# Patient Record
Sex: MALE | Race: WHITE | NOT HISPANIC OR LATINO
[De-identification: names, ages, dates, MRNs, and addresses within clinical notes are randomized per-mention and may not be internally consistent; named-entity substitution may affect disease eponyms.]

---

## 2017-08-11 ENCOUNTER — APPOINTMENT (OUTPATIENT)
Dept: SURGERY | Facility: CLINIC | Age: 57
End: 2017-08-11
Payer: MEDICARE

## 2017-08-11 VITALS
SYSTOLIC BLOOD PRESSURE: 102 MMHG | WEIGHT: 206 LBS | RESPIRATION RATE: 16 BRPM | TEMPERATURE: 97.7 F | HEART RATE: 62 BPM | BODY MASS INDEX: 30.51 KG/M2 | DIASTOLIC BLOOD PRESSURE: 69 MMHG | OXYGEN SATURATION: 99 % | HEIGHT: 69 IN

## 2017-08-11 DIAGNOSIS — Z82.49 FAMILY HISTORY OF ISCHEMIC HEART DISEASE AND OTHER DISEASES OF THE CIRCULATORY SYSTEM: ICD-10-CM

## 2017-08-11 DIAGNOSIS — Z78.9 OTHER SPECIFIED HEALTH STATUS: ICD-10-CM

## 2017-08-11 DIAGNOSIS — Z82.0 FAMILY HISTORY OF EPILEPSY AND OTHER DISEASES OF THE NERVOUS SYSTEM: ICD-10-CM

## 2017-08-11 DIAGNOSIS — Z86.39 PERSONAL HISTORY OF OTHER ENDOCRINE, NUTRITIONAL AND METABOLIC DISEASE: ICD-10-CM

## 2017-08-11 DIAGNOSIS — I25.2 OLD MYOCARDIAL INFARCTION: ICD-10-CM

## 2017-08-11 PROCEDURE — 99204 OFFICE O/P NEW MOD 45 MIN: CPT

## 2017-08-18 ENCOUNTER — APPOINTMENT (OUTPATIENT)
Dept: SURGERY | Facility: CLINIC | Age: 57
End: 2017-08-18

## 2017-12-01 ENCOUNTER — APPOINTMENT (OUTPATIENT)
Dept: ORTHOPEDIC SURGERY | Facility: CLINIC | Age: 57
End: 2017-12-01
Payer: MEDICARE

## 2017-12-01 VITALS
SYSTOLIC BLOOD PRESSURE: 123 MMHG | BODY MASS INDEX: 30.51 KG/M2 | DIASTOLIC BLOOD PRESSURE: 73 MMHG | WEIGHT: 206 LBS | HEART RATE: 69 BPM | HEIGHT: 69 IN

## 2017-12-01 PROCEDURE — 73562 X-RAY EXAM OF KNEE 3: CPT | Mod: RT

## 2017-12-01 PROCEDURE — 99204 OFFICE O/P NEW MOD 45 MIN: CPT | Mod: 25

## 2017-12-01 PROCEDURE — 20610 DRAIN/INJ JOINT/BURSA W/O US: CPT | Mod: RT

## 2018-01-29 ENCOUNTER — RECORD ABSTRACTING (OUTPATIENT)
Age: 58
End: 2018-01-29

## 2018-01-29 DIAGNOSIS — Z87.19 PERSONAL HISTORY OF OTHER DISEASES OF THE DIGESTIVE SYSTEM: ICD-10-CM

## 2018-02-05 ENCOUNTER — FORM ENCOUNTER (OUTPATIENT)
Age: 58
End: 2018-02-05

## 2018-02-06 ENCOUNTER — APPOINTMENT (OUTPATIENT)
Dept: ORTHOPEDIC SURGERY | Facility: HOSPITAL | Age: 58
End: 2018-02-06

## 2018-02-22 ENCOUNTER — MEDICATION RENEWAL (OUTPATIENT)
Age: 58
End: 2018-02-22

## 2018-02-23 ENCOUNTER — APPOINTMENT (OUTPATIENT)
Dept: ORTHOPEDIC SURGERY | Facility: CLINIC | Age: 58
End: 2018-02-23

## 2018-02-23 ENCOUNTER — APPOINTMENT (OUTPATIENT)
Dept: CARDIOLOGY | Facility: CLINIC | Age: 58
End: 2018-02-23

## 2018-03-27 ENCOUNTER — APPOINTMENT (OUTPATIENT)
Dept: ORTHOPEDIC SURGERY | Facility: CLINIC | Age: 58
End: 2018-03-27

## 2018-05-02 ENCOUNTER — APPOINTMENT (OUTPATIENT)
Dept: ORTHOPEDIC SURGERY | Facility: CLINIC | Age: 58
End: 2018-05-02

## 2018-06-06 ENCOUNTER — APPOINTMENT (OUTPATIENT)
Dept: SURGERY | Facility: CLINIC | Age: 58
End: 2018-06-06

## 2018-07-27 ENCOUNTER — APPOINTMENT (OUTPATIENT)
Dept: SURGERY | Facility: CLINIC | Age: 58
End: 2018-07-27

## 2018-08-10 ENCOUNTER — EMERGENCY (EMERGENCY)
Facility: HOSPITAL | Age: 58
LOS: 1 days | Discharge: HOME | End: 2018-08-10
Attending: EMERGENCY MEDICINE
Payer: MEDICARE

## 2018-08-10 VITALS
OXYGEN SATURATION: 98 % | TEMPERATURE: 98 F | RESPIRATION RATE: 16 BRPM | DIASTOLIC BLOOD PRESSURE: 63 MMHG | HEART RATE: 87 BPM | SYSTOLIC BLOOD PRESSURE: 145 MMHG

## 2018-08-10 VITALS — WEIGHT: 250 LBS | HEIGHT: 73 IN

## 2018-08-10 DIAGNOSIS — Z98.890 OTHER SPECIFIED POSTPROCEDURAL STATES: Chronic | ICD-10-CM

## 2018-08-10 LAB
ALBUMIN SERPL ELPH-MCNC: 4.5 G/DL — SIGNIFICANT CHANGE UP (ref 3.3–5.2)
ALP SERPL-CCNC: 65 U/L — SIGNIFICANT CHANGE UP (ref 40–120)
ALT FLD-CCNC: 24 U/L — SIGNIFICANT CHANGE UP
ANION GAP SERPL CALC-SCNC: 11 MMOL/L — SIGNIFICANT CHANGE UP (ref 5–17)
APPEARANCE UR: CLEAR — SIGNIFICANT CHANGE UP
APTT BLD: 28.9 SEC — SIGNIFICANT CHANGE UP (ref 27.5–37.4)
AST SERPL-CCNC: 24 U/L — SIGNIFICANT CHANGE UP
BASOPHILS # BLD AUTO: 0 K/UL — SIGNIFICANT CHANGE UP (ref 0–0.2)
BASOPHILS NFR BLD AUTO: 0.8 % — SIGNIFICANT CHANGE UP (ref 0–2)
BILIRUB SERPL-MCNC: 0.4 MG/DL — SIGNIFICANT CHANGE UP (ref 0.4–2)
BILIRUB UR-MCNC: NEGATIVE — SIGNIFICANT CHANGE UP
BLD GP AB SCN SERPL QL: SIGNIFICANT CHANGE UP
BUN SERPL-MCNC: 16 MG/DL — SIGNIFICANT CHANGE UP (ref 8–20)
CALCIUM SERPL-MCNC: 9.3 MG/DL — SIGNIFICANT CHANGE UP (ref 8.6–10.2)
CHLORIDE SERPL-SCNC: 103 MMOL/L — SIGNIFICANT CHANGE UP (ref 98–107)
CO2 SERPL-SCNC: 28 MMOL/L — SIGNIFICANT CHANGE UP (ref 22–29)
COLOR SPEC: YELLOW — SIGNIFICANT CHANGE UP
CREAT SERPL-MCNC: 0.83 MG/DL — SIGNIFICANT CHANGE UP (ref 0.5–1.3)
DIFF PNL FLD: NEGATIVE — SIGNIFICANT CHANGE UP
EOSINOPHIL # BLD AUTO: 0.2 K/UL — SIGNIFICANT CHANGE UP (ref 0–0.5)
EOSINOPHIL NFR BLD AUTO: 5.1 % — SIGNIFICANT CHANGE UP (ref 0–6)
GLUCOSE SERPL-MCNC: 92 MG/DL — SIGNIFICANT CHANGE UP (ref 70–115)
GLUCOSE UR QL: NEGATIVE MG/DL — SIGNIFICANT CHANGE UP
HCT VFR BLD CALC: 40.5 % — LOW (ref 42–52)
HGB BLD-MCNC: 13.5 G/DL — LOW (ref 14–18)
INR BLD: 0.96 RATIO — SIGNIFICANT CHANGE UP (ref 0.88–1.16)
KETONES UR-MCNC: NEGATIVE — SIGNIFICANT CHANGE UP
LEUKOCYTE ESTERASE UR-ACNC: NEGATIVE — SIGNIFICANT CHANGE UP
LIDOCAIN IGE QN: 45 U/L — SIGNIFICANT CHANGE UP (ref 22–51)
LYMPHOCYTES # BLD AUTO: 1.4 K/UL — SIGNIFICANT CHANGE UP (ref 1–4.8)
LYMPHOCYTES # BLD AUTO: 29.7 % — SIGNIFICANT CHANGE UP (ref 20–55)
MCHC RBC-ENTMCNC: 31 PG — SIGNIFICANT CHANGE UP (ref 27–31)
MCHC RBC-ENTMCNC: 33.3 G/DL — SIGNIFICANT CHANGE UP (ref 32–36)
MCV RBC AUTO: 92.9 FL — SIGNIFICANT CHANGE UP (ref 80–94)
MONOCYTES # BLD AUTO: 0.7 K/UL — SIGNIFICANT CHANGE UP (ref 0–0.8)
MONOCYTES NFR BLD AUTO: 14.1 % — HIGH (ref 3–10)
NEUTROPHILS # BLD AUTO: 2.4 K/UL — SIGNIFICANT CHANGE UP (ref 1.8–8)
NEUTROPHILS NFR BLD AUTO: 50.3 % — SIGNIFICANT CHANGE UP (ref 37–73)
NITRITE UR-MCNC: NEGATIVE — SIGNIFICANT CHANGE UP
PH UR: 6.5 — SIGNIFICANT CHANGE UP (ref 5–8)
PLATELET # BLD AUTO: 235 K/UL — SIGNIFICANT CHANGE UP (ref 150–400)
POTASSIUM SERPL-MCNC: 4.7 MMOL/L — SIGNIFICANT CHANGE UP (ref 3.5–5.3)
POTASSIUM SERPL-SCNC: 4.7 MMOL/L — SIGNIFICANT CHANGE UP (ref 3.5–5.3)
PROT SERPL-MCNC: 6.7 G/DL — SIGNIFICANT CHANGE UP (ref 6.6–8.7)
PROT UR-MCNC: NEGATIVE MG/DL — SIGNIFICANT CHANGE UP
PROTHROM AB SERPL-ACNC: 10.5 SEC — SIGNIFICANT CHANGE UP (ref 9.8–12.7)
RBC # BLD: 4.36 M/UL — LOW (ref 4.6–6.2)
RBC # FLD: 12.7 % — SIGNIFICANT CHANGE UP (ref 11–15.6)
SODIUM SERPL-SCNC: 142 MMOL/L — SIGNIFICANT CHANGE UP (ref 135–145)
SP GR SPEC: 1.01 — SIGNIFICANT CHANGE UP (ref 1.01–1.02)
TYPE + AB SCN PNL BLD: SIGNIFICANT CHANGE UP
UROBILINOGEN FLD QL: 1 MG/DL
WBC # BLD: 4.7 K/UL — LOW (ref 4.8–10.8)
WBC # FLD AUTO: 4.7 K/UL — LOW (ref 4.8–10.8)

## 2018-08-10 PROCEDURE — 85610 PROTHROMBIN TIME: CPT

## 2018-08-10 PROCEDURE — 81003 URINALYSIS AUTO W/O SCOPE: CPT

## 2018-08-10 PROCEDURE — 99284 EMERGENCY DEPT VISIT MOD MDM: CPT

## 2018-08-10 PROCEDURE — 86901 BLOOD TYPING SEROLOGIC RH(D): CPT

## 2018-08-10 PROCEDURE — 80053 COMPREHEN METABOLIC PANEL: CPT

## 2018-08-10 PROCEDURE — 86850 RBC ANTIBODY SCREEN: CPT

## 2018-08-10 PROCEDURE — 96374 THER/PROPH/DIAG INJ IV PUSH: CPT | Mod: XU

## 2018-08-10 PROCEDURE — 74177 CT ABD & PELVIS W/CONTRAST: CPT

## 2018-08-10 PROCEDURE — 99284 EMERGENCY DEPT VISIT MOD MDM: CPT | Mod: 25

## 2018-08-10 PROCEDURE — 87086 URINE CULTURE/COLONY COUNT: CPT

## 2018-08-10 PROCEDURE — 86900 BLOOD TYPING SEROLOGIC ABO: CPT

## 2018-08-10 PROCEDURE — 85730 THROMBOPLASTIN TIME PARTIAL: CPT

## 2018-08-10 PROCEDURE — 85027 COMPLETE CBC AUTOMATED: CPT

## 2018-08-10 PROCEDURE — 83690 ASSAY OF LIPASE: CPT

## 2018-08-10 PROCEDURE — 36415 COLL VENOUS BLD VENIPUNCTURE: CPT

## 2018-08-10 PROCEDURE — 74177 CT ABD & PELVIS W/CONTRAST: CPT | Mod: 26

## 2018-08-10 RX ORDER — KETOROLAC TROMETHAMINE 30 MG/ML
30 SYRINGE (ML) INJECTION ONCE
Qty: 0 | Refills: 0 | Status: DISCONTINUED | OUTPATIENT
Start: 2018-08-10 | End: 2018-08-10

## 2018-08-10 RX ORDER — SODIUM CHLORIDE 9 MG/ML
1000 INJECTION INTRAMUSCULAR; INTRAVENOUS; SUBCUTANEOUS ONCE
Qty: 0 | Refills: 0 | Status: COMPLETED | OUTPATIENT
Start: 2018-08-10 | End: 2018-08-10

## 2018-08-10 RX ADMIN — SODIUM CHLORIDE 1000 MILLILITER(S): 9 INJECTION INTRAMUSCULAR; INTRAVENOUS; SUBCUTANEOUS at 15:29

## 2018-08-10 RX ADMIN — Medication 30 MILLIGRAM(S): at 15:29

## 2018-08-10 NOTE — ED STATDOCS - ATTENDING CONTRIBUTION TO CARE
57 yo Male seen and evaluated with ACP  Presents to ED for abdominal pain  admits to hernia repair x 1 year ago  notes bloating and diarrhea  no fever, chills, nausea, vomiting, chest pain, sob, palpitations, urinary symptoms  vitals reviewed, exam as documented  line, labs, urine, consider additional testing on reassessment

## 2018-08-10 NOTE — ED ADULT NURSE NOTE - CHPI ED NUR SYMPTOMS NEG
no hematuria/no vomiting/no fever/no nausea/no abdominal distension/no burning urination/no diarrhea/no chills/no blood in stool/no dysuria

## 2018-08-10 NOTE — ED ADULT NURSE NOTE - NSIMPLEMENTINTERV_GEN_ALL_ED
Implemented All Universal Safety Interventions:  [place] to call system. Call bell, personal items and telephone within reach. Instruct patient to call for assistance. Room bathroom lighting operational. Non-slip footwear when patient is off stretcher. Physically safe environment: no spills, clutter or unnecessary equipment. Stretcher in lowest position, wheels locked, appropriate side rails in place.

## 2018-08-10 NOTE — ED STATDOCS - OBJECTIVE STATEMENT
57 yo M Pt, PmHx: HTN, CAD, umbilical hernia, PsHx: cardiac stents, umbilical hernia repair c mesh, on Plavix, presents to ED complaining of abdominal pain x 1 yr. PT states he has had generalized abdominal pain since hernia repair 1 yr ago. PT states he became concerned when he developed green stool and bloating over the past 3 days. PT denies blood in stool, constipation, diarrhea, recent travel, urinary symptoms, N/V/D, fever, chills, chest pain, SOB and any other acute symptoms at this time. Social ETOH drinker, denies drugs.

## 2018-08-10 NOTE — ED ADULT NURSE NOTE - TEMPLATE
Sav Patel's Roxanna in admissions denied pt due to her weight. AMARIS called and had a few questions. WILMER called [facility] and left a message for Angela.     WILMER left a message for admissions at [facility].      Roxanna Key, [facility]  [id number]   Abdominal Pain, N/V/D

## 2018-08-10 NOTE — ED STATDOCS - PROGRESS NOTE DETAILS
results discussed with PT. PT given GI MD for follow up. PT tolerated PO. PT verbalized understanding of diagnosis and importance of follow up at PMD. PT educated on importance of follow up and when to return to the ED.

## 2018-08-10 NOTE — ED ADULT NURSE NOTE - OBJECTIVE STATEMENT
pt c/o abd pains, bloating and green stool 3-4 days ago, decreased BM. pains for over a year after hernia repair

## 2018-08-10 NOTE — ED STATDOCS - MEDICAL DECISION MAKING DETAILS
abdominal pain and distention, will obtain CT abd/pel r/o acute abdominal pathology, obtain labs, medicate for pain, and reassess

## 2018-08-11 LAB
CULTURE RESULTS: NO GROWTH — SIGNIFICANT CHANGE UP
SPECIMEN SOURCE: SIGNIFICANT CHANGE UP

## 2018-11-30 ENCOUNTER — APPOINTMENT (OUTPATIENT)
Dept: SURGERY | Facility: CLINIC | Age: 58
End: 2018-11-30
Payer: MEDICARE

## 2018-11-30 VITALS
HEART RATE: 60 BPM | HEIGHT: 69 IN | SYSTOLIC BLOOD PRESSURE: 116 MMHG | WEIGHT: 215 LBS | BODY MASS INDEX: 31.84 KG/M2 | DIASTOLIC BLOOD PRESSURE: 85 MMHG | TEMPERATURE: 98.1 F | OXYGEN SATURATION: 98 %

## 2018-11-30 DIAGNOSIS — K59.00 CONSTIPATION, UNSPECIFIED: ICD-10-CM

## 2018-11-30 DIAGNOSIS — R14.0 ABDOMINAL DISTENSION (GASEOUS): ICD-10-CM

## 2018-11-30 DIAGNOSIS — R10.9 UNSPECIFIED ABDOMINAL PAIN: ICD-10-CM

## 2018-11-30 PROCEDURE — 99214 OFFICE O/P EST MOD 30 MIN: CPT

## 2018-12-05 ENCOUNTER — APPOINTMENT (OUTPATIENT)
Dept: CARDIOLOGY | Facility: CLINIC | Age: 58
End: 2018-12-05
Payer: MEDICARE

## 2018-12-05 VITALS
DIASTOLIC BLOOD PRESSURE: 82 MMHG | HEIGHT: 69 IN | BODY MASS INDEX: 31.84 KG/M2 | RESPIRATION RATE: 14 BRPM | WEIGHT: 215 LBS | SYSTOLIC BLOOD PRESSURE: 120 MMHG | HEART RATE: 61 BPM

## 2018-12-05 PROCEDURE — 93000 ELECTROCARDIOGRAM COMPLETE: CPT

## 2018-12-05 PROCEDURE — 99214 OFFICE O/P EST MOD 30 MIN: CPT

## 2018-12-06 NOTE — ASSESSMENT
[FreeTextEntry1] : 1.  EKG today reveals sinus rhythm at 61 bpm.  Qs in leads II, III, and aVF.  No ischemic changes.  \par 2.  Hyperlipidemia:  Patient in need of fasting bloodwork.  Advised on a low-fat / low-cholesterol diet. \par 3.  Coronary artery disease:  Patient presents today without chest pain or shortness of breath.  In need of echocardiography and a follow up nuclear stress test.  Will schedule both and see patient thereafter. \par

## 2018-12-06 NOTE — HISTORY OF PRESENT ILLNESS
[FreeTextEntry1] : Mr. Charles presents today without complaints of exertional chest pain, shortness of breath, palpitations, lightheadedness, or syncope.  He remains physically active and works out on a regular basis.

## 2018-12-06 NOTE — REASON FOR VISIT
[FreeTextEntry1] : Mr. Charles presents today for the evaluation and management of hyperlipidemia as well as coronary artery disease for which he is status-post multistenting.  \par   \par

## 2019-01-15 ENCOUNTER — APPOINTMENT (OUTPATIENT)
Dept: CARDIOLOGY | Facility: CLINIC | Age: 59
End: 2019-01-15

## 2019-01-18 ENCOUNTER — APPOINTMENT (OUTPATIENT)
Dept: CARDIOLOGY | Facility: CLINIC | Age: 59
End: 2019-01-18

## 2019-03-05 ENCOUNTER — APPOINTMENT (OUTPATIENT)
Dept: CARDIOLOGY | Facility: CLINIC | Age: 59
End: 2019-03-05

## 2019-07-12 ENCOUNTER — RX RENEWAL (OUTPATIENT)
Age: 59
End: 2019-07-12

## 2019-10-29 ENCOUNTER — APPOINTMENT (OUTPATIENT)
Dept: CARDIOLOGY | Facility: CLINIC | Age: 59
End: 2019-10-29

## 2020-01-09 ENCOUNTER — APPOINTMENT (OUTPATIENT)
Dept: CARDIOLOGY | Facility: CLINIC | Age: 60
End: 2020-01-09
Payer: MEDICARE

## 2020-01-09 VITALS
WEIGHT: 211 LBS | BODY MASS INDEX: 31.25 KG/M2 | HEART RATE: 61 BPM | SYSTOLIC BLOOD PRESSURE: 120 MMHG | HEIGHT: 69 IN | DIASTOLIC BLOOD PRESSURE: 80 MMHG | RESPIRATION RATE: 14 BRPM

## 2020-01-09 PROCEDURE — 99214 OFFICE O/P EST MOD 30 MIN: CPT

## 2020-01-09 PROCEDURE — 93000 ELECTROCARDIOGRAM COMPLETE: CPT

## 2020-01-09 RX ORDER — ASPIRIN ENTERIC COATED TABLETS 81 MG 81 MG/1
81 TABLET, DELAYED RELEASE ORAL DAILY
Qty: 90 | Refills: 3 | Status: DISCONTINUED | COMMUNITY
Start: 2018-12-05 | End: 2020-01-09

## 2020-01-10 NOTE — ASSESSMENT
[FreeTextEntry1] : 1.  EKG today reveals normal sinus rhythm at 61 bpm.  Normal intervals.  No evidence of ischemia.  2.  Hyperlipidemia:  Patient has not undergone fasting bloodwork in some time.  I have advised him on a low-fat / low-cholesterol diet and follow up bloodwork in the next few weeks.  3.   Coronary artery disease status-post multistenting:  Patient presents without symptoms but without significant follow up in over a year.  I have recommended echocardiography as well as a nuclear stress test for further evaluation.  I have advised him to continue current medications but to lower his aspirin dose from 325 to 81 mg daily.

## 2020-01-10 NOTE — REASON FOR VISIT
[FreeTextEntry1] : Mr. Charles is a pleasant 59-year-old white male with a past medical history significant for hyperlipidemia as well as coronary artery disease for which he is status-post multistent insertion who presents for follow up evaluation.\par \par

## 2020-01-10 NOTE — HISTORY OF PRESENT ILLNESS
[FreeTextEntry1] :  From a cardiac standpoint, Mr. Charles denies exertional chest pain, shortness of breath, or other cardiac symptoms.  He states he has limited ambulation due to arthritic knees.  He has not followed up with either echocardiography or nuclear stress testing as recommended 13 months ago.

## 2020-01-10 NOTE — PHYSICAL EXAM
[General Appearance - In No Acute Distress] : no acute distress [General Appearance - Well Developed] : well developed [Normal Conjunctiva] : the conjunctiva exhibited no abnormalities [Normal Oral Mucosa] : normal oral mucosa [Auscultation Breath Sounds / Voice Sounds] : lungs were clear to auscultation bilaterally [Heart Sounds] : normal S1 and S2 [Bowel Sounds] : normal bowel sounds [Heart Rate And Rhythm] : heart rate and rhythm were normal [Abnormal Walk] : normal gait [Skin Color & Pigmentation] : normal skin color and pigmentation [Affect] : the affect was normal [Oriented To Time, Place, And Person] : oriented to person, place, and time [Skin Turgor] : normal skin turgor [Mood] : the mood was normal [FreeTextEntry1] : No JVD or bruits

## 2020-02-17 NOTE — ED ADULT NURSE NOTE - NSFALLRSKASSESSTYPE_ED_ALL_ED
PreOp Instructions given:    -- Medication information (what to hold and what to take)   -- NPO guidelines as follows: (or as per your Surgeon)  1. Stop ALL solid food, gum, candy (including vitamins) 8 hours before surgery/procedure time.  2. Stop all CLOUDY liquids: coffee with creamer, cloudy juices, 6 hours prior to surgery/procedure  time.  3. The patient should be ENCOURAGED to drink carbohydrate-rich clear liquids (sports drinks, clear juices) until 2 hours prior to surgery/procedure  time.  4. CLEAR liquids include only water, black coffee NO creamer, clear oral rehydration drinks, clear sports drinks or clear fruit juices (no orange juice, no pulpy juices, no apple cider).   5. IF IN DOUBT, drink water instead.   6. NOTHING TO DRINK 2 hours before to surgery/procedure  time. If you are told to take medication on the morning of surgery, it may be taken with a sip of water.   -- Arrival place and directions given; time to be given the day before procedure by the Surgeon's Office   -- Bathing with antibacterial soap   -- Don't wear any jewelry or bring any valuables AM of surgery   -- No makeup or moisturizer to face   -- No perfume/cologne, powder, lotions or aftershave     Pt verbalized understanding.    Initial (On Arrival)

## 2020-02-26 ENCOUNTER — APPOINTMENT (OUTPATIENT)
Dept: CARDIOLOGY | Facility: CLINIC | Age: 60
End: 2020-02-26
Payer: MEDICARE

## 2020-02-26 PROCEDURE — 93306 TTE W/DOPPLER COMPLETE: CPT

## 2020-03-10 ENCOUNTER — APPOINTMENT (OUTPATIENT)
Dept: CARDIOLOGY | Facility: CLINIC | Age: 60
End: 2020-03-10

## 2020-04-02 ENCOUNTER — APPOINTMENT (OUTPATIENT)
Dept: CARDIOLOGY | Facility: CLINIC | Age: 60
End: 2020-04-02

## 2020-04-07 ENCOUNTER — APPOINTMENT (OUTPATIENT)
Dept: CARDIOLOGY | Facility: CLINIC | Age: 60
End: 2020-04-07

## 2020-07-07 ENCOUNTER — APPOINTMENT (OUTPATIENT)
Dept: CARDIOLOGY | Facility: CLINIC | Age: 60
End: 2020-07-07

## 2020-07-10 ENCOUNTER — APPOINTMENT (OUTPATIENT)
Dept: ORTHOPEDIC SURGERY | Facility: CLINIC | Age: 60
End: 2020-07-10
Payer: MEDICARE

## 2020-07-10 VITALS
BODY MASS INDEX: 30.36 KG/M2 | HEIGHT: 69 IN | SYSTOLIC BLOOD PRESSURE: 109 MMHG | DIASTOLIC BLOOD PRESSURE: 71 MMHG | WEIGHT: 205 LBS | HEART RATE: 80 BPM

## 2020-07-10 DIAGNOSIS — M17.11 UNILATERAL PRIMARY OSTEOARTHRITIS, RIGHT KNEE: ICD-10-CM

## 2020-07-10 DIAGNOSIS — E66.9 OBESITY, UNSPECIFIED: ICD-10-CM

## 2020-07-10 DIAGNOSIS — M71.21 SYNOVIAL CYST OF POPLITEAL SPACE [BAKER], RIGHT KNEE: ICD-10-CM

## 2020-07-10 PROCEDURE — 99214 OFFICE O/P EST MOD 30 MIN: CPT

## 2020-07-10 PROCEDURE — 73562 X-RAY EXAM OF KNEE 3: CPT | Mod: RT

## 2020-07-10 NOTE — HISTORY OF PRESENT ILLNESS
[de-identified] : Patient is a 59 year old male who presents for chronic right knee pain.  patient states is limping due to pain. patient not using medication for pain. patient has large bakers cyst to knee, had drained several times.  Patient unable to work or walk due to pain. Patient states needs TKA.  We had seen him in the past he has known bone-on-bone medial compartment osteoarthritis.  He has had multiple injections with very little relief he has very severe varus deformity.  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  He recognizes and we discussed that that would be difficult for him following total knee arthroplasty..\par He is very optimistic that he will be able to return to work about 4 weeks after surgery but he does understand this can be a significant setback for him especially since that the amount of deformity that he has in his right knee. [Worsening] : worsening [6] : an average pain level of 6/10 [___ yrs] : [unfilled] year(s) ago [4] : a minimum pain level of 4/10 [8] : a maximum pain level of 8/10 [Bending] : worsened by bending [Constant] : ~He/She~ states the symptoms seem to be constant [Walking] : worsened by walking [Direct Pressure] : worsened by direct pressure [Knee Flexion] : worsened with knee flexion [Knee Extension] : worsened with knee extension [Acetaminophen] : not relieved by acetaminophen [NSAIDs] : not relieved by nonsteroidal anti-inflammatory drugs [Exercise Regimen] : not relieved by exercise regimen [Ataxia] : no ataxia [None] : No relieving factors are noted [Incontinence] : no incontinence [Loss of Dexterity] : good dexterity [Urinary Ret.] : no urinary retention

## 2020-07-10 NOTE — PHYSICAL EXAM
[Antalgic] : antalgic [UE/LE] : Motor: 5/5 motor strength in bilateral upper & lower extremities [ALL] : dorsalis pedis, posterior tibial, femoral, popliteal, and radial 2+ and symmetric bilaterally [de-identified] : We did x-ray his right knee again today.  3 views were taken.  He has bone-on-bone medial compartment osteoarthritis with severe varus deformity [de-identified] : GENERAL APPEARANCE: Well nourished and hydrated, pleasant, alert, and oriented x 3. Appears their stated age. \par HEENT: Normocephalic, extraocular eye motion intact. Nasal septum midline. Oral cavity clear. External auditory canal clear. \par RESPIRATORY: Breath sounds clear and audible in all lobes. No wheezing, No accessory muscle use.\par CARDIOVASCULAR: No apparent abnormalities. No lower leg edema. No varicosities. Pedal pulses are palpable.\par NEUROLOGIC: Sensation is normal, no muscle weakness in the upper or lower extremities.\par DERMATOLOGIC: No apparent skin lesions, moist, warm, no rash.\par SPINE: Cervical spine appears normal and moves freely; thoracic spine appears normal and moves freely; lumbosacral spine appears normal and moves freely, normal, nontender.\par MUSCULOSKELETAL: Hands, wrists, and elbows are normal and move freely, shoulders are normal and move freely. \par Musculoskeletal: Gait: limp\par \par Right knee \par Severe varus deformity \par large cyst to right knee \par ROM limited due to pain \par Range of motion 0 to 105 degrees\par

## 2020-07-10 NOTE — DISCUSSION/SUMMARY
[Surgical risks reviewed] : Surgical risks reviewed [de-identified] : The patient presents back he has end-stage medial compartment osteoarthritis.  He has marked varus deformity.  He is a candidate for right total knee arthroplasty.  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  I do think it can be hard for him to kneel on his knee after surgery he understands that.  In addition I did discuss that the Baker's cyst can recur following knee replacement surgery.\par \par The patient is a 59 year old individual with end stage arthritis of their right knee joint. Based upon the patient's continued symptoms and failure to respond to conservative treatment I have recommended a left total knee arthroplasty for this patient. A long discussion took place with the patient describing what a total joint replacement is and what the procedure would entail. A total knee arthroplasty model, similar to the implant that will be used during the operation, was utilized to demonstrate and to discuss the various bearing surfaces of the implants. The hospitalization and post-operative care and rehabilitation were also discussed. The use of perioperative antibiotics and DVT prophylaxis were discussed. The risk, benefits and alternatives to a surgical intervention were discussed at length with the patient.  The patient was also advised of risks related to the medical comorbidities, elevated body mass index (BMI),  and smoking where applicable.  We discussed how to reduce modifiable risk factors and encouraged smoking cessation were applicable. A lengthy discussion took place to review the most common complications including but not limited to: deep vein thrombosis, pulmonary embolus, heart attack, stroke, infection, wound breakdown, numbness, damage to nerves, tendon, muscles, arteries or other blood vessels, death and other possible complications from anesthesia. The patient was told that we will take steps to minimize these risks by using sterile technique, antibiotics and DVT prophylaxis when appropriate and follow the patient postoperatively in the office setting to monitor progress. The possibility of recurrent pain, no improvement in pain and actual worsening of pain were also discussed with the patient.\par The discharge plan of care focused on the patient going home following surgery. The patient was encouraged to make the necessary arrangements to have someone stay with them when they are discharged home. Following discharge, a home care nurse will visit the patient. The home care nurse will open your home care case and request home physical therapy services. Home physical therapy will commence following discharge provided it is appropriate and covered by the health insurance benefit plan. \par The benefits of surgery were discussed with the patient including the potential for improving his/her current clinical condition through operative intervention. Alternatives to surgical intervention including continued conservative management were also discussed in detail. All questions were answered to the satisfaction of the patient. The treatment plan of care, as well as a model of a total knee arthroplasty equivalent to the one that will be used for their total joint replacement, was shared with the patient. The patient agreed to the plan of care as well as the use of implants in their total joint replacement.\par

## 2020-07-14 ENCOUNTER — APPOINTMENT (OUTPATIENT)
Dept: CARDIOLOGY | Facility: CLINIC | Age: 60
End: 2020-07-14

## 2020-07-29 ENCOUNTER — APPOINTMENT (OUTPATIENT)
Dept: CARDIOLOGY | Facility: CLINIC | Age: 60
End: 2020-07-29
Payer: MEDICARE

## 2020-07-29 PROCEDURE — 78452 HT MUSCLE IMAGE SPECT MULT: CPT

## 2020-07-29 PROCEDURE — A9500: CPT

## 2020-07-29 PROCEDURE — 93015 CV STRESS TEST SUPVJ I&R: CPT

## 2020-07-29 RX ADMIN — AMINOPHYLLINE 0 MG/ML: 25 INJECTION, SOLUTION INTRAVENOUS at 00:00

## 2020-07-29 RX ADMIN — REGADENOSON 0 MG/5ML: 0.08 INJECTION, SOLUTION INTRAVENOUS at 00:00

## 2020-07-30 ENCOUNTER — APPOINTMENT (OUTPATIENT)
Dept: CARDIOLOGY | Facility: CLINIC | Age: 60
End: 2020-07-30

## 2020-08-03 ENCOUNTER — TRANSCRIPTION ENCOUNTER (OUTPATIENT)
Age: 60
End: 2020-08-03

## 2020-08-04 ENCOUNTER — OUTPATIENT (OUTPATIENT)
Dept: OUTPATIENT SERVICES | Facility: HOSPITAL | Age: 60
LOS: 1 days | End: 2020-08-04
Payer: MEDICARE

## 2020-08-04 VITALS
SYSTOLIC BLOOD PRESSURE: 109 MMHG | DIASTOLIC BLOOD PRESSURE: 72 MMHG | HEART RATE: 62 BPM | TEMPERATURE: 96 F | RESPIRATION RATE: 16 BRPM | HEIGHT: 69 IN | WEIGHT: 204.37 LBS

## 2020-08-04 DIAGNOSIS — Z98.890 OTHER SPECIFIED POSTPROCEDURAL STATES: Chronic | ICD-10-CM

## 2020-08-04 DIAGNOSIS — Z29.9 ENCOUNTER FOR PROPHYLACTIC MEASURES, UNSPECIFIED: ICD-10-CM

## 2020-08-04 DIAGNOSIS — Z01.818 ENCOUNTER FOR OTHER PREPROCEDURAL EXAMINATION: ICD-10-CM

## 2020-08-04 DIAGNOSIS — M17.11 UNILATERAL PRIMARY OSTEOARTHRITIS, RIGHT KNEE: ICD-10-CM

## 2020-08-04 LAB
A1C WITH ESTIMATED AVERAGE GLUCOSE RESULT: 5.1 % — SIGNIFICANT CHANGE UP (ref 4–5.6)
ALBUMIN SERPL ELPH-MCNC: 4.6 G/DL — SIGNIFICANT CHANGE UP (ref 3.3–5.2)
ALP SERPL-CCNC: 77 U/L — SIGNIFICANT CHANGE UP (ref 40–120)
ALT FLD-CCNC: 16 U/L — SIGNIFICANT CHANGE UP
ANION GAP SERPL CALC-SCNC: 13 MMOL/L — SIGNIFICANT CHANGE UP (ref 5–17)
APTT BLD: 26.1 SEC — LOW (ref 27.5–35.5)
AST SERPL-CCNC: 22 U/L — SIGNIFICANT CHANGE UP
BASOPHILS # BLD AUTO: 0.04 K/UL — SIGNIFICANT CHANGE UP (ref 0–0.2)
BASOPHILS NFR BLD AUTO: 0.7 % — SIGNIFICANT CHANGE UP (ref 0–2)
BILIRUB SERPL-MCNC: 0.4 MG/DL — SIGNIFICANT CHANGE UP (ref 0.4–2)
BLD GP AB SCN SERPL QL: SIGNIFICANT CHANGE UP
BUN SERPL-MCNC: 21 MG/DL — HIGH (ref 8–20)
CALCIUM SERPL-MCNC: 8.9 MG/DL — SIGNIFICANT CHANGE UP (ref 8.6–10.2)
CHLORIDE SERPL-SCNC: 102 MMOL/L — SIGNIFICANT CHANGE UP (ref 98–107)
CO2 SERPL-SCNC: 24 MMOL/L — SIGNIFICANT CHANGE UP (ref 22–29)
CREAT SERPL-MCNC: 0.84 MG/DL — SIGNIFICANT CHANGE UP (ref 0.5–1.3)
EOSINOPHIL # BLD AUTO: 0.17 K/UL — SIGNIFICANT CHANGE UP (ref 0–0.5)
EOSINOPHIL NFR BLD AUTO: 3 % — SIGNIFICANT CHANGE UP (ref 0–6)
ESTIMATED AVERAGE GLUCOSE: 100 MG/DL — SIGNIFICANT CHANGE UP (ref 68–114)
GLUCOSE SERPL-MCNC: 93 MG/DL — SIGNIFICANT CHANGE UP (ref 70–99)
HCT VFR BLD CALC: 35.5 % — LOW (ref 39–50)
HGB BLD-MCNC: 12 G/DL — LOW (ref 13–17)
IMM GRANULOCYTES NFR BLD AUTO: 0.4 % — SIGNIFICANT CHANGE UP (ref 0–1.5)
INR BLD: 1.03 RATIO — SIGNIFICANT CHANGE UP (ref 0.88–1.16)
LYMPHOCYTES # BLD AUTO: 0.96 K/UL — LOW (ref 1–3.3)
LYMPHOCYTES # BLD AUTO: 17 % — SIGNIFICANT CHANGE UP (ref 13–44)
MCHC RBC-ENTMCNC: 32.6 PG — SIGNIFICANT CHANGE UP (ref 27–34)
MCHC RBC-ENTMCNC: 33.8 GM/DL — SIGNIFICANT CHANGE UP (ref 32–36)
MCV RBC AUTO: 96.5 FL — SIGNIFICANT CHANGE UP (ref 80–100)
MONOCYTES # BLD AUTO: 0.54 K/UL — SIGNIFICANT CHANGE UP (ref 0–0.9)
MONOCYTES NFR BLD AUTO: 9.5 % — SIGNIFICANT CHANGE UP (ref 2–14)
MRSA PCR RESULT.: SIGNIFICANT CHANGE UP
NEUTROPHILS # BLD AUTO: 3.93 K/UL — SIGNIFICANT CHANGE UP (ref 1.8–7.4)
NEUTROPHILS NFR BLD AUTO: 69.4 % — SIGNIFICANT CHANGE UP (ref 43–77)
PLATELET # BLD AUTO: 230 K/UL — SIGNIFICANT CHANGE UP (ref 150–400)
POTASSIUM SERPL-MCNC: 4 MMOL/L — SIGNIFICANT CHANGE UP (ref 3.5–5.3)
POTASSIUM SERPL-SCNC: 4 MMOL/L — SIGNIFICANT CHANGE UP (ref 3.5–5.3)
PROT SERPL-MCNC: 6.5 G/DL — LOW (ref 6.6–8.7)
PROTHROM AB SERPL-ACNC: 11.9 SEC — SIGNIFICANT CHANGE UP (ref 10.6–13.6)
RBC # BLD: 3.68 M/UL — LOW (ref 4.2–5.8)
RBC # FLD: 12.3 % — SIGNIFICANT CHANGE UP (ref 10.3–14.5)
S AUREUS DNA NOSE QL NAA+PROBE: SIGNIFICANT CHANGE UP
SODIUM SERPL-SCNC: 138 MMOL/L — SIGNIFICANT CHANGE UP (ref 135–145)
WBC # BLD: 5.66 K/UL — SIGNIFICANT CHANGE UP (ref 3.8–10.5)
WBC # FLD AUTO: 5.66 K/UL — SIGNIFICANT CHANGE UP (ref 3.8–10.5)

## 2020-08-04 PROCEDURE — G0463: CPT

## 2020-08-04 RX ORDER — SODIUM CHLORIDE 9 MG/ML
3 INJECTION INTRAMUSCULAR; INTRAVENOUS; SUBCUTANEOUS EVERY 8 HOURS
Refills: 0 | Status: DISCONTINUED | OUTPATIENT
Start: 2020-08-25 | End: 2020-08-27

## 2020-08-04 NOTE — H&P PST ADULT - NSICDXPROBLEM_GEN_ALL_CORE_FT
PROBLEM DIAGNOSES  Problem: Osteoarthritis of right knee  Assessment and Plan: preop assessment, med and cardiac clearances pending, right TKR 8/25    Problem: Need for prophylactic measure  Assessment and Plan: caprini score 4, moderate risk for dvt SCD ordered, surgical team to assess for dvt prophylaxis

## 2020-08-04 NOTE — H&P PST ADULT - ASSESSMENT
60 yo M     OPIOID RISK TOOL    CINDY EACH BOX THAT APPLIES AND ADD TOTALS AT THE END    FAMILY HISTORY OF SUBSTANCE ABUSE                 FEMALE         MALE                                                Alcohol                             [  ]1 pt          [  ]3pts                                               Illegal Durgs                     [  ]2 pts        [  ]3pts                                               Rx Drugs                           [  ]4 pts        [  ]4 pts    PERSONAL HISTORY OF SUBSTANCE ABUSE                                                                                          Alcohol                             [  ]3 pts       [  ]3 pts                                               Illegal Drugs                     [  ]4 pts        [  ]4 pts                                               Rx Drugs                           [  ]5 pts        [  ]5 pts    AGE BETWEEN 16-45 YEARS                                      [  ]1 pt         [  ]1 pt    HISTORY OF PREADOLESCENT   SEXUAL ABUSE                                                             [  ]3 pts        [  ]0pts    PSYCHOLOGICAL DISEASE                     ADD, OCD, Bipolar, Schizophrenia        [  ]2 pts         [  ]2 pts                      Depression                                               [  ]1 pt           [  ]1 pt           SCORING TOTAL   (add numbers and type here)              ( 0 )                                     A score of 3 or lower indicated LOW risk for future opioid abuse  A score of 4 to 7 indicated moderate risk for future opioid abuse  A score of 8 or higher indicates a high risk for opioid abuse    CAPRINI SCORE [CLOT]    AGE RELATED RISK FACTORS                                                       MOBILITY RELATED FACTORS  [x ] Age 41-60 years                                            (1 Point)                  [ ] Bed rest                                                        (1 Point)  [ ] Age: 61-74 years                                           (2 Points)                 [ ] Plaster cast                                                   (2 Points)  [ ] Age= 75 years                                              (3 Points)                 [ ] Bed bound for more than 72 hours                 (2 Points)    DISEASE RELATED RISK FACTORS                                               GENDER SPECIFIC FACTORS  [ ] Edema in the lower extremities                       (1 Point)                  [ ] Pregnancy                                                     (1 Point)  [ ] Varicose veins                                               (1 Point)                  [ ] Post-partum < 6 weeks                                   (1 Point)             [ x] BMI > 25 Kg/m2                                            (1 Point)                  [ ] Hormonal therapy  or oral contraception          (1 Point)                 [ ] Sepsis (in the previous month)                        (1 Point)                  [ ] History of pregnancy complications                 (1 point)  [ ] Pneumonia or serious lung disease                                               [ ] Unexplained or recurrent                     (1 Point)           (in the previous month)                               (1 Point)  [ ] Abnormal pulmonary function test                     (1 Point)                 SURGERY RELATED RISK FACTORS  [ ] Acute myocardial infarction                              (1 Point)                 [ ]  Section                                             (1 Point)  [ ] Congestive heart failure (in the previous month)  (1 Point)               [ ] Minor surgery                                                  (1 Point)   [ ] Inflammatory bowel disease                             (1 Point)                 [ ] Arthroscopic surgery                                        (2 Points)  [ ] Central venous access                                      (2 Points)                [x] General surgery lasting more than 45 minutes   (2 Points)       [ ] Stroke (in the previous month)                          (5 Points)               [ ] Elective arthroplasty                                         (5 Points)                                                                                                                                               HEMATOLOGY RELATED FACTORS                                                 TRAUMA RELATED RISK FACTORS  [ ] Prior episodes of VTE                                     (3 Points)                [ ] Fracture of the hip, pelvis, or leg                       (5 Points)  [ ] Positive family history for VTE                         (3 Points)                 [ ] Acute spinal cord injury (in the previous month)  (5 Points)  [ ] Prothrombin 11820 A                                     (3 Points)                 [ ] Paralysis  (less than 1 month)                             (5 Points)  [ ] Factor V Leiden                                             (3 Points)                  [ ] Multiple Trauma within 1 month                        (5 Points)  [ ] Lupus anticoagulants                                     (3 Points)                                                           [ ] Anticardiolipin antibodies                               (3 Points)                                                       [ ] High homocysteine in the blood                      (3 Points)                                             [ ] Other congenital or acquired thrombophilia      (3 Points)                                                [ ] Heparin induced thrombocytopenia                  (3 Points)                                          Total Score [   4       ]    Caprini Score 0 - 2:  Low Risk, No VTE Prophylaxis required for most patients, encourage ambulation  Caprini Score 3 - 6:  At Risk, pharmacologic VTE prophylaxis is indicated for most patients (in the absence of a contraindication)  Caprini Score Greater than or = 7:  High Risk, pharmacologic VTE prophylaxis is indicated for most patients (in the absence of a contraindication) 60 yo M Hx of HLD, CAD s/p PCI stents x9, c/o intermittent pain in right knee for the past 4 years. Pain levels include a current pain level of 6/10, an average pain level of 6/10, a minimum pain level of 4/10 and a maximum pain level of 8/10. Pt denies trauma or injury to the right knee. Pt denies using medication for pain. Pain is worsened by bending, direct pressure, walking, knee flexion and extension. Pt has large bakers cyst to knee, drained several times. Pt unable to work or walk due to pain. Pt had multiple injections with very little relief and severe varus deformity. Presents for preop assessment prior to right TKR w/Dr Kiera on        OPIOID RISK TOOL    CINDY EACH BOX THAT APPLIES AND ADD TOTALS AT THE END    FAMILY HISTORY OF SUBSTANCE ABUSE                 FEMALE         MALE                                                Alcohol                             [  ]1 pt          [  ]3pts                                               Illegal Durgs                     [  ]2 pts        [  ]3pts                                               Rx Drugs                           [  ]4 pts        [  ]4 pts    PERSONAL HISTORY OF SUBSTANCE ABUSE                                                                                          Alcohol                             [  ]3 pts       [  ]3 pts                                               Illegal Drugs                     [  ]4 pts        [  ]4 pts                                               Rx Drugs                           [  ]5 pts        [  ]5 pts    AGE BETWEEN 16-45 YEARS                                      [  ]1 pt         [  ]1 pt    HISTORY OF PREADOLESCENT   SEXUAL ABUSE                                                             [  ]3 pts        [  ]0pts    PSYCHOLOGICAL DISEASE                     ADD, OCD, Bipolar, Schizophrenia        [  ]2 pts         [  ]2 pts                      Depression                                               [  ]1 pt           [  ]1 pt           SCORING TOTAL   (add numbers and type here)              ( 0 )                                     A score of 3 or lower indicated LOW risk for future opioid abuse  A score of 4 to 7 indicated moderate risk for future opioid abuse  A score of 8 or higher indicates a high risk for opioid abuse    CAPRINI SCORE [CLOT]    AGE RELATED RISK FACTORS                                                       MOBILITY RELATED FACTORS  [x ] Age 41-60 years                                            (1 Point)                  [ ] Bed rest                                                        (1 Point)  [ ] Age: 61-74 years                                           (2 Points)                 [ ] Plaster cast                                                   (2 Points)  [ ] Age= 75 years                                              (3 Points)                 [ ] Bed bound for more than 72 hours                 (2 Points)    DISEASE RELATED RISK FACTORS                                               GENDER SPECIFIC FACTORS  [ ] Edema in the lower extremities                       (1 Point)                  [ ] Pregnancy                                                     (1 Point)  [ ] Varicose veins                                               (1 Point)                  [ ] Post-partum < 6 weeks                                   (1 Point)             [ x] BMI > 25 Kg/m2                                            (1 Point)                  [ ] Hormonal therapy  or oral contraception          (1 Point)                 [ ] Sepsis (in the previous month)                        (1 Point)                  [ ] History of pregnancy complications                 (1 point)  [ ] Pneumonia or serious lung disease                                               [ ] Unexplained or recurrent                     (1 Point)           (in the previous month)                               (1 Point)  [ ] Abnormal pulmonary function test                     (1 Point)                 SURGERY RELATED RISK FACTORS  [ ] Acute myocardial infarction                              (1 Point)                 [ ]  Section                                             (1 Point)  [ ] Congestive heart failure (in the previous month)  (1 Point)               [ ] Minor surgery                                                  (1 Point)   [ ] Inflammatory bowel disease                             (1 Point)                 [ ] Arthroscopic surgery                                        (2 Points)  [ ] Central venous access                                      (2 Points)                [x] General surgery lasting more than 45 minutes   (2 Points)       [ ] Stroke (in the previous month)                          (5 Points)               [ ] Elective arthroplasty                                         (5 Points)                                                                                                                                               HEMATOLOGY RELATED FACTORS                                                 TRAUMA RELATED RISK FACTORS  [ ] Prior episodes of VTE                                     (3 Points)                [ ] Fracture of the hip, pelvis, or leg                       (5 Points)  [ ] Positive family history for VTE                         (3 Points)                 [ ] Acute spinal cord injury (in the previous month)  (5 Points)  [ ] Prothrombin 23655 A                                     (3 Points)                 [ ] Paralysis  (less than 1 month)                             (5 Points)  [ ] Factor V Leiden                                             (3 Points)                  [ ] Multiple Trauma within 1 month                        (5 Points)  [ ] Lupus anticoagulants                                     (3 Points)                                                           [ ] Anticardiolipin antibodies                               (3 Points)                                                       [ ] High homocysteine in the blood                      (3 Points)                                             [ ] Other congenital or acquired thrombophilia      (3 Points)                                                [ ] Heparin induced thrombocytopenia                  (3 Points)                                          Total Score [   4       ]    Caprini Score 0 - 2:  Low Risk, No VTE Prophylaxis required for most patients, encourage ambulation  Caprini Score 3 - 6:  At Risk, pharmacologic VTE prophylaxis is indicated for most patients (in the absence of a contraindication)  Caprini Score Greater than or = 7:  High Risk, pharmacologic VTE prophylaxis is indicated for most patients (in the absence of a contraindication)

## 2020-08-04 NOTE — H&P PST ADULT - NSANTHOSAYNRD_GEN_A_CORE
No. YELITZA screening performed.  STOP BANG Legend: 0-2 = LOW Risk; 3-4 = INTERMEDIATE Risk; 5-8 = HIGH Risk

## 2020-08-04 NOTE — H&P PST ADULT - NSICDXPASTSURGICALHX_GEN_ALL_CORE_FT
PAST SURGICAL HISTORY:  Fracture, Femur right    H/O hernia repair     History of Back Surgery fusion    s/p Angioplasty with Stent

## 2020-08-04 NOTE — PATIENT PROFILE ADULT - NSPROEDALEARNPREF_GEN_A_NUR
written material/pre-op instructios, surgical wash , MRSA/MSSA & Covid testing reviewed/verbal instruction/individual instruction

## 2020-08-04 NOTE — PATIENT PROFILE ADULT - SURGICAL SITE DESCRIPTION
Subjective   History of Present Illness  History of Present Illness    Chief complaint: Toothache    Location: Left lower jaw    Quality/Severity:  Moderate, sharp    Timing/Onset/Duration: Gradual onset yesterday    Modifying Factors: Cool liquids seems to make it worse    Associated Symptoms: There is no fever or chills.  There is no difficulty opening his mouth.    Narrative: This 25-year-old white male presents with a toothache that started yesterday.  He denies any fever or chills.  He denies any difficulty swallowing or speaking.  He denies any chest pain or shortness of breath.  He denies any difficulty opening his mouth.  He states he does not have a dentist.    PCP:  No Known Provider      Review of Systems   HENT: Positive for dental problem. Negative for trouble swallowing.    Respiratory: Negative for shortness of breath.         Medication List      ASK your doctor about these medications          ibuprofen 200 MG tablet   Commonly known as:  ADVIL,MOTRIN       ondansetron 8 MG tablet   Commonly known as:  ZOFRAN   Take 1 tablet by mouth Every 8 (Eight) Hours As Needed for nausea or   vomiting.       promethazine 25 MG tablet   Commonly known as:  PHENERGAN   Take 1 tablet by mouth Every 6 (Six) Hours As Needed for nausea or   vomiting for up to 12 doses.           Past Medical History   Diagnosis Date   • Asthma        No Known Allergies    Past Surgical History   Procedure Laterality Date   • Tonsillectomy     • Foot surgery     • Dental procedure     • Closed reduction metatarsal fracture         No family history on file.    Social History     Social History   • Marital status: Single     Spouse name: N/A   • Number of children: N/A   • Years of education: N/A     Social History Main Topics   • Smoking status: Never Smoker   • Smokeless tobacco: Not on file   • Alcohol use Yes      Comment: occasional once a month   • Drug use: No   • Sexual activity: Defer     Other Topics Concern   • Not on file      Social History Narrative   • No narrative on file           Objective   Physical Exam   Constitutional: He is oriented to person, place, and time. He appears well-developed and well-nourished. No distress.   ED Triage Vitals:  Temp: 98.9 °F (37.2 °C) (02/12/17 0316)  Heart Rate: 97 (02/12/17 0316)  Resp: 14 (02/12/17 0316)  BP: 145/96 (02/12/17 0316)  SpO2: 98 % (02/12/17 0316)  Temp src: Oral (02/12/17 0316)  Heart Rate Source: Monitor (02/12/17 0316)  Patient Position: Lying (02/12/17 0316)  BP Location: Left arm (02/12/17 0316)  FiO2 (%): n/a    The patient's vitals were reviewed by me.  Unless otherwise noted they are within normal limits.  The patient is hypertensive with a blood pressure 145/96.  He is in a moderate amount of pain.   HENT:   There is tenderness upon palpation of the left lower front molar with some gum swelling.  There is no trismus.  There is no fluctuance.  Patient is maintaining his own airway.  The patient is able to swallow normally.   Neurological: He is alert and oriented to person, place, and time.   Nursing note and vitals reviewed.      Procedures         ED Course  ED Course      3:42 AM, 02/12/17:  The patient's diagnosis of toothache was discussed with him.  The patient will be given a prescription for an antibiotic and pain medication.  Patient will be given a list of dentists that he can follow-up with within the next week.  He should return to emergency department if he has increasing pain, difficulty swallowing or speaking, difficulty opening his mouth, worse in any way at all.    3:48 AM, 02/12/17:  The Eamon report was reviewed by me.  The report #[id number].        [facility]  No orders to display     Labs Reviewed - No data to display  No results found.    Final diagnoses:   None         ED Medications:  Medications - No data to display    New Medications:     Medication List      ASK your doctor about these medications          ibuprofen 200 MG tablet   Commonly known as:   ADVIL,MOTRIN       ondansetron 8 MG tablet   Commonly known as:  ZOFRAN   Take 1 tablet by mouth Every 8 (Eight) Hours As Needed for nausea or   vomiting.       promethazine 25 MG tablet   Commonly known as:  PHENERGAN   Take 1 tablet by mouth Every 6 (Six) Hours As Needed for nausea or   vomiting for up to 12 doses.           Stopped Medications:     Medication List      ASK your doctor about these medications          ibuprofen 200 MG tablet   Commonly known as:  ADVIL,MOTRIN       ondansetron 8 MG tablet   Commonly known as:  ZOFRAN   Take 1 tablet by mouth Every 8 (Eight) Hours As Needed for nausea or   vomiting.       promethazine 25 MG tablet   Commonly known as:  PHENERGAN   Take 1 tablet by mouth Every 6 (Six) Hours As Needed for nausea or   vomiting for up to 12 doses.             Final diagnoses:   Toothache            Jarad Brenner MD  02/12/17 0347       Jarad Brenner MD  02/12/17 0457     right knee ace

## 2020-08-04 NOTE — H&P PST ADULT - NSICDXFAMILYHX_GEN_ALL_CORE_FT
FAMILY HISTORY:  Family history of heart attack, father, dies at 61 yo  FH: CABG (coronary artery bypass surgery), father  FHx: stroke, mother

## 2020-08-04 NOTE — H&P PST ADULT - NSICDXPASTMEDICALHX_GEN_ALL_CORE_FT
PAST MEDICAL HISTORY:  CAD (Coronary Artery Disease)     H/O heart artery stent     Hyperlipidemia     Osteoarthritis of right knee

## 2020-08-07 ENCOUNTER — NON-APPOINTMENT (OUTPATIENT)
Age: 60
End: 2020-08-07

## 2020-08-07 ENCOUNTER — APPOINTMENT (OUTPATIENT)
Dept: CARDIOLOGY | Facility: CLINIC | Age: 60
End: 2020-08-07

## 2020-08-07 ENCOUNTER — APPOINTMENT (OUTPATIENT)
Dept: CARDIOLOGY | Facility: CLINIC | Age: 60
End: 2020-08-07
Payer: MEDICARE

## 2020-08-07 VITALS
BODY MASS INDEX: 30.66 KG/M2 | HEART RATE: 84 BPM | DIASTOLIC BLOOD PRESSURE: 70 MMHG | WEIGHT: 207 LBS | SYSTOLIC BLOOD PRESSURE: 118 MMHG | TEMPERATURE: 97.1 F | RESPIRATION RATE: 14 BRPM | HEIGHT: 69 IN

## 2020-08-07 DIAGNOSIS — Z01.818 ENCOUNTER FOR OTHER PREPROCEDURAL EXAMINATION: ICD-10-CM

## 2020-08-07 DIAGNOSIS — M25.561 PAIN IN RIGHT KNEE: ICD-10-CM

## 2020-08-07 PROBLEM — M17.11 UNILATERAL PRIMARY OSTEOARTHRITIS, RIGHT KNEE: Chronic | Status: ACTIVE | Noted: 2020-08-04

## 2020-08-07 PROCEDURE — 99214 OFFICE O/P EST MOD 30 MIN: CPT

## 2020-08-07 PROCEDURE — 93000 ELECTROCARDIOGRAM COMPLETE: CPT

## 2020-08-07 RX ORDER — AMINOPHYLLINE 25 MG/ML
25 INJECTION, SOLUTION INTRAVENOUS
Qty: 0 | Refills: 0 | Status: COMPLETED | OUTPATIENT
Start: 2020-07-29

## 2020-08-07 RX ORDER — REGADENOSON 0.08 MG/ML
0.4 INJECTION, SOLUTION INTRAVENOUS
Qty: 4 | Refills: 0 | Status: COMPLETED | OUTPATIENT
Start: 2020-07-29

## 2020-08-07 NOTE — PHYSICAL EXAM
[General Appearance - Well Developed] : well developed [General Appearance - In No Acute Distress] : no acute distress [Normal Conjunctiva] : the conjunctiva exhibited no abnormalities [Normal Oral Mucosa] : normal oral mucosa [Auscultation Breath Sounds / Voice Sounds] : lungs were clear to auscultation bilaterally [Heart Rate And Rhythm] : heart rate and rhythm were normal [] : no respiratory distress [Edema] : no peripheral edema present [Heart Sounds] : normal S1 and S2 [Murmurs] : no murmurs present [Abnormal Walk] : normal gait [Bowel Sounds] : normal bowel sounds [Skin Color & Pigmentation] : normal skin color and pigmentation [Skin Turgor] : normal skin turgor [Affect] : the affect was normal [Mood] : the mood was normal [Oriented To Time, Place, And Person] : oriented to person, place, and time [FreeTextEntry1] : No edema

## 2020-08-07 NOTE — DISCUSSION/SUMMARY
[FreeTextEntry1] : Case and plan discussed with Dr. Nuñez.\par \par 1 - Coronary artery disease status-post multistenting:  stable at this time.  Denies exertional chest pain, palpitations, lightheadedness or syncope.  Occasional mild shortness of breath with significant exertion.  Recently underwent echocardiography and pharmacologic nuclear stress testing.\par \par Echocardiogram (2/26/2020):  EF 55-60%.Basal and mid-inferolateral wall is mildly hypokinetic.  Mild mitral annular calcification.  Mild mitral valve regurgitation.  Mild to moderate aortic valve sclerosis/calcification without any evidence of aortic stenosis.  Trace aortic regurgitation, mild tricuspid regurgitation.  In comparison to previous echocardiogram, mitral regurgitation appears less severe.\par \par Pharmacologic nuclear stress test (7/29/2020):  Abnormal Sestamibi myocardial perfusion SPECT imaging.  There is a small to moderate sized, moderate to severe intensity, partially reversible defect of the basal inferior, mid inferior, basal inferolateral and mid inferolateral wall consistent with infarction with perinfarct ischemia. There is mild hypokinesis of the basal inferior and basal inferolateral wall.\par \par Will add metoprolol succinate 25mg for cardio-protection.  Advised to decrease Aspirin from 325mg to 81mg daily.\par \par 2 - Hyperlipidemia:  cholesterol 293, HDL 48, , [id number], TC/HDL 6.1.  Patient advised to follow strict low fat, low cholesterol, low carbohydrate diet.  Needs to restart rosuvastatin 10mg daily as he has not been taking it for approximately 2 years.   May need to titrate accordingly.  Fasting blood work to be repeated in 6-8 weeks.  \par \par 3 - Labs:  Vitamin D 27.5, glucose 94, potassium 4.9, BUN 15, creatinine 0.78, HgA1c 5.3, TSH 1.73, H/H 12.1/35.7, platelets 293.\par \par 4 - At this time there are no absolute cardiac contraindications for Mr. Charles to proceed with his scheduled right knee replacement arthroplasty.  I have advised him to hold  his aspirin, plavix and multivitamin for 7 days prior to the surgery.  He is to restart them as soon as Dr. Qureshi deems it safe.  He may take all other medications up to and including the morning of the surgery with a small sip of water.\par \par 5 - Follow up with Dr. Nuñez in 2 months.\par \par

## 2020-08-07 NOTE — REASON FOR VISIT
[FreeTextEntry1] : The patient is a pleasant 59-year-old whuite male with a past medical history significant for hyperlipidemia as well as coronary artery disease for which he is status-post multistent insertion, who presents for follow up evaluation.

## 2020-08-07 NOTE — HISTORY OF PRESENT ILLNESS
[FreeTextEntry1] : Mr. Charles is here today for cardiac clearance for an upcoming knee surgery with Dr. Qureshi.  Denies exertional chest pain, palpitations, lightheadedness or syncope.  He does experience occasional shortness of breath with significant exertion.  Admits that he does not follow a healthy diet, as he eats out every day.  He has not taken his rosuvstatin 10mg for over 2 years, and he did not reduce his aspirin dose to 81mg from 325mg daily as instructed by Dr. Nuñez at his last visit in January.

## 2020-08-19 DIAGNOSIS — Z01.818 ENCOUNTER FOR OTHER PREPROCEDURAL EXAMINATION: ICD-10-CM

## 2020-08-22 ENCOUNTER — APPOINTMENT (OUTPATIENT)
Dept: DISASTER EMERGENCY | Facility: CLINIC | Age: 60
End: 2020-08-22

## 2020-08-23 LAB — SARS-COV-2 N GENE NPH QL NAA+PROBE: NOT DETECTED

## 2020-08-24 ENCOUNTER — TRANSCRIPTION ENCOUNTER (OUTPATIENT)
Age: 60
End: 2020-08-24

## 2020-08-25 ENCOUNTER — TRANSCRIPTION ENCOUNTER (OUTPATIENT)
Age: 60
End: 2020-08-25

## 2020-08-25 ENCOUNTER — INPATIENT (INPATIENT)
Facility: HOSPITAL | Age: 60
LOS: 1 days | Discharge: HOME HEALTH | DRG: 470 | End: 2020-08-27
Attending: ORTHOPAEDIC SURGERY | Admitting: ORTHOPAEDIC SURGERY
Payer: MEDICARE

## 2020-08-25 ENCOUNTER — APPOINTMENT (OUTPATIENT)
Dept: ORTHOPEDIC SURGERY | Facility: HOSPITAL | Age: 60
End: 2020-08-25

## 2020-08-25 VITALS
OXYGEN SATURATION: 99 % | TEMPERATURE: 97 F | HEART RATE: 50 BPM | WEIGHT: 204.37 LBS | HEIGHT: 69 IN | DIASTOLIC BLOOD PRESSURE: 59 MMHG | SYSTOLIC BLOOD PRESSURE: 90 MMHG | RESPIRATION RATE: 16 BRPM

## 2020-08-25 DIAGNOSIS — I97.89 OTHER POSTPROCEDURAL COMPLICATIONS AND DISORDERS OF THE CIRCULATORY SYSTEM, NOT ELSEWHERE CLASSIFIED: ICD-10-CM

## 2020-08-25 DIAGNOSIS — M17.11 UNILATERAL PRIMARY OSTEOARTHRITIS, RIGHT KNEE: ICD-10-CM

## 2020-08-25 DIAGNOSIS — R11.0 NAUSEA: ICD-10-CM

## 2020-08-25 DIAGNOSIS — E78.5 HYPERLIPIDEMIA, UNSPECIFIED: ICD-10-CM

## 2020-08-25 DIAGNOSIS — I25.10 ATHEROSCLEROTIC HEART DISEASE OF NATIVE CORONARY ARTERY WITHOUT ANGINA PECTORIS: ICD-10-CM

## 2020-08-25 DIAGNOSIS — Z98.890 OTHER SPECIFIED POSTPROCEDURAL STATES: Chronic | ICD-10-CM

## 2020-08-25 DIAGNOSIS — Z96.651 PRESENCE OF RIGHT ARTIFICIAL KNEE JOINT: ICD-10-CM

## 2020-08-25 LAB
ANION GAP SERPL CALC-SCNC: 16 MMOL/L — SIGNIFICANT CHANGE UP (ref 5–17)
BLD GP AB SCN SERPL QL: SIGNIFICANT CHANGE UP
BUN SERPL-MCNC: 15 MG/DL — SIGNIFICANT CHANGE UP (ref 8–20)
CALCIUM SERPL-MCNC: 8.8 MG/DL — SIGNIFICANT CHANGE UP (ref 8.6–10.2)
CHLORIDE SERPL-SCNC: 100 MMOL/L — SIGNIFICANT CHANGE UP (ref 98–107)
CO2 SERPL-SCNC: 21 MMOL/L — LOW (ref 22–29)
CREAT SERPL-MCNC: 0.8 MG/DL — SIGNIFICANT CHANGE UP (ref 0.5–1.3)
GLUCOSE BLDC GLUCOMTR-MCNC: 104 MG/DL — HIGH (ref 70–99)
GLUCOSE BLDC GLUCOMTR-MCNC: 95 MG/DL — SIGNIFICANT CHANGE UP (ref 70–99)
GLUCOSE BLDC GLUCOMTR-MCNC: 99 MG/DL — SIGNIFICANT CHANGE UP (ref 70–99)
GLUCOSE SERPL-MCNC: 135 MG/DL — HIGH (ref 70–99)
HCT VFR BLD CALC: 35.2 % — LOW (ref 39–50)
HGB BLD-MCNC: 11.8 G/DL — LOW (ref 13–17)
MCHC RBC-ENTMCNC: 32.1 PG — SIGNIFICANT CHANGE UP (ref 27–34)
MCHC RBC-ENTMCNC: 33.5 GM/DL — SIGNIFICANT CHANGE UP (ref 32–36)
MCV RBC AUTO: 95.7 FL — SIGNIFICANT CHANGE UP (ref 80–100)
PLATELET # BLD AUTO: 198 K/UL — SIGNIFICANT CHANGE UP (ref 150–400)
POTASSIUM SERPL-MCNC: 4.8 MMOL/L — SIGNIFICANT CHANGE UP (ref 3.5–5.3)
POTASSIUM SERPL-SCNC: 4.8 MMOL/L — SIGNIFICANT CHANGE UP (ref 3.5–5.3)
RBC # BLD: 3.68 M/UL — LOW (ref 4.2–5.8)
RBC # FLD: 12.2 % — SIGNIFICANT CHANGE UP (ref 10.3–14.5)
SODIUM SERPL-SCNC: 136 MMOL/L — SIGNIFICANT CHANGE UP (ref 135–145)
TROPONIN T SERPL-MCNC: <0.01 NG/ML — SIGNIFICANT CHANGE UP (ref 0–0.06)
TROPONIN T SERPL-MCNC: <0.01 NG/ML — SIGNIFICANT CHANGE UP (ref 0–0.06)
WBC # BLD: 9.26 K/UL — SIGNIFICANT CHANGE UP (ref 3.8–10.5)
WBC # FLD AUTO: 9.26 K/UL — SIGNIFICANT CHANGE UP (ref 3.8–10.5)

## 2020-08-25 PROCEDURE — 27447 TOTAL KNEE ARTHROPLASTY: CPT | Mod: RT

## 2020-08-25 PROCEDURE — 27447 TOTAL KNEE ARTHROPLASTY: CPT | Mod: AS,RT

## 2020-08-25 PROCEDURE — 20985 CPTR-ASST DIR MS PX: CPT

## 2020-08-25 PROCEDURE — 73560 X-RAY EXAM OF KNEE 1 OR 2: CPT | Mod: 26,RT

## 2020-08-25 PROCEDURE — 93010 ELECTROCARDIOGRAM REPORT: CPT

## 2020-08-25 PROCEDURE — 99222 1ST HOSP IP/OBS MODERATE 55: CPT

## 2020-08-25 PROCEDURE — 20985 CPTR-ASST DIR MS PX: CPT | Mod: AS

## 2020-08-25 PROCEDURE — 99223 1ST HOSP IP/OBS HIGH 75: CPT

## 2020-08-25 RX ORDER — ACETAMINOPHEN 500 MG
975 TABLET ORAL EVERY 8 HOURS
Refills: 0 | Status: DISCONTINUED | OUTPATIENT
Start: 2020-08-25 | End: 2020-08-27

## 2020-08-25 RX ORDER — TRANEXAMIC ACID 100 MG/ML
1000 INJECTION, SOLUTION INTRAVENOUS ONCE
Refills: 0 | Status: DISCONTINUED | OUTPATIENT
Start: 2020-08-25 | End: 2020-08-25

## 2020-08-25 RX ORDER — OXYCODONE HYDROCHLORIDE 5 MG/1
10 TABLET ORAL
Refills: 0 | Status: DISCONTINUED | OUTPATIENT
Start: 2020-08-25 | End: 2020-08-27

## 2020-08-25 RX ORDER — ACETAMINOPHEN 500 MG
975 TABLET ORAL ONCE
Refills: 0 | Status: COMPLETED | OUTPATIENT
Start: 2020-08-25 | End: 2020-08-25

## 2020-08-25 RX ORDER — SODIUM CHLORIDE 9 MG/ML
1000 INJECTION, SOLUTION INTRAVENOUS
Refills: 0 | Status: DISCONTINUED | OUTPATIENT
Start: 2020-08-25 | End: 2020-08-27

## 2020-08-25 RX ORDER — MAGNESIUM HYDROXIDE 400 MG/1
30 TABLET, CHEWABLE ORAL DAILY
Refills: 0 | Status: DISCONTINUED | OUTPATIENT
Start: 2020-08-25 | End: 2020-08-27

## 2020-08-25 RX ORDER — METOCLOPRAMIDE HCL 10 MG
10 TABLET ORAL ONCE
Refills: 0 | Status: COMPLETED | OUTPATIENT
Start: 2020-08-25 | End: 2020-08-25

## 2020-08-25 RX ORDER — SODIUM CHLORIDE 9 MG/ML
500 INJECTION INTRAMUSCULAR; INTRAVENOUS; SUBCUTANEOUS ONCE
Refills: 0 | Status: COMPLETED | OUTPATIENT
Start: 2020-08-25 | End: 2020-08-25

## 2020-08-25 RX ORDER — ONDANSETRON 8 MG/1
4 TABLET, FILM COATED ORAL EVERY 6 HOURS
Refills: 0 | Status: DISCONTINUED | OUTPATIENT
Start: 2020-08-25 | End: 2020-08-27

## 2020-08-25 RX ORDER — KETOROLAC TROMETHAMINE 30 MG/ML
15 SYRINGE (ML) INJECTION EVERY 6 HOURS
Refills: 0 | Status: DISCONTINUED | OUTPATIENT
Start: 2020-08-25 | End: 2020-08-26

## 2020-08-25 RX ORDER — CEFAZOLIN SODIUM 1 G
2000 VIAL (EA) INJECTION ONCE
Refills: 0 | Status: DISCONTINUED | OUTPATIENT
Start: 2020-08-25 | End: 2020-08-25

## 2020-08-25 RX ORDER — ACETAMINOPHEN 500 MG
1000 TABLET ORAL ONCE
Refills: 0 | Status: COMPLETED | OUTPATIENT
Start: 2020-08-25 | End: 2020-08-25

## 2020-08-25 RX ORDER — HYDROMORPHONE HYDROCHLORIDE 2 MG/ML
0.5 INJECTION INTRAMUSCULAR; INTRAVENOUS; SUBCUTANEOUS
Refills: 0 | Status: DISCONTINUED | OUTPATIENT
Start: 2020-08-25 | End: 2020-08-27

## 2020-08-25 RX ORDER — PROCHLORPERAZINE MALEATE 5 MG
5 TABLET ORAL ONCE
Refills: 0 | Status: COMPLETED | OUTPATIENT
Start: 2020-08-25 | End: 2020-08-25

## 2020-08-25 RX ORDER — ONDANSETRON 8 MG/1
4 TABLET, FILM COATED ORAL ONCE
Refills: 0 | Status: COMPLETED | OUTPATIENT
Start: 2020-08-25 | End: 2020-08-25

## 2020-08-25 RX ORDER — CELECOXIB 200 MG/1
400 CAPSULE ORAL ONCE
Refills: 0 | Status: COMPLETED | OUTPATIENT
Start: 2020-08-25 | End: 2020-08-25

## 2020-08-25 RX ORDER — SENNA PLUS 8.6 MG/1
2 TABLET ORAL AT BEDTIME
Refills: 0 | Status: DISCONTINUED | OUTPATIENT
Start: 2020-08-25 | End: 2020-08-27

## 2020-08-25 RX ORDER — HYDROMORPHONE HYDROCHLORIDE 2 MG/ML
0.5 INJECTION INTRAMUSCULAR; INTRAVENOUS; SUBCUTANEOUS
Refills: 0 | Status: DISCONTINUED | OUTPATIENT
Start: 2020-08-25 | End: 2020-08-25

## 2020-08-25 RX ORDER — POLYETHYLENE GLYCOL 3350 17 G/17G
17 POWDER, FOR SOLUTION ORAL DAILY
Refills: 0 | Status: DISCONTINUED | OUTPATIENT
Start: 2020-08-25 | End: 2020-08-27

## 2020-08-25 RX ORDER — APREPITANT 80 MG/1
40 CAPSULE ORAL ONCE
Refills: 0 | Status: COMPLETED | OUTPATIENT
Start: 2020-08-25 | End: 2020-08-25

## 2020-08-25 RX ORDER — CELECOXIB 200 MG/1
200 CAPSULE ORAL
Refills: 0 | Status: DISCONTINUED | OUTPATIENT
Start: 2020-08-27 | End: 2020-08-27

## 2020-08-25 RX ORDER — DIPHENHYDRAMINE HCL 50 MG
25 CAPSULE ORAL AT BEDTIME
Refills: 0 | Status: DISCONTINUED | OUTPATIENT
Start: 2020-08-25 | End: 2020-08-27

## 2020-08-25 RX ORDER — SODIUM CHLORIDE 9 MG/ML
1000 INJECTION, SOLUTION INTRAVENOUS
Refills: 0 | Status: DISCONTINUED | OUTPATIENT
Start: 2020-08-25 | End: 2020-08-25

## 2020-08-25 RX ORDER — ASPIRIN/CALCIUM CARB/MAGNESIUM 324 MG
325 TABLET ORAL
Refills: 0 | Status: DISCONTINUED | OUTPATIENT
Start: 2020-08-26 | End: 2020-08-26

## 2020-08-25 RX ORDER — PANTOPRAZOLE SODIUM 20 MG/1
40 TABLET, DELAYED RELEASE ORAL
Refills: 0 | Status: DISCONTINUED | OUTPATIENT
Start: 2020-08-26 | End: 2020-08-27

## 2020-08-25 RX ORDER — HYDROMORPHONE HYDROCHLORIDE 2 MG/ML
4 INJECTION INTRAMUSCULAR; INTRAVENOUS; SUBCUTANEOUS
Refills: 0 | Status: DISCONTINUED | OUTPATIENT
Start: 2020-08-25 | End: 2020-08-27

## 2020-08-25 RX ORDER — CLOPIDOGREL BISULFATE 75 MG/1
75 TABLET, FILM COATED ORAL DAILY
Refills: 0 | Status: DISCONTINUED | OUTPATIENT
Start: 2020-08-26 | End: 2020-08-27

## 2020-08-25 RX ORDER — CEFAZOLIN SODIUM 1 G
2000 VIAL (EA) INJECTION
Refills: 0 | Status: COMPLETED | OUTPATIENT
Start: 2020-08-25 | End: 2020-08-25

## 2020-08-25 RX ORDER — OXYCODONE HYDROCHLORIDE 5 MG/1
5 TABLET ORAL
Refills: 0 | Status: DISCONTINUED | OUTPATIENT
Start: 2020-08-25 | End: 2020-08-27

## 2020-08-25 RX ADMIN — SODIUM CHLORIDE 3 MILLILITER(S): 9 INJECTION INTRAMUSCULAR; INTRAVENOUS; SUBCUTANEOUS at 13:42

## 2020-08-25 RX ADMIN — Medication 5 MILLIGRAM(S): at 12:20

## 2020-08-25 RX ADMIN — SODIUM CHLORIDE 1000 MILLILITER(S): 9 INJECTION INTRAMUSCULAR; INTRAVENOUS; SUBCUTANEOUS at 11:17

## 2020-08-25 RX ADMIN — Medication 15 MILLIGRAM(S): at 11:33

## 2020-08-25 RX ADMIN — Medication 975 MILLIGRAM(S): at 22:59

## 2020-08-25 RX ADMIN — HYDROMORPHONE HYDROCHLORIDE 0.5 MILLIGRAM(S): 2 INJECTION INTRAMUSCULAR; INTRAVENOUS; SUBCUTANEOUS at 11:47

## 2020-08-25 RX ADMIN — Medication 15 MILLIGRAM(S): at 18:30

## 2020-08-25 RX ADMIN — Medication 100 MILLIGRAM(S): at 23:00

## 2020-08-25 RX ADMIN — Medication 975 MILLIGRAM(S): at 23:29

## 2020-08-25 RX ADMIN — HYDROMORPHONE HYDROCHLORIDE 0.5 MILLIGRAM(S): 2 INJECTION INTRAMUSCULAR; INTRAVENOUS; SUBCUTANEOUS at 11:27

## 2020-08-25 RX ADMIN — HYDROMORPHONE HYDROCHLORIDE 0.5 MILLIGRAM(S): 2 INJECTION INTRAMUSCULAR; INTRAVENOUS; SUBCUTANEOUS at 12:02

## 2020-08-25 RX ADMIN — Medication 1000 MILLIGRAM(S): at 12:15

## 2020-08-25 RX ADMIN — Medication 15 MILLIGRAM(S): at 23:29

## 2020-08-25 RX ADMIN — Medication 10 MILLIGRAM(S): at 14:21

## 2020-08-25 RX ADMIN — Medication 15 MILLIGRAM(S): at 18:45

## 2020-08-25 RX ADMIN — Medication 15 MILLIGRAM(S): at 23:00

## 2020-08-25 RX ADMIN — CELECOXIB 400 MILLIGRAM(S): 200 CAPSULE ORAL at 07:01

## 2020-08-25 RX ADMIN — APREPITANT 40 MILLIGRAM(S): 80 CAPSULE ORAL at 07:01

## 2020-08-25 RX ADMIN — ONDANSETRON 4 MILLIGRAM(S): 8 TABLET, FILM COATED ORAL at 11:46

## 2020-08-25 RX ADMIN — Medication 15 MILLIGRAM(S): at 11:18

## 2020-08-25 RX ADMIN — HYDROMORPHONE HYDROCHLORIDE 0.5 MILLIGRAM(S): 2 INJECTION INTRAMUSCULAR; INTRAVENOUS; SUBCUTANEOUS at 11:13

## 2020-08-25 RX ADMIN — Medication 400 MILLIGRAM(S): at 11:49

## 2020-08-25 RX ADMIN — SODIUM CHLORIDE 3 MILLILITER(S): 9 INJECTION INTRAMUSCULAR; INTRAVENOUS; SUBCUTANEOUS at 21:25

## 2020-08-25 RX ADMIN — Medication 100 MILLIGRAM(S): at 15:30

## 2020-08-25 RX ADMIN — SODIUM CHLORIDE 75 MILLILITER(S): 9 INJECTION, SOLUTION INTRAVENOUS at 11:53

## 2020-08-25 RX ADMIN — Medication 975 MILLIGRAM(S): at 07:01

## 2020-08-25 NOTE — PROGRESS NOTE ADULT - SUBJECTIVE AND OBJECTIVE BOX
Orthopedic PA Postop Note  Patient S/P RIGHT TKA  Patient in bed comfortable   RIGHT Leg  Dressing C/D/I - ACE wrap without staining  DP Pulse intact   Calf Soft NT  Dorsi/Plantar Flexion/EHL/FHL intact   Sensation intact to light touch    Vital Signs Last 24 Hrs  T(C): 36.4 (25 Aug 2020 19:25), Max: 37.2 (25 Aug 2020 11:06)  T(F): 97.5 (25 Aug 2020 19:25), Max: 98.9 (25 Aug 2020 11:06)  HR: 52 (25 Aug 2020 19:25) (45 - 65)  BP: 105/66 (25 Aug 2020 19:25) (90/59 - 127/79)  BP(mean): --  RR: 18 (25 Aug 2020 19:25) (10 - 18)  SpO2: 99% (25 Aug 2020 19:25) (92% - 100%)    < from: Xray Knee 1 or 2 Views, Right (08.25.20 @ 11:10) >     EXAM:  KNEE-RIGHT                          PROCEDURE DATE:  08/25/2020          INTERPRETATION:  HISTORY: Postoperative  knee replacement.    Two views of the RIGHT knee are submitted.    Evaluation demonstrates the presence of a tricompartmental knee replacement with the femoral, tibial and patellar components in proper anatomic alignment. There is no fracture .  Impression:  Knee prosthetic components in proper anatomical alignment.                TAMELA MCKEON M.D., ATTENDING RADIOLOGIST  This document has been electronically signed. Aug 25 2020 11:54AM        < end of copied text >      A/P:  S/P RIGHT TKA  1. DVTP - ASA  2. Physical Therapy   3. Pain Control as clinically indicated

## 2020-08-25 NOTE — DISCHARGE NOTE PROVIDER - CARE PROVIDER_API CALL
Mateusz Qureshi  ORTHOPAEDIC SURGERY  [address] [facility] [address]  [place] [address]  Phone: [phone]  Fax: [phone]  Follow Up Time: Mateusz Qureshi  ORTHOPAEDIC SURGERY  [address] [facility] [address]  [place] [address]  Phone: [phone]  Fax: [phone]  Follow Up Time:     Tomas Nuñez  CARDIOVASCULAR DISEASE  [address] [place] [address]  Phone: [phone]  Fax: [phone]  Follow Up Time:

## 2020-08-25 NOTE — CONSULT NOTE ADULT - SUBJECTIVE AND OBJECTIVE BOX
SHA GUTIERREZ  [id number]      HPI:  Mr. Gutierrez is a 60 year old man with past medical history of Coronary artery disease (s/p multiple PCIs) and Hyperlipidemia who presented today for elective right total knee arthroplasty. Cardiology consulted due to abnormal EKG, concerning for ST elevations. The patient was seen by his cardiologist earlier this month for evaluation prior to right knee surgery and metoprolol succinate 25 mg daily was added and aspirin was to be decreased to 81 mg daily due to recently abnormal pharmacologic nuclear stress test (infarction with apoorva-infarct ischemia in inferior territories). He was also recommended to hold aspirin and plavix for 7 days prior to surgery.         ALLERGIES:  No Known Allergies      PAST MEDICAL & SURGICAL HISTORY:  Osteoarthritis of right knee  Coronary artery disease (s/p multiple PCIs)  Hyperlipidemia  H/O hernia repair  Fracture, Femur: right  History of Back Surgery: fusion      CURRENT MEDICATIONS:  acetaminophen   Tablet .. 975 milliGRAM(s) Oral every 8 hours  aluminum hydroxide/magnesium hydroxide/simethicone Suspension 30 milliLiter(s) Oral four times a day PRN  ceFAZolin   IVPB 2000 milliGRAM(s) IV Intermittent <User Schedule>  diphenhydrAMINE 25 milliGRAM(s) Oral at bedtime PRN  HYDROmorphone   Tablet 4 milliGRAM(s) Oral every 3 hours PRN  HYDROmorphone  Injectable 0.5 milliGRAM(s) IV Push every 3 hours PRN  ketorolac   Injectable 15 milliGRAM(s) IV Push every 6 hours  lactated ringers. 1000 milliLiter(s) IV Continuous <Continuous>  magnesium hydroxide Suspension 30 milliLiter(s) Oral daily PRN  ondansetron Injectable 4 milliGRAM(s) IV Push every 6 hours PRN  oxyCODONE    IR 5 milliGRAM(s) Oral every 3 hours PRN  oxyCODONE    IR 10 milliGRAM(s) Oral every 3 hours PRN  polyethylene glycol 3350 17 Gram(s) Oral daily  senna 2 Tablet(s) Oral at bedtime PRN  sodium chloride 0.9% lock flush 3 milliLiter(s) IV Push every 8 hours        FAMILY HISTORY:  FHx: stroke: mother  FH: CABG (coronary artery bypass surgery): father  Family history of heart attack: father, dies at 61 yo    HOME CARDIAC MEDS:  Aspirin 81 mg daily  Clopidogrel 75 mg daily  Rosuvastatin 10 mg bedtime  Metoprolol succinate 25 mg daily        ROS:  All 10 systems reviewed and positives noted in HPI    OBJECTIVE:    VITAL SIGNS:  Vital Signs Last 24 Hrs  T(C): 36.3 (25 Aug 2020 15:30), Max: 37.2 (25 Aug 2020 11:06)  T(F): 97.4 (25 Aug 2020 15:30), Max: 98.9 (25 Aug 2020 11:06)  HR: 58 (25 Aug 2020 15:30) (45 - 65)  BP: 112/63 (25 Aug 2020 15:30) (90/59 - 127/79)  BP(mean): --  RR: 18 (25 Aug 2020 15:30) (10 - 18)  SpO2: 98% (25 Aug 2020 15:30) (92% - 100%)    PHYSICAL EXAM:  General: well appearing, no distress  HEENT: sclera anicteric  Neck: supple, no carotid bruits b/l  CVS: JVP ~ 7 cm H20, RRR, s1, s2, no murmurs/rubs/gallops  Chest: unlabored respirations, clear to auscultation b/l  Abdomen: non-distended  Extremities: no lower extremity edema b/l  Neuro: awake, alert & oriented x 3  Psych: normal affect      Outpatient ECG (8/7/2020): sinus rhythm, old inferior infarct, PVC, nonspecific ST/T wave abnormalities    Outpatient nuclear stress test (7/2020):  Small to moderate sized mid-basal inferolateral and basal inferior wall infarct with mild apoorva-infarct ischemia, LVEF 64%    Outpatient TTE (2/2020):  LVEF 55-60%, hypokinesis of the basal and mid inferolateral wall  Normal RV size and systolic function  Mild MR  No pericardial effusion    Cardiac Cath (2011):  LM: 20 % stenosis  Mid LAD: 0% at site of prior stent  Proximal LCx: 0% at site of prior stent  Distal LCx: 100% stenosis  Mid RCA: 0% at site of prior stent SHA GUTIERREZ  [id number]      HPI:  Mr. Gutierrez is a 60 year old man with past medical history of Coronary artery disease (s/p multiple PCIs) and Hyperlipidemia who presented today for elective right total knee arthroplasty. Cardiology consulted due to abnormal EKG, concerning for ST elevations. The patient was seen by his cardiologist earlier this month for evaluation prior to right knee surgery and metoprolol succinate 25 mg daily was added and aspirin was to be decreased to 81 mg daily due to recently abnormal pharmacologic nuclear stress test (infarction with apoorva-infarct ischemia in inferior territories). He was also recommended to hold aspirin and plavix for 7 days prior to surgery.     The patient reports feeling nausea since coming out of surgery. Denies any history of angina, denies angina presently. Reports chronic dyspnea on exertion. Reports that when he received coronary stents it was after having evaluation and not for acute chest pain or shortness of breath. Reports that he has been taking the metoprolol and aspirin.       ALLERGIES:  No Known Allergies      PAST MEDICAL & SURGICAL HISTORY:  Osteoarthritis of right knee  Coronary artery disease (s/p multiple PCIs)  Hyperlipidemia  H/O hernia repair  Fracture, Femur: right  History of Back Surgery: fusion      CURRENT MEDICATIONS:  acetaminophen   Tablet .. 975 milliGRAM(s) Oral every 8 hours  aluminum hydroxide/magnesium hydroxide/simethicone Suspension 30 milliLiter(s) Oral four times a day PRN  ceFAZolin   IVPB 2000 milliGRAM(s) IV Intermittent <User Schedule>  diphenhydrAMINE 25 milliGRAM(s) Oral at bedtime PRN  HYDROmorphone   Tablet 4 milliGRAM(s) Oral every 3 hours PRN  HYDROmorphone  Injectable 0.5 milliGRAM(s) IV Push every 3 hours PRN  ketorolac   Injectable 15 milliGRAM(s) IV Push every 6 hours  lactated ringers. 1000 milliLiter(s) IV Continuous <Continuous>  magnesium hydroxide Suspension 30 milliLiter(s) Oral daily PRN  ondansetron Injectable 4 milliGRAM(s) IV Push every 6 hours PRN  oxyCODONE    IR 5 milliGRAM(s) Oral every 3 hours PRN  oxyCODONE    IR 10 milliGRAM(s) Oral every 3 hours PRN  polyethylene glycol 3350 17 Gram(s) Oral daily  senna 2 Tablet(s) Oral at bedtime PRN  sodium chloride 0.9% lock flush 3 milliLiter(s) IV Push every 8 hours        FAMILY HISTORY:  FHx: stroke: mother  FH: CABG (coronary artery bypass surgery): father  Family history of heart attack: father, dies at 59 yo    HOME CARDIAC MEDS:  Aspirin 81 mg daily  Clopidogrel 75 mg daily  Rosuvastatin 10 mg bedtime  Metoprolol succinate 25 mg daily        ROS:  All 10 systems reviewed and positives noted in HPI    OBJECTIVE:    VITAL SIGNS:  Vital Signs Last 24 Hrs  T(C): 36.3 (25 Aug 2020 15:30), Max: 37.2 (25 Aug 2020 11:06)  T(F): 97.4 (25 Aug 2020 15:30), Max: 98.9 (25 Aug 2020 11:06)  HR: 58 (25 Aug 2020 15:30) (45 - 65)  BP: 112/63 (25 Aug 2020 15:30) (90/59 - 127/79)  BP(mean): --  RR: 18 (25 Aug 2020 15:30) (10 - 18)  SpO2: 98% (25 Aug 2020 15:30) (92% - 100%)    PHYSICAL EXAM:  General: fatigued appearing  HEENT: sclera anicteric  Neck: supple, no carotid bruits b/l  CVS: JVP ~ 7 cm H20, bradycardic, s1, s2, no murmurs/rubs/gallops  Chest: unlabored respirations, clear to auscultation b/l  Abdomen: non-distended  Extremities: no lower extremity edema b/l  Neuro: awake, alert & oriented x 3  Psych: normal affect      Outpatient ECG (8/7/2020): sinus rhythm, old inferior infarct, PVC, nonspecific ST/T wave abnormalities    ECG (8/25/2020): sinus bradycardia at 41 BPM, old inferior infarct, ST elevation V3-V6 which may be early repolarization (different from outpatient ECG)    Outpatient nuclear stress test (7/2020):  Small to moderate sized mid-basal inferolateral and basal inferior wall infarct with mild apoorva-infarct ischemia, LVEF 64%    Outpatient TTE (2/2020):  LVEF 55-60%, hypokinesis of the basal and mid inferolateral wall  Normal RV size and systolic function  Mild MR  No pericardial effusion    Cardiac Cath (2011):  LM: 20 % stenosis  Mid LAD: 0% at site of prior stent  Proximal LCx: 0% at site of prior stent  Distal LCx: 100% stenosis  Mid RCA: 0% at site of prior stent

## 2020-08-25 NOTE — DISCHARGE NOTE PROVIDER - HOSPITAL COURSE
The patient underwent a RIGHT TOTAL KNEE REPLACEMENT on 8/25/20. The patient received antibiotics consistent with SCIP guidelines. The patient underwent the procedure and had no intra-operative complications. Post-operatively, the patient was seen by medicine and PT. The patient received ASA/PLAVIX for clot prevention. The patient received pain medications per orthopedic pain managment pathway and the pain was appropriately controlled. The patient did not have any post-operative medical complications. The patient was discharged in stable condition. The patient underwent a RIGHT TOTAL KNEE REPLACEMENT on 8/25/20. The patient received antibiotics consistent with SCIP guidelines. The patient underwent the procedure and had no intra-operative complications. Post-operatively, the patient was seen by medicine and PT. The patient did have bradycardia while in house. Patient's betablocker was held sand he was monitored. Patient was advised to follow up with Dr Lloyd as out patient within 2 weeks. The patient received ASA/PLAVIX for clot prevention. The patient received pain medications per orthopedic pain managment pathway and the pain was appropriately controlled. The patient did not have any post-operative medical complications. The patient was discharged in stable condition.

## 2020-08-25 NOTE — DISCHARGE NOTE NURSING/CASE MANAGEMENT/SOCIAL WORK - PATIENT PORTAL LINK FT
You can access the FollowMyHealth Patient Portal offered by [facility] by registering at the following website: [email or web address]. By joining [facility]’s FollowMyHealth portal, you will also be able to view your health information using other applications (apps) compatible with our system.

## 2020-08-25 NOTE — CONSULT NOTE ADULT - PROBLEM SELECTOR RECOMMENDATION 4
- HR IN LOW 40's , episode of 37 bpm noted - will hold BB,   cardiac monitor . Patient with persistent nausea post op , lethargic but easy accusable. Will get STAT EKG , TNI

## 2020-08-25 NOTE — PROGRESS NOTE ADULT - SUBJECTIVE AND OBJECTIVE BOX
Asked by cardiology team to evaluate for abnormal ekg and concern for acs.   Patient with symptoms of nausea and bradycardia. s/p anesthesia. Per family similar in previous episode of anesthesia as well.   Repol abnormality seen on EKG. No acute ischemic changes.   Will follow labs and ekg.   No acute intervention.

## 2020-08-25 NOTE — CONSULT NOTE ADULT - ASSESSMENT
Assessment:  Mr. Charles is a 60 year old man with past medical history of Coronary artery disease (s/p multiple PCIs) and Hyperlipidemia who presented today for elective right total knee arthroplasty. Cardiology consulted due to abnormal EKG, concerning for ST elevations.    Recommendations: Assessment:  Mr. Charles is a 60 year old man with past medical history of Coronary artery disease (s/p multiple PCIs) and Hyperlipidemia who presented today for elective right total knee arthroplasty. Cardiology consulted due to abnormal EKG, concerning for ST elevations.    The patient reports feeling "off" and nausea since the surgery a few hours ago. ECG reviewed and appears different from outpatient ECG, appears to have ST elevation in V3-V6 which can be due to early repolarization, but due to symptoms of unwellness and prior significant coronary artery disease history with stents, and prior abnormal nuclear stress test (with per-infarct ischemia in inferior territories in July) and bradycardia, will further discuss with interventional cardiology.    Recommendations:  [] Abnormal ECG/ history of CAD and stents: Check troponins x 3. Check echo. Will discuss patient with interventional cardiology. Due to bradycardia at ~ 2 second pause on telemetry, hold beta blocker. Plan to resume aspirin and plavix once deemed safe by surgery given his history. Continue rosuvastatin 10 mg daily. Continue to monitor on telemetry.    Thank you for the consult. We will follow along.    Guerda Chandler MD  Cardiology Assessment:  Mr. Charles is a 60 year old man with past medical history of Coronary artery disease (s/p multiple PCIs) and Hyperlipidemia who presented today for elective right total knee arthroplasty. Cardiology consulted due to abnormal EKG, concerning for ST elevations.    The patient reports feeling "off" and nausea since the surgery a few hours ago. ECG reviewed and appears different from outpatient ECG, appears to have ST elevation in V3-V6 which can be due to early repolarization, but due to symptoms of unwellness and prior significant coronary artery disease history with stents, and prior abnormal nuclear stress test (with per-infarct ischemia in inferior territories in July) and bradycardia, will further discuss with interventional cardiology.    Recommendations:  [] Abnormal ECG/ history of CAD and stents: Check troponins x 3. Check echo. Will discuss patient with interventional cardiology. Due to bradycardia at ~ 2 second pause on telemetry, hold beta blocker. Plan to resume aspirin and plavix once deemed safe by surgery given his history. Continue rosuvastatin 10 mg daily. Continue to monitor on telemetry.    Update: Discussed with interventional cardiology, and symptoms may be related to vagal etiology, agree with observation as indicated above. No acute intervention was recommended at this time.     Thank you for the consult. We will follow along.    Guerda Chandler MD  Cardiology

## 2020-08-25 NOTE — PROVIDER CONTACT NOTE (MEDICATION) - ASSESSMENT
reports "not feeling well" - nausea despite medication. No specific reports of chest pain or shortness of breath
in no acute distress, just very sleepy. No complaints of chest pain or sob.

## 2020-08-25 NOTE — BRIEF OPERATIVE NOTE - OPERATION/FINDINGS
[facility] knee system 10/ G/ 12 cps/ H/ 38  TTT: 65 min

## 2020-08-25 NOTE — DISCHARGE NOTE PROVIDER - NSDCFUSCHEDAPPT_GEN_ALL_CORE_FT
SHA GUTIERREZ ; 09/16/2020 ; [facility] [address] ; 10/13/2020 ; [facility] [address] [facility]  SHA GUTIERREZ ; 10/23/2020 ; [facility] [address]  SHA GUTIERREZ ; 11/18/2020 ; [facility] [address] [facility] SHA GUTIERREZ ; 09/16/2020 ; [facility] [address] ; 10/13/2020 ; [facility] [address] [facility]  SHA GUTIERREZ ; 10/23/2020 ; [facility] [address]  SHA GUTIERREZ ; 11/18/2020 ; [facility] [address] [facility] SHA GUTIERREZ ; 09/16/2020 ; [facility] [address] ; 10/13/2020 ; [facility] [address] [facility]  SHA GUTIERREZ ; 10/23/2020 ; [facility] [address]  SHA GUTIERREZ ; 11/18/2020 ; [facility] [place] [address] [facility] SHA GUTIERREZ ; 09/16/2020 ; [facility] [address] ; 10/13/2020 ; [facility] [address] [facility]  SHA GUTIERREZ ; 10/23/2020 ; [facility] [address]  SHA GUTIERREZ ; 11/18/2020 ; [facility] [place] [address] [facility] SHA GUTIERREZ ; 09/16/2020 ; [facility] [address] ; 10/13/2020 ; [facility] [address] [facility]  SHA GUTIERREZ ; 10/23/2020 ; [facility] [address]  SHA GUTIERREZ ; 11/18/2020 ; [facility] [address] [facility]

## 2020-08-25 NOTE — DISCHARGE NOTE PROVIDER - CARE PROVIDERS DIRECT ADDRESSES
[email or web address]

## 2020-08-25 NOTE — CONSULT NOTE ADULT - ASSESSMENT
58 yo M Hx of HLD, CAD s/p PCI stents x9,last one 3 yrs ago , hiatal hernia ,  c/o intermittent pain in right knee for the past 4 years.  Pt denies trauma or injury to the right knee. Pt denies using medication for pain. Pain is worsened by bending, direct pressure, walking, knee flexion and extension. Pt has large bakers cyst to knee, drained several times. Pt unable to work or walk due to pain. Pt had multiple injections with very little relief and severe varus deformity. Now patient is s/p R TKA .

## 2020-08-25 NOTE — DISCHARGE NOTE PROVIDER - NSDCMRMEDTOKEN_GEN_ALL_CORE_FT
aspirin 81 mg oral delayed release tablet: 1 tab(s) orally once a day  metoprolol tartrate 25 mg oral tablet: orally once a day  Plavix 75 mg oral tablet: 1 tab(s) orally once a day acetaminophen 325 mg oral tablet: 3 tab(s) orally every 8 hours, As Needed mild pain  aspirin 81 mg oral delayed release tablet: 1 tab(s) orally once a day - RESTART ON 10/7/20 and DISCONTINUE Ecotrin  celecoxib 200 mg oral capsule: 1 cap(s) orally 2 times a day - last dose on 9/15/20 MDD:2  Ecotrin 325 mg oral delayed release tablet: 1 tab(s) orally 2 times a day x 6 weeks postop - last dose on 10/6/20 MDD:2  omeprazole 20 mg oral delayed release capsule: 1 cap(s) orally once a day - last dose on 10/6/20 MDD:1  Plavix 75 mg oral tablet: 1 tab(s) orally once a day  Senna S 50 mg-8.6 mg oral tablet: 2 tab(s) orally once a day (at bedtime), As Needed -for constipation MDD:2 acetaminophen 325 mg oral tablet: 3 tab(s) orally every 8 hours, As Needed mild pain  aspirin 81 mg oral delayed release tablet: 1 tab(s) orally 2 times a day - continue until 10/7 then reduce to once daily  celecoxib 200 mg oral capsule: 1 cap(s) orally 2 times a day - last dose on 9/15/20 MDD:2  omeprazole 20 mg oral delayed release capsule: 1 cap(s) orally once a day - last dose on 10/6/20 MDD:1  Plavix 75 mg oral tablet: 1 tab(s) orally once a day  Senna S 50 mg-8.6 mg oral tablet: 2 tab(s) orally once a day (at bedtime), As Needed -for constipation MDD:2 aspirin 81 mg oral delayed release tablet: 1 tab(s) orally 2 times a day - continue until 10/7 then reduce to once daily  celecoxib 200 mg oral capsule: 1 cap(s) orally 2 times a day - last dose on 9/15/20 MDD:2  omeprazole 20 mg oral delayed release capsule: 1 cap(s) orally once a day - last dose on 10/6/20 MDD:1  oxycodone-acetaminophen 5 mg-325 mg oral tablet: 1-2 tab(s) orally every 4 to 6 hours MDD:8  Plavix 75 mg oral tablet: 1 tab(s) orally once a day  Senna S 50 mg-8.6 mg oral tablet: 2 tab(s) orally once a day (at bedtime), As Needed -for constipation MDD:2

## 2020-08-25 NOTE — DISCHARGE NOTE PROVIDER - NSDCFUADDINST_GEN_ALL_CORE_FT
The patient will be seen in the office between 2-3 weeks for wound check.   **Your first post-operative visit has been scheduled prior to your admission. PLEASE CONTACT OFFICE TO CONFIRM THE APPOINTMENT DATE. Sutures/Staples/Tape will be removed at that time.  **  The silver based dressing is to be removed 7 days from the date of surgery (9/1/20).   ** CONTACT THE OFFICE IF THE FOLLOWING DEVELOP:  - the dressing becomes soiled or saturated  - you develop a fever greater that 101F  - the wound becomes red or you develop blistering around the wound  * Patient may shower after post-op day #3.   * The patient will continue home PT consistent with  total knee replacement protocol. Transition to outpatient PT will occur at the time of the first office visit.   * The patient will practice knee extension exercises regularly to minimize hamstring contraction.   * The patient is FULL weight bearing.  * The patient will continue ASPIRIN for 6 weeks after surgery for blood clot prevention.  *** While on aspirin, the patient will take daily omeprazole or other similar medication to protect the stomach from irritation.   * The patient will take OXYCODONE AND TYLENOL for pain control and adjust according to prescription and patient needs. Contact the office if pain increases while taking prescribed pain medications or related concerns develop.  * Celebrex will be taken twice daily for 3 weeks for pain control and prevention of excessive bone growth. Additional prescription may be requested at your office follow-up visit.   * The patient will take Senna S while taking oxycodone to prevent narcotic associated constipation.  Additionally, increase water intake (drink at least 8 glasses of water daily) and try adding fiber to the diet by eating fruits, vegetables and foods that are rich in grains. If constipation is experienced, contact the medical/primary care provider to discuss further treatment options.  * To avoid injury at home:  - continue use of rolling walker until cleared by physical therapist  - have family or friend remove all throw rug or objects in hallways that may present a trip hazard.  - if you experience any dizziness or medical concerns, call your medical doctor or  911.  * The implant may activate metal detection devices. The patient will be seen in the office between 2-3 weeks for wound check.   **Your first post-operative visit has been scheduled prior to your admission. PLEASE CONTACT OFFICE TO CONFIRM THE APPOINTMENT DATE. Sutures/Staples/Tape will be removed at that time.  **  The silver based dressing is to be removed 7 days from the date of surgery (9/1/20).   ** CONTACT THE OFFICE IF THE FOLLOWING DEVELOP:  - the dressing becomes soiled or saturated  - you develop a fever greater that 101F  - the wound becomes red or you develop blistering around the wound  * Patient may shower after post-op day #3.   * The patient will continue home PT consistent with  total knee replacement protocol. Transition to outpatient PT will occur at the time of the first office visit.   * The patient will practice knee extension exercises regularly to minimize hamstring contraction.   * The patient is FULL weight bearing.  * The patient will continue ASPIRIN 81mg BID and Plavix for 6 weeks after surgery for blood clot prevention.  After 6 weeks resume asa 81mg daily  *** While on aspirin, the patient will take daily omeprazole or other similar medication to protect the stomach from irritation.   * The patient will take OXYCODONE AND TYLENOL for pain control and adjust according to prescription and patient needs. Contact the office if pain increases while taking prescribed pain medications or related concerns develop.  * Celebrex will be taken twice daily for 3 weeks for pain control and prevention of excessive bone growth. Additional prescription may be requested at your office follow-up visit.   * The patient will take Senna S while taking oxycodone to prevent narcotic associated constipation.  Additionally, increase water intake (drink at least 8 glasses of water daily) and try adding fiber to the diet by eating fruits, vegetables and foods that are rich in grains. If constipation is experienced, contact the medical/primary care provider to discuss further treatment options.  * To avoid injury at home:  - continue use of rolling walker until cleared by physical therapist  - have family or friend remove all throw rug or objects in hallways that may present a trip hazard.  - if you experience any dizziness or medical concerns, call your medical doctor or  911.  * The implant may activate metal detection devices. The patient will be seen in the office between 2-3 weeks for wound check.   **Your first post-operative visit has been scheduled prior to your admission. PLEASE CONTACT OFFICE TO CONFIRM THE APPOINTMENT DATE. Tape will be removed at that time.  **  The silver based dressing is to be removed 7 days from the date of surgery (9/1/20).   ** CONTACT THE OFFICE IF THE FOLLOWING DEVELOP:  - the dressing becomes soiled or saturated  - you develop a fever greater that 101F  - the wound becomes red or you develop blistering around the wound  * Patient may shower after post-op day #3.   * The patient will continue home PT consistent with  total knee replacement protocol. Transition to outpatient PT will occur at the time of the first office visit.   * The patient will practice knee extension exercises regularly to minimize hamstring contraction.   * The patient is FULL weight bearing.  * The patient will continue ASPIRIN 81mg BID and Plavix for 6 weeks after surgery for blood clot prevention.  After 6 weeks resume asa 81mg daily  *** While on aspirin, the patient will take daily omeprazole or other similar medication to protect the stomach from irritation.   * The patient will take Percocet for pain control and adjust according to prescription and patient needs. Contact the office if pain increases while taking prescribed pain medications or related concerns develop.  * Celebrex will be taken twice daily for 3 weeks for pain control and prevention of excessive bone growth. Additional prescription may be requested at your office follow-up visit.   * The patient will take Senna S while taking oxycodone to prevent narcotic associated constipation.  Additionally, increase water intake (drink at least 8 glasses of water daily) and try adding fiber to the diet by eating fruits, vegetables and foods that are rich in grains. If constipation is experienced, contact the medical/primary care provider to discuss further treatment options.  * To avoid injury at home:  - continue use of rolling walker until cleared by physical therapist  - have family or friend remove all throw rug or objects in hallways that may present a trip hazard.  - if you experience any dizziness or medical concerns, call your medical doctor or  911.  * The implant may activate metal detection devices. The patient will be seen in the office between 2-3 weeks for wound check.   **Your first post-operative visit has been scheduled prior to your admission. PLEASE CONTACT OFFICE TO CONFIRM THE APPOINTMENT DATE. Tape will be removed at that time.  **  The silver based dressing is to be removed 7 days from the date of surgery (9/1/20).   ** CONTACT THE OFFICE IF THE FOLLOWING DEVELOP:  - the dressing becomes soiled or saturated  - you develop a fever greater that 101F  - the wound becomes red or you develop blistering around the wound  * Patient may shower after post-op day #3.   * The patient will continue home PT consistent with  total knee replacement protocol. Transition to outpatient PT will occur at the time of the first office visit.   * The patient will practice knee extension exercises regularly to minimize hamstring contraction.   * The patient is FULL weight bearing.  * The patient will continue ASPIRIN 81mg BID and Plavix for 6 weeks after surgery for blood clot prevention.  After 6 weeks resume asa 81mg daily  *** While on aspirin, the patient will take daily omeprazole or other similar medication to protect the stomach from irritation.   ** Patient is to follow up with his cardiologist Dr Nuñez regardig the bradycardia and is to continue to hold the betablocker metoprolol as per medical team .   * The patient will take Percocet for pain control and adjust according to prescription and patient needs. Contact the office if pain increases while taking prescribed pain medications or related concerns develop.  * Celebrex will be taken twice daily for 3 weeks for pain control and prevention of excessive bone growth. Additional prescription may be requested at your office follow-up visit.   * The patient will take Senna S while taking oxycodone to prevent narcotic associated constipation.  Additionally, increase water intake (drink at least 8 glasses of water daily) and try adding fiber to the diet by eating fruits, vegetables and foods that are rich in grains. If constipation is experienced, contact the medical/primary care provider to discuss further treatment options.  * To avoid injury at home:  - continue use of rolling walker until cleared by physical therapist  - have family or friend remove all throw rug or objects in hallways that may present a trip hazard.  - if you experience any dizziness or medical concerns, call your medical doctor or  911.  * The implant may activate metal detection devices.

## 2020-08-25 NOTE — CONSULT NOTE ADULT - SUBJECTIVE AND OBJECTIVE BOX
PMD : Vamsi   Cardio : Everton Balbuena     Patient is a 60y old  Male who is s/p R TKA , POD #0 , seen and examined on med/ surge floor .   C/O persistent nausea post op despite medications , still lethargic . Open his eyes , answer questions and goes to sleep right way .   Denies sob , cp , denies other complaints .     CC: R knee chronic pain , post op with c/o nausea , lethargic , noted to be bradycardic       HPI:  60 yo M Hx of HLD, CAD s/p PCI stents x9,last one 3 yrs ago , Hx hiatal hernia ,  c/o intermittent pain in right knee for the past 4 years.  Pt denies trauma or injury to the right knee. Pt denies using medication for pain. Pain is worsened by bending, direct pressure, walking, knee flexion and extension. Pt has large bakers cyst to knee, drained several times. Pt unable to work or walk due to pain. Pt had multiple injections with very little relief and severe varus deformity. Now patient is s/p R TKA .       PAST MEDICAL & SURGICAL HISTORY:  Osteoarthritis of right knee  H/O heart artery stent  Hyperlipidemia  CAD (Coronary Artery Disease)  H/O hernia repair  Fracture, Femur: right  History of Back Surgery: fusion  s/p Angioplasty with Stent      Social History:  Tobacco - denies   ETOH - denies  Illicit drug abuse - denies    FAMILY HISTORY:  FHx: stroke: mother  FH: CABG (coronary artery bypass surgery): father  Family history of heart attack: father, dies at 61 yo      Allergies    No Known Allergies    Intolerances        HOME MEDICATIONS :   · 	Plavix 75 mg oral tablet: Last Dose Taken:  , 1 tab(s) orally once a day  · 	aspirin 81 mg oral delayed release tablet: Last Dose Taken:  , 1 tab(s) orally once a day  .           Rosuvastatin 10 mg qhs  .           Metoprolol ER 25 mg Daily   REVIEW OF SYSTEMS:        MEDICATIONS  (STANDING):  acetaminophen   Tablet .. 975 milliGRAM(s) Oral every 8 hours  ceFAZolin   IVPB 2000 milliGRAM(s) IV Intermittent <User Schedule>  ketorolac   Injectable 15 milliGRAM(s) IV Push every 6 hours  lactated ringers. 1000 milliLiter(s) (150 mL/Hr) IV Continuous <Continuous>  metoclopramide Injectable 10 milliGRAM(s) IV Push once  polyethylene glycol 3350 17 Gram(s) Oral daily  sodium chloride 0.9% lock flush 3 milliLiter(s) IV Push every 8 hours    MEDICATIONS  (PRN):  aluminum hydroxide/magnesium hydroxide/simethicone Suspension 30 milliLiter(s) Oral four times a day PRN Indigestion  diphenhydrAMINE 25 milliGRAM(s) Oral at bedtime PRN Insomnia  HYDROmorphone   Tablet 4 milliGRAM(s) Oral every 3 hours PRN Severe Pain (7 - 10)  HYDROmorphone  Injectable 0.5 milliGRAM(s) IV Push every 3 hours PRN Severe/ Breakthrough Pain (7 - 10)  magnesium hydroxide Suspension 30 milliLiter(s) Oral daily PRN Constipation  ondansetron Injectable 4 milliGRAM(s) IV Push every 6 hours PRN Nausea and/or Vomiting  oxyCODONE    IR 5 milliGRAM(s) Oral every 3 hours PRN Mild Pain (1 - 3)  oxyCODONE    IR 10 milliGRAM(s) Oral every 3 hours PRN Moderate Pain (4 - 6)  senna 2 Tablet(s) Oral at bedtime PRN Constipation      Vital Signs Last 24 Hrs  T(C): 36.6 (25 Aug 2020 13:00), Max: 37.2 (25 Aug 2020 11:06)  T(F): 97.8 (25 Aug 2020 13:00), Max: 98.9 (25 Aug 2020 11:06)  HR: 50 (25 Aug 2020 13:00) (45 - 65)  BP: 127/79 (25 Aug 2020 13:00) (90/59 - 127/79)  BP(mean): --  RR: 13 (25 Aug 2020 13:00) (10 - 17)  SpO2: 98% (25 Aug 2020 13:00) (96% - 100%)    PHYSICAL EXAM:    GENERAL: NAD, well-groomed, well-developed  HEAD:  Atraumatic, Normocephalic  EYES: EOMI, PERRLA, conjunctiva and sclera clear  NECK: Supple, No JVD, Normal thyroid  NERVOUS SYSTEM:  Alert & Oriented X3, Good concentration , no focal deficit   CHEST/LUNG: CTA  b/l,  no rales, rhonchi, wheezing, or rubs  HEART: Regular rate and rhythm; No murmurs, rubs, or gallops  ABDOMEN: Soft, Nontender, Nondistended; Bowel sounds present  EXTREMITIES:  2+ Peripheral Pulses, No clubbing, cyanosis, or edema ,   LYMPH: No lymphadenopathy noted  SKIN: No rashes or lesions    LABS: Pending     RADIOLOGY & ADDITIONAL STUDIES:  < from: Xray Knee 1 or 2 Views, Right (08.25.20 @ 11:10) >     EXAM:  KNEE-RIGHT                          PROCEDURE DATE:  08/25/2020          INTERPRETATION:  HISTORY: Postoperative  knee replacement.    Two views of the RIGHT knee are submitted.    Evaluation demonstrates the presence of a tricompartmental knee replacement with the femoral, tibial and patellar components in proper anatomic alignment. There is no fracture .  Impression:  Knee prosthetic components in proper anatomical alignment.    TAMELA MCKEON M.D., ATTENDING RADIOLOGIST  This document has been electronically signed. Aug 25 2020 11:54AM     < end of copied text >

## 2020-08-26 LAB
ANION GAP SERPL CALC-SCNC: 11 MMOL/L — SIGNIFICANT CHANGE UP (ref 5–17)
BUN SERPL-MCNC: 14 MG/DL — SIGNIFICANT CHANGE UP (ref 8–20)
CALCIUM SERPL-MCNC: 8.8 MG/DL — SIGNIFICANT CHANGE UP (ref 8.6–10.2)
CHLORIDE SERPL-SCNC: 102 MMOL/L — SIGNIFICANT CHANGE UP (ref 98–107)
CO2 SERPL-SCNC: 25 MMOL/L — SIGNIFICANT CHANGE UP (ref 22–29)
CREAT SERPL-MCNC: 0.83 MG/DL — SIGNIFICANT CHANGE UP (ref 0.5–1.3)
GLUCOSE SERPL-MCNC: 102 MG/DL — HIGH (ref 70–99)
HCT VFR BLD CALC: 32.5 % — LOW (ref 39–50)
HGB BLD-MCNC: 10.9 G/DL — LOW (ref 13–17)
MCHC RBC-ENTMCNC: 32.1 PG — SIGNIFICANT CHANGE UP (ref 27–34)
MCHC RBC-ENTMCNC: 33.5 GM/DL — SIGNIFICANT CHANGE UP (ref 32–36)
MCV RBC AUTO: 95.6 FL — SIGNIFICANT CHANGE UP (ref 80–100)
PLATELET # BLD AUTO: 212 K/UL — SIGNIFICANT CHANGE UP (ref 150–400)
POTASSIUM SERPL-MCNC: 4.1 MMOL/L — SIGNIFICANT CHANGE UP (ref 3.5–5.3)
POTASSIUM SERPL-SCNC: 4.1 MMOL/L — SIGNIFICANT CHANGE UP (ref 3.5–5.3)
RBC # BLD: 3.4 M/UL — LOW (ref 4.2–5.8)
RBC # FLD: 12.4 % — SIGNIFICANT CHANGE UP (ref 10.3–14.5)
SODIUM SERPL-SCNC: 138 MMOL/L — SIGNIFICANT CHANGE UP (ref 135–145)
TROPONIN T SERPL-MCNC: <0.01 NG/ML — SIGNIFICANT CHANGE UP (ref 0–0.06)
WBC # BLD: 12.12 K/UL — HIGH (ref 3.8–10.5)
WBC # FLD AUTO: 12.12 K/UL — HIGH (ref 3.8–10.5)

## 2020-08-26 PROCEDURE — 99233 SBSQ HOSP IP/OBS HIGH 50: CPT

## 2020-08-26 PROCEDURE — 99232 SBSQ HOSP IP/OBS MODERATE 35: CPT

## 2020-08-26 RX ORDER — ASPIRIN/CALCIUM CARB/MAGNESIUM 324 MG
1 TABLET ORAL
Qty: 0 | Refills: 0 | DISCHARGE

## 2020-08-26 RX ORDER — ASPIRIN/CALCIUM CARB/MAGNESIUM 324 MG
1 TABLET ORAL
Qty: 0 | Refills: 0 | DISCHARGE
Start: 2020-08-26 | End: 2020-10-07

## 2020-08-26 RX ORDER — ASPIRIN/CALCIUM CARB/MAGNESIUM 324 MG
81 TABLET ORAL
Refills: 0 | Status: DISCONTINUED | OUTPATIENT
Start: 2020-08-26 | End: 2020-08-27

## 2020-08-26 RX ORDER — SENNOSIDES/DOCUSATE SODIUM 8.6MG-50MG
2 TABLET ORAL
Qty: 20 | Refills: 0
Start: 2020-08-26

## 2020-08-26 RX ORDER — METOPROLOL TARTRATE 50 MG
0 TABLET ORAL
Qty: 0 | Refills: 0 | DISCHARGE

## 2020-08-26 RX ORDER — OMEPRAZOLE 10 MG/1
1 CAPSULE, DELAYED RELEASE ORAL
Qty: 42 | Refills: 0
Start: 2020-08-26 | End: 2020-10-06

## 2020-08-26 RX ORDER — CELECOXIB 200 MG/1
1 CAPSULE ORAL
Qty: 42 | Refills: 0
Start: 2020-08-26 | End: 2020-09-15

## 2020-08-26 RX ORDER — ACETAMINOPHEN 500 MG
3 TABLET ORAL
Qty: 0 | Refills: 0 | DISCHARGE
Start: 2020-08-26

## 2020-08-26 RX ORDER — ASPIRIN/CALCIUM CARB/MAGNESIUM 324 MG
1 TABLET ORAL
Qty: 84 | Refills: 0
Start: 2020-08-26 | End: 2020-10-06

## 2020-08-26 RX ADMIN — OXYCODONE HYDROCHLORIDE 10 MILLIGRAM(S): 5 TABLET ORAL at 17:30

## 2020-08-26 RX ADMIN — Medication 325 MILLIGRAM(S): at 05:41

## 2020-08-26 RX ADMIN — SODIUM CHLORIDE 3 MILLILITER(S): 9 INJECTION INTRAMUSCULAR; INTRAVENOUS; SUBCUTANEOUS at 05:33

## 2020-08-26 RX ADMIN — Medication 975 MILLIGRAM(S): at 06:12

## 2020-08-26 RX ADMIN — Medication 975 MILLIGRAM(S): at 13:13

## 2020-08-26 RX ADMIN — HYDROMORPHONE HYDROCHLORIDE 0.5 MILLIGRAM(S): 2 INJECTION INTRAMUSCULAR; INTRAVENOUS; SUBCUTANEOUS at 14:31

## 2020-08-26 RX ADMIN — OXYCODONE HYDROCHLORIDE 10 MILLIGRAM(S): 5 TABLET ORAL at 22:42

## 2020-08-26 RX ADMIN — Medication 975 MILLIGRAM(S): at 05:42

## 2020-08-26 RX ADMIN — Medication 975 MILLIGRAM(S): at 22:42

## 2020-08-26 RX ADMIN — HYDROMORPHONE HYDROCHLORIDE 4 MILLIGRAM(S): 2 INJECTION INTRAMUSCULAR; INTRAVENOUS; SUBCUTANEOUS at 14:10

## 2020-08-26 RX ADMIN — Medication 975 MILLIGRAM(S): at 22:12

## 2020-08-26 RX ADMIN — OXYCODONE HYDROCHLORIDE 10 MILLIGRAM(S): 5 TABLET ORAL at 18:23

## 2020-08-26 RX ADMIN — PANTOPRAZOLE SODIUM 40 MILLIGRAM(S): 20 TABLET, DELAYED RELEASE ORAL at 05:41

## 2020-08-26 RX ADMIN — CLOPIDOGREL BISULFATE 75 MILLIGRAM(S): 75 TABLET, FILM COATED ORAL at 13:14

## 2020-08-26 RX ADMIN — HYDROMORPHONE HYDROCHLORIDE 4 MILLIGRAM(S): 2 INJECTION INTRAMUSCULAR; INTRAVENOUS; SUBCUTANEOUS at 13:14

## 2020-08-26 RX ADMIN — HYDROMORPHONE HYDROCHLORIDE 0.5 MILLIGRAM(S): 2 INJECTION INTRAMUSCULAR; INTRAVENOUS; SUBCUTANEOUS at 18:41

## 2020-08-26 RX ADMIN — Medication 975 MILLIGRAM(S): at 14:10

## 2020-08-26 RX ADMIN — OXYCODONE HYDROCHLORIDE 10 MILLIGRAM(S): 5 TABLET ORAL at 22:12

## 2020-08-26 RX ADMIN — SODIUM CHLORIDE 3 MILLILITER(S): 9 INJECTION INTRAMUSCULAR; INTRAVENOUS; SUBCUTANEOUS at 21:39

## 2020-08-26 RX ADMIN — Medication 15 MILLIGRAM(S): at 06:12

## 2020-08-26 RX ADMIN — HYDROMORPHONE HYDROCHLORIDE 0.5 MILLIGRAM(S): 2 INJECTION INTRAMUSCULAR; INTRAVENOUS; SUBCUTANEOUS at 15:00

## 2020-08-26 RX ADMIN — Medication 81 MILLIGRAM(S): at 17:31

## 2020-08-26 RX ADMIN — SODIUM CHLORIDE 3 MILLILITER(S): 9 INJECTION INTRAMUSCULAR; INTRAVENOUS; SUBCUTANEOUS at 13:15

## 2020-08-26 RX ADMIN — HYDROMORPHONE HYDROCHLORIDE 0.5 MILLIGRAM(S): 2 INJECTION INTRAMUSCULAR; INTRAVENOUS; SUBCUTANEOUS at 19:11

## 2020-08-26 RX ADMIN — Medication 15 MILLIGRAM(S): at 05:42

## 2020-08-26 NOTE — PROGRESS NOTE ADULT - ASSESSMENT
60 yo M Hx of HLD, CAD s/p PCI stents x9,last one 3 yrs ago , hiatal hernia ,  c/o intermittent pain in right knee for the past 4 years.  Pt denies trauma or injury to the right knee. Pt denies using medication for pain. Pain is worsened by bending, direct pressure, walking, knee flexion and extension. Pt has large bakers cyst to knee, drained several times. Pt unable to work or walk due to pain. Pt had multiple injections with very little relief and severe varus deformity. Now patient is s/p R TKA  , POD #1 . Post op with prolong nausea / vomiting , EKG done  concerning for ST elevations and pauses seen on tele with bradycardia . Cardiology called - appreciated , seen by Interventional cardiologist also .      Problem/Recommendation - 1:  Problem: Primary osteoarthritis of right knee. Recommendation: S/P R TKA.     Problem/Recommendation - 2:  ·  Problem: Status post right knee replacement.  Recommendation: PT/OT/pain mgmt  DVT prophylaxis- as per ortho  Abx as per SCIP- given   Incentive spirometry  Prophylaxis of opioid  induced constipation.      Problem/Recommendation - 3:  ·  Problem: Coronary artery disease involving native coronary artery of native heart without angina pectoris.  Recommendation: - continue ASA / statins , BB on hold due to postop bradycardia. Will d/w cardiology for opinion on further recommendations on BB continuation .       Problem/Recommendation - 4:  ·  Problem: Postoperative bradycardia.  Recommendation: likely due to postoperative vagal etiology . HR in 50's now , while asleep in low 40's .   Awaiting for cardiology recommendation on continuation of BB .      Problem/Recommendation - 5:  ·  Problem: Postoperative nausea.  Recommendation: - as above , continue Zofran , avoid Reglan / Compazine 2/2 bradycardia.      Problem/Recommendation - 6:  Problem: Need for prophylactic measure. Recommendation: DVT prophylaxis  - as per ortho protocol- on ASA BID   Opioid induced constipation  prophylaxis - bowel regimen.     Problem/Recommendation - 7:  Problem: Hyperlipidemia. Recommendation: continue statins    Problem / Plan - 8: Acute blood lost anemia - secondary to surgical blood lost -  asymptomatic.    Problem / Plan - 9: Leucocytosis - no S/S of infection , likely reactive .   Patient wishes to stay one more day , will observe him x 24 hrs ,   Dispo plan - most likely in am .   D/W patient / ortho PA , awaiting call back from cardiologist .

## 2020-08-26 NOTE — OCCUPATIONAL THERAPY INITIAL EVALUATION ADULT - MANUAL MUSCLE TESTING RESULTS, REHAB EVAL
bilateral shoulder flexion grossly assessed with AROM against gravity 3/5, bilateral elbow flexion/extension grossly assessed with AROM against gravity 3/5, bilateral gross grasp 5/5

## 2020-08-26 NOTE — OCCUPATIONAL THERAPY INITIAL EVALUATION ADULT - ADDITIONAL COMMENTS
Pt lives in apartment attached to sister's house with 1 OMER and no steps inside. Bathroom has shower stall with door. Pt owns RW, cane, commode. Pt is left handed. Pt drives. Sister is available to assist upon discharge if needed.

## 2020-08-26 NOTE — PHYSICAL THERAPY INITIAL EVALUATION ADULT - ACTIVE RANGE OF MOTION EXAMINATION, REHAB EVAL
bilateral  lower extremity Active ROM was WFL (within functional limits)/deficits as listed below/bilateral upper extremity Active ROM was WFL (within functional limits)/right knee -5 to approx 80 / 2/5 grossly throught right hip and knee due to pain

## 2020-08-26 NOTE — PROGRESS NOTE ADULT - SUBJECTIVE AND OBJECTIVE BOX
Patient seen and examined . S/p  R TKA  , POD # 1. Pain well controlled , nausea and multiple episodes of vomiting post op till 7:30 pm last night  now resolved . No n/v , voiding without difficulties , participating with physical therapy .     CC : R knee pain post op well controlled , v/v resolved       MEDICATIONS  (STANDING):  acetaminophen   Tablet .. 975 milliGRAM(s) Oral every 8 hours  aspirin enteric coated 325 milliGRAM(s) Oral two times a day  clopidogrel Tablet 75 milliGRAM(s) Oral daily  lactated ringers. 1000 milliLiter(s) (150 mL/Hr) IV Continuous <Continuous>  pantoprazole    Tablet 40 milliGRAM(s) Oral before breakfast  polyethylene glycol 3350 17 Gram(s) Oral daily  sodium chloride 0.9% lock flush 3 milliLiter(s) IV Push every 8 hours    MEDICATIONS  (PRN):  aluminum hydroxide/magnesium hydroxide/simethicone Suspension 30 milliLiter(s) Oral four times a day PRN Indigestion  diphenhydrAMINE 25 milliGRAM(s) Oral at bedtime PRN Insomnia  HYDROmorphone   Tablet 4 milliGRAM(s) Oral every 3 hours PRN Severe Pain (7 - 10)  HYDROmorphone  Injectable 0.5 milliGRAM(s) IV Push every 3 hours PRN Severe/ Breakthrough Pain (7 - 10)  magnesium hydroxide Suspension 30 milliLiter(s) Oral daily PRN Constipation  ondansetron Injectable 4 milliGRAM(s) IV Push every 6 hours PRN Nausea and/or Vomiting  oxyCODONE    IR 5 milliGRAM(s) Oral every 3 hours PRN Mild Pain (1 - 3)  oxyCODONE    IR 10 milliGRAM(s) Oral every 3 hours PRN Moderate Pain (4 - 6)  senna 2 Tablet(s) Oral at bedtime PRN Constipation      LABS:                          10.9   12.12 )-----------( 212      ( 26 Aug 2020 06:08 )             32.5     08-26    138  |  102  |  14.0  ----------------------------<  102<H>  4.1   |  25.0  |  0.83    Ca    8.8      26 Aug 2020 06:08    RADIOLOGY & ADDITIONAL TESTS:    < from: Xray Knee 1 or 2 Views, Right (08.25.20 @ 11:10) >     EXAM:  KNEE-RIGHT                          PROCEDURE DATE:  08/25/2020          INTERPRETATION:  HISTORY: Postoperative  knee replacement.    Two views of the RIGHT knee are submitted.    Evaluation demonstrates the presence of a tricompartmental knee replacement with the femoral, tibial and patellar components in proper anatomic alignment. There is no fracture .  Impression:  Knee prosthetic components in proper anatomical alignment.  TAMELA MCKEON M.D., ATTENDING RADIOLOGIST  This document has been electronically signed. Aug 25 2020 11:54AM        < end of copied text >        REVIEW OF SYSTEMS:    As above , all other systems are reviewed and are negative .     Vital Signs Last 24 Hrs  T(C): 36.8 (26 Aug 2020 07:24), Max: 36.8 (26 Aug 2020 07:24)  T(F): 98.2 (26 Aug 2020 07:24), Max: 98.2 (26 Aug 2020 07:24)  HR: 54 (26 Aug 2020 07:24) (45 - 66)  BP: 114/81 (26 Aug 2020 07:24) (96/55 - 127/79)  BP(mean): --  RR: 18 (26 Aug 2020 07:24) (10 - 18)  SpO2: 96% (26 Aug 2020 07:24) (92% - 99%)    PHYSICAL EXAM:    GENERAL: NAD, well-groomed, well-developed  HEAD:  Atraumatic, Normocephalic  EYES: EOMI, PERRLA, conjunctiva and sclera clear  NECK: Supple, No JVD, Normal thyroid  NERVOUS SYSTEM:  Alert & Oriented X3, no focal deficit  CHEST/LUNG: CTA b/l ,  no  rales, rhonchi, wheezing, or rubs  HEART: Regular rate and rhythm; No murmurs, rubs, or gallops  ABDOMEN: Soft, Nontender, Nondistended; Bowel sounds present  EXTREMITIES:  2+ Peripheral Pulses, No clubbing, cyanosis, or edema, R knee dressing + , clean and dry   LYMPH: No lymphadenopathy noted  SKIN: No rashes or lesions

## 2020-08-26 NOTE — PROGRESS NOTE ADULT - SUBJECTIVE AND OBJECTIVE BOX
SHA GUTIERREZ    [id number]    History: Patient seen and eval at bedside. Patient is doing well and is comfortable. The patient's pain is controlled using the prescribed pain medications. The patient is participating in physical therapy. Denies nausea, vomiting, chest pain, shortness of breath, abdominal pain or fever. Patient states he did experience alot of nausea yesterday after anesthesia but it has completely resolved and feels fine now.    Vital Signs Last 24 Hrs  T(C): 36.8 (26 Aug 2020 07:24), Max: 37.2 (25 Aug 2020 11:06)  T(F): 98.2 (26 Aug 2020 07:24), Max: 98.9 (25 Aug 2020 11:06)  HR: 54 (26 Aug 2020 07:24) (45 - 66)  BP: 114/81 (26 Aug 2020 07:24) (96/55 - 127/79)  RR: 18 (26 Aug 2020 07:24) (10 - 18)  SpO2: 96% (26 Aug 2020 07:24) (92% - 100%)                        10.9   12.12 )-----------( 212      ( 26 Aug 2020 06:08 )             32.5     08-26    138  |  102  |  14.0  ----------------------------<  102<H>  4.1   |  25.0  |  0.83    Ca    8.8      26 Aug 2020 06:08    PE: NAD, alert, awake  Right LE:   Knee dressing C/D/I, no drainage, no bleeding  EHL/TA/FHL/GS intact, DP pulse 2+  Gross sensation to LT intact distally, Calf soft, NT B/L    Troponin T, Serum (08.26.20 @ 06:08)    Troponin T, Serum: <0.01: Reference Interval for Troponin T  Less than 0.06 ng/mL - includes the 99th percentile of a healthy  population at a method C.V. of 10% or less.  NOTE: Troponin T is measured by the Roche CLIA method and these  results are not interchangable with Troponin I results since they are  different assays with different reference intervals. ng/mL    Primary Orthopedic Assessment:  • s/p RIGHT total knee replacement POD#1    Plan:   ·	DVT prophylaxis as prescribed - ASA/Plavix, including use of compression devices and ankle pumps  ·	Continue physical therapy  ·	Weightbearing as tolerated of the right lower extremity with assistance of a walker  ·	Incentive spirometry encouraged  ·	cardiology consult appreciated - troponin negative x 3   ·	Pain control as clinically indicated  ·	Discharge planning: home today pending PT and med clearance  ·

## 2020-08-26 NOTE — PROGRESS NOTE ADULT - SUBJECTIVE AND OBJECTIVE BOX
SHA GUTIERREZ  [id number]      Chief Complaint:  58 yo M Hx of HLD, CAD s/p PCI stents x  9,last one 3 yrs ago , Hx hiatal hernia ,  c/o intermittent pain in right knee for the past 4 years.     Interval History:  Nausea has resolved and he feels entirely back to baseline    Tele:  1 episode of asymptomatic bradycardia 39 -40 BPM      acetaminophen   Tablet .. 975 milliGRAM(s) Oral every 8 hours  aluminum hydroxide/magnesium hydroxide/simethicone Suspension 30 milliLiter(s) Oral four times a day PRN  aspirin enteric coated 325 milliGRAM(s) Oral two times a day  clopidogrel Tablet 75 milliGRAM(s) Oral daily  diphenhydrAMINE 25 milliGRAM(s) Oral at bedtime PRN  HYDROmorphone   Tablet 4 milliGRAM(s) Oral every 3 hours PRN  HYDROmorphone  Injectable 0.5 milliGRAM(s) IV Push every 3 hours PRN  lactated ringers. 1000 milliLiter(s) IV Continuous <Continuous>  magnesium hydroxide Suspension 30 milliLiter(s) Oral daily PRN  ondansetron Injectable 4 milliGRAM(s) IV Push every 6 hours PRN  oxyCODONE    IR 5 milliGRAM(s) Oral every 3 hours PRN  oxyCODONE    IR 10 milliGRAM(s) Oral every 3 hours PRN  pantoprazole    Tablet 40 milliGRAM(s) Oral before breakfast  polyethylene glycol 3350 17 Gram(s) Oral daily  senna 2 Tablet(s) Oral at bedtime PRN  sodium chloride 0.9% lock flush 3 milliLiter(s) IV Push every 8 hours          Physical Exam:  T(C): 36.8 (08-26-20 @ 07:24), Max: 36.8 (08-26-20 @ 07:24)  HR: 54 (08-26-20 @ 07:24) (45 - 66)  BP: 114/81 (08-26-20 @ 07:24) (96/55 - 127/79)  RR: 18 (08-26-20 @ 07:24) (10 - 18)  SpO2: 96% (08-26-20 @ 07:24) (92% - 100%)  Wt(kg): --  General: Comfortable in NAD  Neck: No JVD  CVS: nl s1s2, no s3  Pulm: CTA b/l  Abd: soft, non-tender  Ext: right knee swollen an wrapped  Neuro A&O x3  Psych: Normal affect    I&O's Summary    25 Aug 2020 07:01  -  26 Aug 2020 07:00  --------------------------------------------------------  IN: 3480 mL / OUT: 1600 mL / NET: 1880 mL          Labs:   26 Aug 2020 06:08    138    |  102    |  14.0   ----------------------------<  102    4.1     |  25.0   |  0.83     Ca    8.8        26 Aug 2020 06:08                            10.9   12.12 )-----------( 212      ( 26 Aug 2020 06:08 )             32.5       CARDIAC MARKERS ( 26 Aug 2020 06:08 )  x     / <0.01 ng/mL / x     / x     / x      CARDIAC MARKERS ( 25 Aug 2020 21:44 )  x     / <0.01 ng/mL / x     / x     / x      CARDIAC MARKERS ( 25 Aug 2020 16:34 )  x     / <0.01 ng/mL / x     / x     / x        Outpatient nuclear stress test (7/2020):  Small to moderate sized mid-basal inferolateral and basal inferior wall infarct with mild apoorva-infarct ischemia, LVEF 64%    Outpatient TTE (2/2020):  LVEF 55-60%, hypokinesis of the basal and mid inferolateral wall  Normal RV size and systolic function  Mild MR  No pericardial effusion    Cardiac Cath (2011):  LM: 20 % stenosis  Mid LAD: 0% at site of prior stent  Proximal LCx: 0% at site of prior stent  Distal LCx: 100% stenosis  Mid RCA: 0% at site of prior stent      Assessment and Plan:    Mr. Gutierrez is a 60 year old man with past medical history of Coronary artery disease (s/p multiple PCIs) and Hyperlipidemia POD 1 after elective right total knee arthroplasty. Cardiology consulted due to abnormal EKG, concerning for ST elevations and pauses seen on tele with bradycardia.  Notably had post anesthesia nausea and vomiting x many hours.    ECG reviewed and appears different from outpatient ECG, appears to have ST elevation in V3-V6 which can be due to early repolarization    troponins x 3 are all Negative    Recommendations:  []Plan to resume aspirin 81 mg and plavix perhaps in a few weeks if deemed safe by surgery given his history. Continue rosuvastatin 10 mg daily. Continue to monitor on telemetry.    symptoms were related to vagal etiology, agree with observation as indicated above. No acute intervention was recommended at this time.   DC Plans as per ortho

## 2020-08-26 NOTE — PHYSICAL THERAPY INITIAL EVALUATION ADULT - ADDITIONAL COMMENTS
Pt lives in an apartment with 1 steps to enter with left rails and 0 stairs inside.  Pt owns medical equipment: none   Pt lives with: sister   Someone is always available to provide assist. Pt lives in an apartment with 1 steps to enter with left rails and 0 stairs inside.  Pt owns medical equipment: RW, SAC, Commode   Pt lives with: sister   Someone is always available to provide assist.

## 2020-08-26 NOTE — PHYSICAL THERAPY INITIAL EVALUATION ADULT - GAIT TRAINING, PT EVAL
Time Frame:  1-2   days   Goal:  Modified Independent with RW x 250  feet / Stairs: pt is independent with stairs

## 2020-08-27 VITALS
DIASTOLIC BLOOD PRESSURE: 64 MMHG | RESPIRATION RATE: 18 BRPM | TEMPERATURE: 98 F | HEART RATE: 74 BPM | SYSTOLIC BLOOD PRESSURE: 121 MMHG

## 2020-08-27 LAB
ANION GAP SERPL CALC-SCNC: 12 MMOL/L — SIGNIFICANT CHANGE UP (ref 5–17)
BUN SERPL-MCNC: 14 MG/DL — SIGNIFICANT CHANGE UP (ref 8–20)
CALCIUM SERPL-MCNC: 8.8 MG/DL — SIGNIFICANT CHANGE UP (ref 8.6–10.2)
CHLORIDE SERPL-SCNC: 101 MMOL/L — SIGNIFICANT CHANGE UP (ref 98–107)
CO2 SERPL-SCNC: 25 MMOL/L — SIGNIFICANT CHANGE UP (ref 22–29)
CREAT SERPL-MCNC: 0.92 MG/DL — SIGNIFICANT CHANGE UP (ref 0.5–1.3)
GLUCOSE SERPL-MCNC: 95 MG/DL — SIGNIFICANT CHANGE UP (ref 70–99)
HCT VFR BLD CALC: 32 % — LOW (ref 39–50)
HGB BLD-MCNC: 10.7 G/DL — LOW (ref 13–17)
MCHC RBC-ENTMCNC: 32.4 PG — SIGNIFICANT CHANGE UP (ref 27–34)
MCHC RBC-ENTMCNC: 33.4 GM/DL — SIGNIFICANT CHANGE UP (ref 32–36)
MCV RBC AUTO: 97 FL — SIGNIFICANT CHANGE UP (ref 80–100)
PLATELET # BLD AUTO: 191 K/UL — SIGNIFICANT CHANGE UP (ref 150–400)
POTASSIUM SERPL-MCNC: 4 MMOL/L — SIGNIFICANT CHANGE UP (ref 3.5–5.3)
POTASSIUM SERPL-SCNC: 4 MMOL/L — SIGNIFICANT CHANGE UP (ref 3.5–5.3)
RBC # BLD: 3.3 M/UL — LOW (ref 4.2–5.8)
RBC # FLD: 12.5 % — SIGNIFICANT CHANGE UP (ref 10.3–14.5)
SODIUM SERPL-SCNC: 138 MMOL/L — SIGNIFICANT CHANGE UP (ref 135–145)
WBC # BLD: 7.27 K/UL — SIGNIFICANT CHANGE UP (ref 3.8–10.5)
WBC # FLD AUTO: 7.27 K/UL — SIGNIFICANT CHANGE UP (ref 3.8–10.5)

## 2020-08-27 PROCEDURE — C1713: CPT

## 2020-08-27 PROCEDURE — 36415 COLL VENOUS BLD VENIPUNCTURE: CPT

## 2020-08-27 PROCEDURE — 86901 BLOOD TYPING SEROLOGIC RH(D): CPT

## 2020-08-27 PROCEDURE — 86900 BLOOD TYPING SEROLOGIC ABO: CPT

## 2020-08-27 PROCEDURE — 73560 X-RAY EXAM OF KNEE 1 OR 2: CPT

## 2020-08-27 PROCEDURE — 80048 BASIC METABOLIC PNL TOTAL CA: CPT

## 2020-08-27 PROCEDURE — C1776: CPT

## 2020-08-27 PROCEDURE — 97110 THERAPEUTIC EXERCISES: CPT

## 2020-08-27 PROCEDURE — 86850 RBC ANTIBODY SCREEN: CPT

## 2020-08-27 PROCEDURE — 97116 GAIT TRAINING THERAPY: CPT

## 2020-08-27 PROCEDURE — 99232 SBSQ HOSP IP/OBS MODERATE 35: CPT

## 2020-08-27 PROCEDURE — 97163 PT EVAL HIGH COMPLEX 45 MIN: CPT

## 2020-08-27 PROCEDURE — 93005 ELECTROCARDIOGRAM TRACING: CPT

## 2020-08-27 PROCEDURE — 82962 GLUCOSE BLOOD TEST: CPT

## 2020-08-27 PROCEDURE — 84484 ASSAY OF TROPONIN QUANT: CPT

## 2020-08-27 PROCEDURE — 97167 OT EVAL HIGH COMPLEX 60 MIN: CPT

## 2020-08-27 PROCEDURE — 85027 COMPLETE CBC AUTOMATED: CPT

## 2020-08-27 RX ADMIN — Medication 975 MILLIGRAM(S): at 04:59

## 2020-08-27 RX ADMIN — CELECOXIB 200 MILLIGRAM(S): 200 CAPSULE ORAL at 04:58

## 2020-08-27 RX ADMIN — PANTOPRAZOLE SODIUM 40 MILLIGRAM(S): 20 TABLET, DELAYED RELEASE ORAL at 04:59

## 2020-08-27 RX ADMIN — OXYCODONE HYDROCHLORIDE 10 MILLIGRAM(S): 5 TABLET ORAL at 05:28

## 2020-08-27 RX ADMIN — ONDANSETRON 4 MILLIGRAM(S): 8 TABLET, FILM COATED ORAL at 08:24

## 2020-08-27 RX ADMIN — SODIUM CHLORIDE 3 MILLILITER(S): 9 INJECTION INTRAMUSCULAR; INTRAVENOUS; SUBCUTANEOUS at 05:02

## 2020-08-27 RX ADMIN — OXYCODONE HYDROCHLORIDE 10 MILLIGRAM(S): 5 TABLET ORAL at 04:58

## 2020-08-27 RX ADMIN — CLOPIDOGREL BISULFATE 75 MILLIGRAM(S): 75 TABLET, FILM COATED ORAL at 12:07

## 2020-08-27 RX ADMIN — POLYETHYLENE GLYCOL 3350 17 GRAM(S): 17 POWDER, FOR SOLUTION ORAL at 12:07

## 2020-08-27 RX ADMIN — Medication 81 MILLIGRAM(S): at 05:02

## 2020-08-27 RX ADMIN — Medication 975 MILLIGRAM(S): at 05:28

## 2020-08-27 RX ADMIN — CELECOXIB 200 MILLIGRAM(S): 200 CAPSULE ORAL at 05:28

## 2020-08-27 NOTE — PROGRESS NOTE ADULT - SUBJECTIVE AND OBJECTIVE BOX
Patient seen and examined . S/p  R TKA  , POD # 2. Pain well controlled , c/o mild nausea after taking PO meds on empty stomach ,   no vomiting , tolerating PO intake ,no sob , no cp ,  voiding without difficulties , participating with physical therapy .     CC : R knee pain well controlled postop       MEDICATIONS  (STANDING):  acetaminophen   Tablet .. 975 milliGRAM(s) Oral every 8 hours  aspirin enteric coated 81 milliGRAM(s) Oral two times a day  celecoxib 200 milliGRAM(s) Oral two times a day  clopidogrel Tablet 75 milliGRAM(s) Oral daily  lactated ringers. 1000 milliLiter(s) (150 mL/Hr) IV Continuous <Continuous>  pantoprazole    Tablet 40 milliGRAM(s) Oral before breakfast  polyethylene glycol 3350 17 Gram(s) Oral daily  sodium chloride 0.9% lock flush 3 milliLiter(s) IV Push every 8 hours    MEDICATIONS  (PRN):  aluminum hydroxide/magnesium hydroxide/simethicone Suspension 30 milliLiter(s) Oral four times a day PRN Indigestion  bisacodyl Suppository 10 milliGRAM(s) Rectal daily PRN If no bowel movement by postoperative day #2  diphenhydrAMINE 25 milliGRAM(s) Oral at bedtime PRN Insomnia  HYDROmorphone   Tablet 4 milliGRAM(s) Oral every 3 hours PRN Severe Pain (7 - 10)  HYDROmorphone  Injectable 0.5 milliGRAM(s) IV Push every 3 hours PRN Severe/ Breakthrough Pain (7 - 10)  magnesium hydroxide Suspension 30 milliLiter(s) Oral daily PRN Constipation  ondansetron Injectable 4 milliGRAM(s) IV Push every 6 hours PRN Nausea and/or Vomiting  oxyCODONE    IR 5 milliGRAM(s) Oral every 3 hours PRN Mild Pain (1 - 3)  oxyCODONE    IR 10 milliGRAM(s) Oral every 3 hours PRN Moderate Pain (4 - 6)  senna 2 Tablet(s) Oral at bedtime PRN Constipation      LABS:                          10.7   7.27  )-----------( 191      ( 27 Aug 2020 06:03 )             32.0     08-27    138  |  101  |  14.0  ----------------------------<  95  4.0   |  25.0  |  0.92    Ca    8.8      27 Aug 2020 06:03    RADIOLOGY & ADDITIONAL TESTS:  < from: Xray Knee 1 or 2 Views, Right (08.25.20 @ 11:10) >   EXAM:  KNEE-RIGHT                          PROCEDURE DATE:  08/25/2020          INTERPRETATION:  HISTORY: Postoperative  knee replacement.    Two views of the RIGHT knee are submitted.    Evaluation demonstrates the presence of a tricompartmental knee replacement with the femoral, tibial and patellar components in proper anatomic alignment. There is no fracture .  Impression:  Knee prosthetic components in proper anatomical alignment.    TAMELA MCKEON M.D., ATTENDING RADIOLOGIST  This document has been electronically signed. Aug 25 2020 11:54AM    < end of copied text >          REVIEW OF SYSTEMS:   R knee pain well controlled , mild nausea + , all other systems are reviewed and are negative .     Vital Signs Last 24 Hrs  T(C): 36.6 (27 Aug 2020 08:04), Max: 36.9 (26 Aug 2020 19:21)  T(F): 97.9 (27 Aug 2020 08:04), Max: 98.5 (26 Aug 2020 19:21)  HR: 58 (27 Aug 2020 08:25) (57 - 80)  BP: 118/70 (27 Aug 2020 08:25) (109/71 - 131/70)  BP(mean): --  RR: 18 (27 Aug 2020 08:25) (18 - 18)  SpO2: 93% (27 Aug 2020 08:25) (93% - 99%)    PHYSICAL EXAM:    GENERAL: NAD, well-groomed, well-developed  HEAD:  Atraumatic, Normocephalic  EYES: EOMI, PERRLA, conjunctiva and sclera clear  NECK: Supple, No JVD, Normal thyroid  NERVOUS SYSTEM:  Alert & Oriented X3, no focal deficit  CHEST/LUNG: CTA b/l ,  no  rales, rhonchi, wheezing, or rubs  HEART: Regular rate and rhythm; No murmurs, rubs, or gallops  ABDOMEN: Soft, Nontender, Nondistended; Bowel sounds present  EXTREMITIES:  2+ Peripheral Pulses, No clubbing, cyanosis, or edema , R knee dressing + ,clean and dry   LYMPH: No lymphadenopathy noted  SKIN: No rashes or lesions

## 2020-08-27 NOTE — PROGRESS NOTE ADULT - SUBJECTIVE AND OBJECTIVE BOX
Ortho Post Op Check    Name: SHA GUTIERREZ    MR #: [id number]    Procedure: Right total knee arthroplasty   Surgeon: Dr Qureshi     Pt comfortable without complaints, pain controlled  Denies CP, SOB, N/V, numbness/tingling     General Exam:  Vital Signs Last 24 Hrs  T(C): 36.6 (08-27-20 @ 08:04), Max: 36.8 (08-27-20 @ 04:56)  T(F): 97.9 (08-27-20 @ 08:04), Max: 98.2 (08-27-20 @ 04:56)  HR: 58 (08-27-20 @ 08:25) (57 - 80)  BP: 118/70 (08-27-20 @ 08:25) (109/71 - 131/70)  BP(mean): --  RR: 18 (08-27-20 @ 08:25) (18 - 18)  SpO2: 93% (08-27-20 @ 08:25) (93% - 97%)    General: Pt Alert and oriented, NAD, controlled pain.  Right  knee ace wrap Dressings C/D/I. No bleeding.  Pulses: 2+ dorsalis pedis pulse. Cap refill < 2 sec.  Sensation: Grossly intact to light touch without deficit.  Motor: + EHL/FHL/TA/GS  Calf  soft, supple and nontender                         10.7   7.27  )-----------( 191      ( 27 Aug 2020 06:03 )             32.0   27 Aug 2020 06:03    138    |  101    |  14.0   ----------------------------<  95     4.0     |  25.0   |  0.92     Ca    8.8        27 Aug 2020 06:03        Post-op X-Ray:  EXAM:  KNEE-RIGHT                          PROCEDURE DATE:  08/25/2020          INTERPRETATION:  HISTORY: Postoperative  knee replacement.    Two views of the RIGHT knee are submitted.    Evaluation demonstrates the presence of a tricompartmental knee replacement with the femoral, tibial and patellar components in proper anatomic alignment. There is no fracture .  Impression:  Knee prosthetic components in proper anatomical alignment.          TAMELA MCKEON M.D., ATTENDING RADIOLOGIST  This document has been electronically signed. Aug 25 2020 11:54AM    MEDICATIONS  (STANDING):  acetaminophen   Tablet .. 975 milliGRAM(s) Oral every 8 hours  aspirin enteric coated 81 milliGRAM(s) Oral two times a day  celecoxib 200 milliGRAM(s) Oral two times a day  clopidogrel Tablet 75 milliGRAM(s) Oral daily  lactated ringers. 1000 milliLiter(s) (150 mL/Hr) IV Continuous <Continuous>  pantoprazole    Tablet 40 milliGRAM(s) Oral before breakfast  polyethylene glycol 3350 17 Gram(s) Oral daily  sodium chloride 0.9% lock flush 3 milliLiter(s) IV Push every 8 hours    MEDICATIONS  (PRN):  aluminum hydroxide/magnesium hydroxide/simethicone Suspension 30 milliLiter(s) Oral four times a day PRN Indigestion  bisacodyl Suppository 10 milliGRAM(s) Rectal daily PRN If no bowel movement by postoperative day #2  diphenhydrAMINE 25 milliGRAM(s) Oral at bedtime PRN Insomnia  HYDROmorphone   Tablet 4 milliGRAM(s) Oral every 3 hours PRN Severe Pain (7 - 10)  HYDROmorphone  Injectable 0.5 milliGRAM(s) IV Push every 3 hours PRN Severe/ Breakthrough Pain (7 - 10)  magnesium hydroxide Suspension 30 milliLiter(s) Oral daily PRN Constipation  ondansetron Injectable 4 milliGRAM(s) IV Push every 6 hours PRN Nausea and/or Vomiting  oxyCODONE    IR 5 milliGRAM(s) Oral every 3 hours PRN Mild Pain (1 - 3)  oxyCODONE    IR 10 milliGRAM(s) Oral every 3 hours PRN Moderate Pain (4 - 6)  senna 2 Tablet(s) Oral at bedtime PRN Constipation    A/P: 60yMale POD#2 s/p Right total knee arthroplasty    - Stable  - Pain Control  - DVT ppx: ASA and Plavox  - Post op abx:  - PT eval pending  - Weight bearing status: WBAT  - DC to home today

## 2020-08-27 NOTE — PROGRESS NOTE ADULT - ASSESSMENT
58 yo M Hx of HLD, CAD s/p PCI stents x9,last one 3 yrs ago , hiatal hernia ,  c/o intermittent pain in right knee for the past 4 years.  Pt denies trauma or injury to the right knee. Pt denies using medication for pain. Pain is worsened by bending, direct pressure, walking, knee flexion and extension. Pt has large bakers cyst to knee, drained several times. Pt unable to work or walk due to pain. Pt had multiple injections with very little relief and severe varus deformity. Now patient is s/p R TKA  , POD #1 . Post op with prolong nausea / vomiting , EKG done  concerning for ST elevations and pauses seen on tele with bradycardia . Cardiology called - appreciated , seen by Interventional cardiologist also .      Problem/Recommendation - 1:  Problem: Primary osteoarthritis of right knee. Recommendation: S/P R TKA.     Problem/Recommendation - 2:  ·  Problem: Status post right knee replacement.  Recommendation: PT/OT/pain mgmt  DVT prophylaxis- as per ortho  Abx as per SCIP- given   Incentive spirometry  Prophylaxis of opioid  induced constipation.      Problem/Recommendation - 3:  ·  Problem: Coronary artery disease involving native coronary artery of native heart without angina pectoris.  Recommendation: - continue ASA / statins , BB on hold due to postop bradycardia , 2 sec pause . D/W Dr Ramírez - recommended not to continue BB - D/C Home without BB , follow with own cardiologist Dr Nuñez in 2 wks .      Problem/Recommendation - 4:  ·  Problem: Postoperative bradycardia.  Recommendation: likely due to postoperative vagal etiology . HR in 50's now , patient is asymptomatic , no sob , no cp . bb on hold for now , f/u with cardio as on outpatient in 2 wks for further recommendations .      Problem/Recommendation - 5:  ·  Problem: Postoperative nausea.  Recommendation: - as above , continue Zofran , advised not to take PO meds on empty stomach .      Problem/Recommendation - 6:  Problem: Need for prophylactic measure. Recommendation: DVT prophylaxis  - as per ortho protocol- on ASA BID   Opioid induced constipation  prophylaxis - bowel regimen.     Problem/Recommendation - 7:  Problem: Hyperlipidemia. Recommendation: continue statins    Problem / Plan - 8: Acute blood lost anemia - secondary to surgical blood lost -  asymptomatic.    Problem / Plan - 9: Leucocytosis - no S/S of infection , likely reactive  - resolved .     Dispo plan - Home - today   D/W patient / ortho PA / nurse .    Medically stable to d/c once cleared by physical therapy/ ortho .

## 2020-08-27 NOTE — PROGRESS NOTE ADULT - PROVIDER SPECIALTY LIST ADULT
Orthopedics Subjective     Meli Crain is a 64 y.o. female.     History of Present Illness     She is the wife of Yobany Crain, also a new patient of ours.  She is a retired  from [employer].  Dr. Rebollar has been her physician.  Dr. Cheema was her physician for many years, then Dr. Rene.  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  She is on both sotalol and metoprolol.  Also on Cardizem CD.  With her initial labs, we discovered vitamin B12 deficiency anemia and severe vitamin D deficiency.   She is on Eliquis for chronic anticoagulation.  Other medical issues include hypothyroidism, hypertension, obesity, and hyperlipidemia.  She takes Lyrica chronically due to right upper extremity pain that persists following a right upper extremity trauma several years ago.  Her right arm is deformed with diminished range of motion due to that past injury.  She is obese, but denies additional weight gain since starting the Lyrica.    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  She feels that her foot pain limits her physical activity and exercise.  We have discussed the benefits of Silver sneakers program in [place] and especially water aerobics.  She has not initiated that yet.    I have reviewed her initial labs.  Pertinent labs include vitamin B12 261 with hemoglobin decreased from 13.4-11.6 in 5 months.  Her vitamin D level is undetectable.    She continues to experience dependent edema of the bilateral lower extremities consistent with chronic venous insufficiency aggravated by her obesity.  We reviewed appropriate management of chronic venous insufficiency and dependent edema.    Her blood pressure is well controlled today.  Her weight is stable.  Her BMI is  "39.    Review of Systems   Constitutional: Positive for fatigue. Negative for activity change, appetite change, chills, fever and unexpected weight change.   HENT: Negative for congestion, ear pain, facial swelling, hearing loss, postnasal drip, sinus pressure and sore throat.    Eyes: Negative for discharge and visual disturbance.   Respiratory: Negative for cough, chest tightness, shortness of breath and wheezing.    Cardiovascular: Positive for leg swelling. Negative for chest pain and palpitations.   Gastrointestinal: Negative for abdominal pain, blood in stool, constipation, diarrhea, nausea and vomiting.   Endocrine: Negative for cold intolerance, heat intolerance, polydipsia and polyuria.   Genitourinary: Negative for difficulty urinating, dysuria, flank pain, frequency, hematuria and urgency.   Musculoskeletal: Positive for arthralgias. Negative for myalgias.   Skin: Negative for color change and rash.   Allergic/Immunologic: Negative for food allergies.   Neurological: Negative for dizziness, syncope, weakness, numbness and headaches.   Hematological: Negative for adenopathy.   Psychiatric/Behavioral: Negative for decreased concentration, dysphoric mood, hallucinations, self-injury, sleep disturbance and suicidal ideas. The patient is not nervous/anxious.    All other systems reviewed and are negative.      Objective     /80   Pulse 72   Temp 98.1 °F (36.7 °C) (Oral)   Ht 167.6 cm (66\")   Wt 110 kg (241 lb 12.8 oz)   BMI 39.03 kg/m²     Physical Exam   Constitutional: She is oriented to person, place, and time. She appears well-developed and well-nourished. No distress.   Pleasant, intelligent, articulate, obese.   HENT:   Head: Normocephalic and atraumatic.   Nose: Right sinus exhibits no maxillary sinus tenderness and no frontal sinus tenderness. Left sinus exhibits no maxillary sinus tenderness and no frontal sinus tenderness.   Mouth/Throat: Uvula is midline, oropharynx is clear and moist and " mucous membranes are normal. No oral lesions. No tonsillar exudate.   Eyes: Conjunctivae and EOM are normal. Pupils are equal, round, and reactive to light.   Neck: Trachea normal. Neck supple. No JVD present. Carotid bruit is not present. No tracheal deviation present. No thyroid mass and no thyromegaly present.   Cardiovascular: Normal rate, regular rhythm, normal heart sounds and intact distal pulses.   No extrasystoles are present. PMI is not displaced.    No murmur heard.  Pulmonary/Chest: Effort normal and breath sounds normal. No accessory muscle usage. No respiratory distress. She has no decreased breath sounds. She has no wheezes. She has no rhonchi. She has no rales.   Abdominal: Soft. Bowel sounds are normal. She exhibits no distension. There is no hepatosplenomegaly. There is no tenderness.   Obese abdomen limits exam.   Musculoskeletal:   The right arm has limited range of motion and a chronic flexion of the right elbow with inability to fully extend.  Arthritic changes noted in the right wrist and hand.     Vascular Status -  Her right foot exhibits abnormal foot edema (Trace edema bilateral lower extremities and ankles with evidence of chronic venous insufficiency). Her right foot exhibits normal foot vasculature . Her left foot exhibits abnormal foot edema. Her left foot exhibits normal foot vasculature .  Lymphadenopathy:     She has no cervical adenopathy.   Neurological: She is alert and oriented to person, place, and time. No cranial nerve deficit. Coordination normal.   Skin: Skin is warm, dry and intact. No rash noted. No cyanosis. Nails show no clubbing.   Psychiatric: She has a normal mood and affect. Her speech is normal and behavior is normal. Judgment and thought content normal.   Vitals reviewed.      PHQ-2/PHQ-9 Depression Screening 5/2/2018   Little interest or pleasure in doing things 0   Feeling down, depressed, or hopeless 0   Total Score 0       Assessment/Plan     Start vitamin B12  500 µg by mouth daily.  The patient receives a vitamin B12 shot today without difficulty or complication.    Start vitamin D 50,000 units weekly for her profound vitamin D deficiency.  Take Caltrate daily.  She reports extreme sun sensitivity, therefore does not get very much sunlight exposure.  She is fair complected.    Address the lower extremity edema by pursuing aggressive weight loss with sodium restriction, compression stockings, and elevation of the legs when not ambulating.    Continue the current blood pressure medications.    Continue the current low dose of Synthroid.    Continue the Lipitor.  Intensify dietary efforts.    Continue the Eliquis in this patient with history of atrial fibrillation.    We urged aggressive weight loss efforts following the principles of diabetic diet.  She reports that her feet will not allow her to tolerate traditional exercise.  I recommended the Silver sneakers program and water aerobics.    Return to clinic in 3 months with repeat CBC, vitamin B12, and vitamin D prior.  These labs can be nonfasting.  We will then need to schedule her next routine set of labs and follow-up appointment in November.    Diagnoses and all orders for this visit:    Essential hypertension    Mixed hyperlipidemia    Venous insufficiency (chronic) (peripheral)    Chronic atrial fibrillation    Gastroesophageal reflux disease, esophagitis presence not specified    Class 2 obesity due to excess calories with serious comorbidity and body mass index (BMI) of 39.0 to 39.9 in adult    Vitamin D deficiency  -     Vitamin D 25 Hydroxy; Future    Acquired hypothyroidism    IFG (impaired fasting glucose)    Vitamin B12 deficiency (dietary) anemia  -     CBC Auto Differential; Future  -     Vitamin B12; Future  -     cyanocobalamin injection 1,000 mcg; Inject 1 mL into the shoulder, thigh, or buttocks 1 (One) Time.    Other orders  -     fluticasone (FLONASE) 50 MCG/ACT nasal spray; 2 sprays into each  nostril Daily.  -     vitamin D (ERGOCALCIFEROL) 52961 units capsule capsule; Take 1 capsule by mouth 1 (One) Time Per Week.        Lab on 05/07/2018   Component Date Value Ref Range Status   • Occult Blood 05/07/2018 Negative  Negative Final   • Occult Blood 2 05/07/2018 Negative  Negative Final   • Occult Blood 3 05/07/2018 Negative  Negative Final   • OB Date 1 05/07/2018 na   Final   • OB Time 1 05/07/2018 na   Final   • OB Date 2 05/07/2018 na   Final   • OB Time 2 05/07/2018 na   Final   • OB Date 3 05/07/2018 na   Final   • OB Time 3 05/07/2018 na   Final   Lab on 05/03/2018   Component Date Value Ref Range Status   • WBC 05/03/2018 6.76  3.20 - 9.80 10*3/mm3 Final   • RBC 05/03/2018 3.81  3.77 - 5.16 10*6/mm3 Final   • Hemoglobin 05/03/2018 11.6* 12.0 - 15.5 g/dL Final   • Hematocrit 05/03/2018 37.1  35.0 - 45.0 % Final   • MCV 05/03/2018 97.4  80.0 - 98.0 fL Final   • MCH 05/03/2018 30.4  26.5 - 34.0 pg Final   • MCHC 05/03/2018 31.3* 31.4 - 36.0 g/dL Final   • RDW 05/03/2018 13.5  11.5 - 14.5 % Final   • RDW-SD 05/03/2018 46.1  36.4 - 46.3 fl Final   • MPV 05/03/2018 10.9  8.0 - 12.0 fL Final   • Platelets 05/03/2018 226  150 - 450 10*3/mm3 Final   • Neutrophil % 05/03/2018 62.5  37.0 - 80.0 % Final   • Lymphocyte % 05/03/2018 28.6  10.0 - 50.0 % Final   • Monocyte % 05/03/2018 7.0  0.0 - 12.0 % Final   • Eosinophil % 05/03/2018 1.5  0.0 - 7.0 % Final   • Basophil % 05/03/2018 0.4  0.0 - 2.0 % Final   • Neutrophils, Absolute 05/03/2018 4.23  2.00 - 8.60 10*3/mm3 Final   • Lymphocytes, Absolute 05/03/2018 1.93  0.60 - 4.20 10*3/mm3 Final   • Monocytes, Absolute 05/03/2018 0.47  0.00 - 0.90 10*3/mm3 Final   • Eosinophils, Absolute 05/03/2018 0.10  0.00 - 0.70 10*3/mm3 Final   • Basophils, Absolute 05/03/2018 0.03  0.00 - 0.20 10*3/mm3 Final   • Glucose 05/03/2018 106* 70 - 100 mg/dL Final   • BUN 05/03/2018 13  8 - 25 mg/dL Final   • Creatinine 05/03/2018 0.80  0.40 - 1.30 mg/dL Final   • Sodium 05/03/2018  140  134 - 146 mmol/L Final   • Potassium 05/03/2018 4.4  3.4 - 5.4 mmol/L Final   • Chloride 05/03/2018 108  100 - 112 mmol/L Final   • CO2 05/03/2018 26.0  20.0 - 32.0 mmol/L Final   • Calcium 05/03/2018 8.6  8.4 - 10.8 mg/dL Final   • Total Protein 05/03/2018 6.9  6.7 - 8.2 g/dL Final   • Albumin 05/03/2018 3.50  3.20 - 5.50 g/dL Final   • ALT (SGPT) 05/03/2018 12  10 - 60 U/L Final   • AST (SGOT) 05/03/2018 19  10 - 60 U/L Final   • Alkaline Phosphatase 05/03/2018 79  15 - 121 U/L Final   • Total Bilirubin 05/03/2018 0.9  0.2 - 1.0 mg/dL Final   • eGFR Non African Amer 05/03/2018 72  45 - 104 mL/min/1.73 Final   • Globulin 05/03/2018 3.4  2.5 - 4.6 gm/dL Final   • A/G Ratio 05/03/2018 1.0  1.0 - 3.0 g/dL Final   • BUN/Creatinine Ratio 05/03/2018 16.3  7.0 - 25.0 Final   • Anion Gap 05/03/2018 6.0  5.0 - 15.0 mmol/L Final   • Vitamin B-12 05/03/2018 261  239 - 931 pg/mL Final   • Hemoglobin A1C 05/03/2018 5.8* 4 - 5.6 % Final   • Total Cholesterol 05/03/2018 155  150 - 200 mg/dL Final   • Triglycerides 05/03/2018 123  35 - 160 mg/dL Final   • HDL Cholesterol 05/03/2018 42  35 - 100 mg/dL Final   • LDL Cholesterol  05/03/2018 88  mg/dL Final   • VLDL Cholesterol 05/03/2018 24.6  mg/dL Final   • LDL/HDL Ratio 05/03/2018 2.10   Final   • TSH 05/03/2018 1.540  0.460 - 4.680 mIU/mL Final   • Free T4 05/03/2018 1.43  0.78 - 2.19 ng/dL Final   • 25 Hydroxy, Vitamin D 05/03/2018 <12.8* 30.0 - 100.0 ng/ml Final   • Ferritin 05/04/2018 32.80  11.10 - 264.00 ng/mL Final   • Iron 05/04/2018 58  37 - 170 mcg/dL Final   • TIBC 05/04/2018 350  265 - 497 mcg/dL Final   • Iron Saturation 05/04/2018 17  15 - 50 % Final   ]

## 2020-09-04 ENCOUNTER — APPOINTMENT (OUTPATIENT)
Dept: ORTHOPEDIC SURGERY | Facility: CLINIC | Age: 60
End: 2020-09-04
Payer: MEDICARE

## 2020-09-04 PROCEDURE — 99024 POSTOP FOLLOW-UP VISIT: CPT

## 2020-09-04 NOTE — HISTORY OF PRESENT ILLNESS
[Home] : at home, [unfilled] , at the time of the visit. [Medical Office: ([facility])___] : at the medical office located in  [Verbal consent obtained from patient] : the patient, [unfilled] [de-identified] : We set up a telehealth visit for the patient today for wound check [de-identified] : Well developed well nourished patient in no acute distress.  alert and oriented x 3. normal mood and affect. skin without rashes.  breathing non-labored. normal balance and gait.  Were able to examine the incision on his knee there is no sign of wound dehiscence his incision is healing well there is mild erythema at the most proximal aspect of the incision but no sign of infection [de-identified] : The patient appears to be doing well his wound is healing without complication we reassured him who will maintain sterile dressing on the wound to see him back at his previously scheduled postop visit.  He will contact us if he has any worsening symptoms [de-identified] : The patient is approximately 2 weeks out from right knee replacement.  He called today concerned about the top of his incision we conducted a telehealth to examine the wound.  The patient is in outpatient physical therapy his pain is currently controlled he is walking without a cane or crutch

## 2020-09-09 RX ORDER — KIT FOR THE PREPARATION OF TECHNETIUM TC99M SESTAMIBI 1 MG/5ML
INJECTION, POWDER, LYOPHILIZED, FOR SOLUTION PARENTERAL
Refills: 0 | Status: COMPLETED | OUTPATIENT
Start: 2020-09-09

## 2020-09-09 RX ADMIN — KIT FOR THE PREPARATION OF TECHNETIUM TC99M SESTAMIBI 0: 1 INJECTION, POWDER, LYOPHILIZED, FOR SOLUTION PARENTERAL at 00:00

## 2020-09-16 ENCOUNTER — TRANSCRIPTION ENCOUNTER (OUTPATIENT)
Age: 60
End: 2020-09-16

## 2020-09-16 ENCOUNTER — INPATIENT (INPATIENT)
Facility: HOSPITAL | Age: 60
LOS: 4 days | Discharge: HOME | DRG: 857 | End: 2020-09-21
Attending: ORTHOPAEDIC SURGERY | Admitting: ORTHOPAEDIC SURGERY
Payer: MEDICARE

## 2020-09-16 ENCOUNTER — APPOINTMENT (OUTPATIENT)
Dept: ORTHOPEDIC SURGERY | Facility: CLINIC | Age: 60
End: 2020-09-16
Payer: MEDICARE

## 2020-09-16 VITALS
OXYGEN SATURATION: 99 % | HEIGHT: 69 IN | HEART RATE: 92 BPM | WEIGHT: 210.1 LBS | SYSTOLIC BLOOD PRESSURE: 130 MMHG | TEMPERATURE: 99 F | RESPIRATION RATE: 18 BRPM | DIASTOLIC BLOOD PRESSURE: 73 MMHG

## 2020-09-16 VITALS
WEIGHT: 207 LBS | BODY MASS INDEX: 30.66 KG/M2 | HEART RATE: 85 BPM | HEIGHT: 69 IN | SYSTOLIC BLOOD PRESSURE: 118 MMHG | DIASTOLIC BLOOD PRESSURE: 83 MMHG

## 2020-09-16 VITALS — TEMPERATURE: 98.6 F

## 2020-09-16 DIAGNOSIS — T81.9XXA UNSPECIFIED COMPLICATION OF PROCEDURE, INITIAL ENCOUNTER: ICD-10-CM

## 2020-09-16 DIAGNOSIS — Z96.651 PRESENCE OF RIGHT ARTIFICIAL KNEE JOINT: Chronic | ICD-10-CM

## 2020-09-16 DIAGNOSIS — Z98.890 OTHER SPECIFIED POSTPROCEDURAL STATES: Chronic | ICD-10-CM

## 2020-09-16 LAB
ALBUMIN SERPL ELPH-MCNC: 4.4 G/DL — SIGNIFICANT CHANGE UP (ref 3.3–5.2)
ALP SERPL-CCNC: 120 U/L — SIGNIFICANT CHANGE UP (ref 40–120)
ALT FLD-CCNC: 27 U/L — SIGNIFICANT CHANGE UP
ANION GAP SERPL CALC-SCNC: 12 MMOL/L — SIGNIFICANT CHANGE UP (ref 5–17)
AST SERPL-CCNC: 44 U/L — HIGH
BILIRUB SERPL-MCNC: 0.3 MG/DL — LOW (ref 0.4–2)
BLD GP AB SCN SERPL QL: SIGNIFICANT CHANGE UP
BUN SERPL-MCNC: 17 MG/DL — SIGNIFICANT CHANGE UP (ref 8–20)
CALCIUM SERPL-MCNC: 9.1 MG/DL — SIGNIFICANT CHANGE UP (ref 8.6–10.2)
CHLORIDE SERPL-SCNC: 100 MMOL/L — SIGNIFICANT CHANGE UP (ref 98–107)
CO2 SERPL-SCNC: 25 MMOL/L — SIGNIFICANT CHANGE UP (ref 22–29)
CREAT SERPL-MCNC: 1.13 MG/DL — SIGNIFICANT CHANGE UP (ref 0.5–1.3)
CRP SERPL-MCNC: 2.15 MG/DL — HIGH (ref 0–0.4)
ERYTHROCYTE [SEDIMENTATION RATE] IN BLOOD: 37 MM/HR — HIGH (ref 0–20)
GLUCOSE SERPL-MCNC: 97 MG/DL — SIGNIFICANT CHANGE UP (ref 70–99)
HCT VFR BLD CALC: 33.4 % — LOW (ref 39–50)
HGB BLD-MCNC: 11.1 G/DL — LOW (ref 13–17)
INR BLD: 1.2 RATIO — HIGH (ref 0.88–1.16)
MCHC RBC-ENTMCNC: 32.2 PG — SIGNIFICANT CHANGE UP (ref 27–34)
MCHC RBC-ENTMCNC: 33.2 GM/DL — SIGNIFICANT CHANGE UP (ref 32–36)
MCV RBC AUTO: 96.8 FL — SIGNIFICANT CHANGE UP (ref 80–100)
PLATELET # BLD AUTO: 336 K/UL — SIGNIFICANT CHANGE UP (ref 150–400)
POTASSIUM SERPL-MCNC: 3.9 MMOL/L — SIGNIFICANT CHANGE UP (ref 3.5–5.3)
POTASSIUM SERPL-SCNC: 3.9 MMOL/L — SIGNIFICANT CHANGE UP (ref 3.5–5.3)
PROT SERPL-MCNC: 6.7 G/DL — SIGNIFICANT CHANGE UP (ref 6.6–8.7)
PROTHROM AB SERPL-ACNC: 13.8 SEC — HIGH (ref 10.6–13.6)
RBC # BLD: 3.45 M/UL — LOW (ref 4.2–5.8)
RBC # FLD: 13 % — SIGNIFICANT CHANGE UP (ref 10.3–14.5)
SARS-COV-2 RNA SPEC QL NAA+PROBE: SIGNIFICANT CHANGE UP
SODIUM SERPL-SCNC: 137 MMOL/L — SIGNIFICANT CHANGE UP (ref 135–145)
WBC # BLD: 10.05 K/UL — SIGNIFICANT CHANGE UP (ref 3.8–10.5)
WBC # FLD AUTO: 10.05 K/UL — SIGNIFICANT CHANGE UP (ref 3.8–10.5)

## 2020-09-16 PROCEDURE — 20610 DRAIN/INJ JOINT/BURSA W/O US: CPT | Mod: 58,RT

## 2020-09-16 PROCEDURE — 99223 1ST HOSP IP/OBS HIGH 75: CPT

## 2020-09-16 PROCEDURE — 93010 ELECTROCARDIOGRAM REPORT: CPT

## 2020-09-16 PROCEDURE — 99284 EMERGENCY DEPT VISIT MOD MDM: CPT

## 2020-09-16 PROCEDURE — 99024 POSTOP FOLLOW-UP VISIT: CPT

## 2020-09-16 RX ORDER — ONDANSETRON 8 MG/1
4 TABLET, FILM COATED ORAL EVERY 6 HOURS
Refills: 0 | Status: DISCONTINUED | OUTPATIENT
Start: 2020-09-16 | End: 2020-09-17

## 2020-09-16 RX ORDER — OXYCODONE HYDROCHLORIDE 5 MG/1
5 TABLET ORAL
Refills: 0 | Status: DISCONTINUED | OUTPATIENT
Start: 2020-09-16 | End: 2020-09-17

## 2020-09-16 RX ORDER — CEFAZOLIN SODIUM 1 G
2000 VIAL (EA) INJECTION EVERY 8 HOURS
Refills: 0 | Status: DISCONTINUED | OUTPATIENT
Start: 2020-09-16 | End: 2020-09-17

## 2020-09-16 RX ORDER — ACETAMINOPHEN 500 MG
650 TABLET ORAL EVERY 6 HOURS
Refills: 0 | Status: DISCONTINUED | OUTPATIENT
Start: 2020-09-16 | End: 2020-09-17

## 2020-09-16 RX ORDER — SODIUM CHLORIDE 9 MG/ML
1000 INJECTION, SOLUTION INTRAVENOUS
Refills: 0 | Status: DISCONTINUED | OUTPATIENT
Start: 2020-09-16 | End: 2020-09-17

## 2020-09-16 RX ORDER — VANCOMYCIN HCL 1 G
1000 VIAL (EA) INTRAVENOUS ONCE
Refills: 0 | Status: COMPLETED | OUTPATIENT
Start: 2020-09-16 | End: 2020-09-16

## 2020-09-16 RX ORDER — OXYCODONE HYDROCHLORIDE 5 MG/1
10 TABLET ORAL
Refills: 0 | Status: DISCONTINUED | OUTPATIENT
Start: 2020-09-16 | End: 2020-09-17

## 2020-09-16 RX ORDER — VANCOMYCIN HCL 1 G
1000 VIAL (EA) INTRAVENOUS EVERY 12 HOURS
Refills: 0 | Status: DISCONTINUED | OUTPATIENT
Start: 2020-09-17 | End: 2020-09-17

## 2020-09-16 RX ORDER — SENNA PLUS 8.6 MG/1
2 TABLET ORAL AT BEDTIME
Refills: 0 | Status: DISCONTINUED | OUTPATIENT
Start: 2020-09-16 | End: 2020-09-17

## 2020-09-16 RX ORDER — HYDROMORPHONE HYDROCHLORIDE 2 MG/ML
0.5 INJECTION INTRAMUSCULAR; INTRAVENOUS; SUBCUTANEOUS EVERY 4 HOURS
Refills: 0 | Status: DISCONTINUED | OUTPATIENT
Start: 2020-09-16 | End: 2020-09-17

## 2020-09-16 RX ORDER — CEFAZOLIN SODIUM 1 G
VIAL (EA) INJECTION
Refills: 0 | Status: DISCONTINUED | OUTPATIENT
Start: 2020-09-16 | End: 2020-09-17

## 2020-09-16 RX ORDER — CEFAZOLIN SODIUM 1 G
2000 VIAL (EA) INJECTION ONCE
Refills: 0 | Status: COMPLETED | OUTPATIENT
Start: 2020-09-16 | End: 2020-09-16

## 2020-09-16 RX ORDER — VANCOMYCIN HCL 1 G
VIAL (EA) INTRAVENOUS
Refills: 0 | Status: DISCONTINUED | OUTPATIENT
Start: 2020-09-16 | End: 2020-09-17

## 2020-09-16 RX ADMIN — Medication 250 MILLIGRAM(S): at 19:02

## 2020-09-16 RX ADMIN — OXYCODONE HYDROCHLORIDE 10 MILLIGRAM(S): 5 TABLET ORAL at 21:41

## 2020-09-16 RX ADMIN — OXYCODONE HYDROCHLORIDE 10 MILLIGRAM(S): 5 TABLET ORAL at 18:28

## 2020-09-16 RX ADMIN — OXYCODONE HYDROCHLORIDE 10 MILLIGRAM(S): 5 TABLET ORAL at 18:58

## 2020-09-16 RX ADMIN — Medication 100 MILLIGRAM(S): at 18:31

## 2020-09-16 NOTE — HISTORY OF PRESENT ILLNESS
[Procedure: ___] : status post [unfilled] [Xray (Date:___)] : [unfilled] Xray -  [Erythema] : erythematous [Swelling] : swollen [Discharge] : noted to have a ~M discharge [Neuro Intact] : an unremarkable neurological exam [Dehiscence] : dehisced [Vascular Intact] : ~T peripheral vascular exam normal [Negative Ela's] : maneuvers demonstrated a negative Ela's sign [Bony Fusion] : bony fusion [Callus Formation] : callus formation [Hardware in Good Position] : hardware in good position [No Obvious Fractures] : no obvious fractures [Good Overall Alignment] : good overall alignment [Regressing] : is regressing [Signs of Infection] : is showing signs of an infection [Wound Drained & Cultures Sent] : wound drained and cultures sent [Chills] : no chills [Constipation] : no constipation [Diarrhea] : no diarrhea [Dysuria] : no dysuria [Fever] : no fever [Nausea] : no nausea [Vomiting] : no vomiting [de-identified] : S/P Right TKR, DOS: 8/25/20. [de-identified] : Pt is 3 weeks post-op. He wore pants for two days and noticed that his incision got irritated. He stopped wearing pants. Additionally, he noticed swelling and discharge from his knee that started yesterday. He is taking two baby Aspirins a day, his Plavix, and the amoxicillin [de-identified] : Right knee exam shows swelling with discharge. He has signs of superficial infection.  It is unclear whether there is a deep periprosthetic infection but there is some fluid under the skin consistent with an infected superficial hematoma at least [de-identified] : 3V xray of the right knee done in the office today and reviewed by Dr. Mtaeusz Qureshi demonstrates s/p implants in good positioning with no evidence of wear, loosening, or subsidence.  [de-identified] : We expressed to the patient that he has an infection in his right knee. We aspirated the right knee today and sent a sample to test cell count, crystals, and culture. He understands that he may have an infection under the skin or an infection in the implant. He may require a wash out or a revision surgery.  We did discuss the possible need for a two-stage revision if we cannot control the infection with polyethylene exchange and IV antibiotics the pt will go to the hospital to get IV antibiotics.  I will reevaluate him based on his initial response for the need for irrigation debridement of his incision versus polyethylene exchange [de-identified] : I aspirated 20 cc's of bloody synovial fluid from the patients right knee, no obvious sign of purulence

## 2020-09-16 NOTE — PROGRESS NOTE ADULT - SUBJECTIVE AND OBJECTIVE BOX
Anesthesia Review    60y Male    No Known Allergies      Unspecified complication of procedure, initial encounter    FHx: stroke (Mother)    FH: CABG (coronary artery bypass surgery) (Father)    Family history of heart attack (Father)    MEWS Score    Osteoarthritis of right knee    H/O heart artery stent    Hyperlipidemia    HTN (Hypertension)    CAD (Coronary Artery Disease)    Abnormal surgical wound, initial encounter    H/O hernia repair    Fracture, Femur    History of Back Surgery    s/p Angioplasty with Stent    POST SURGERY ISSUES RTE KNEE    20    [email or web address]        HPI: 60 year old with h/o CAD s/p TKR now scheduled for irrigation and debridement      PAST MEDICAL & SURGICAL HISTORY:  Osteoarthritis of right knee    H/O heart artery stent    Hyperlipidemia    CAD (Coronary Artery Disease)    H/O hernia repair    Fracture, Femur  right    History of Back Surgery  fusion    s/p Angioplasty with Stent        MEDICATIONS  (STANDING):    MEDICATIONS  (PRN):      Allergies    No Known Allergies    Intolerances        SOCIAL HISTORY:    FAMILY HISTORY:  FHx: stroke (Mother)  mother    FH: CABG (coronary artery bypass surgery) (Father)  father    Family history of heart attack (Father)  father, dies at 59 yo        Vital Signs Last 24 Hrs  T(C): 37.2 (16 Sep 2020 15:28), Max: 37.2 (16 Sep 2020 15:28)  T(F): 99 (16 Sep 2020 15:28), Max: 99 (16 Sep 2020 15:28)  HR: 92 (16 Sep 2020 15:28) (92 - 92)  BP: 130/73 (16 Sep 2020 15:28) (130/73 - 130/73)  BP(mean): --  RR: 18 (16 Sep 2020 15:28) (18 - 18)  SpO2: 99% (16 Sep 2020 15:28) (99% - 99%)          LABS:                        11.1   10.05 )-----------( 336      ( 16 Sep 2020 16:29 )             33.4           PT/INR - ( 16 Sep 2020 16:29 )   PT: 13.8 sec;   INR: 1.20 ratio               RADIOLOGY & ADDITIONAL STUDIES:    Patient is an ASA class  3    perioperative anticoagulation management per ortho/cardiology    GA    final anesthetic plan per anesthesia attending on day of surgery

## 2020-09-16 NOTE — H&P ADULT - HISTORY OF PRESENT ILLNESS
Patient familiar to our service presents to the ER for evaluation of right knee wound. Patient had Right total knee arthroplasty on 8/25/20. Patient states he was feeling better and was back to work and doing well. Noticed a few days ago his knee was causing him some discomfort and looked irritated. He states he just started wearing jeans and they seem to be rubbing against his knee. He states he noticed redness and swelling at the center of the incision. He said he kept trying to "squeeze it like a pimple" and tried to get any drainage out. He went to Dr Qureshi office today where he had his knee aspirated by Dr Qureshi. States he has not checked for any fever nor has felt symptomatic

## 2020-09-16 NOTE — ED PROVIDER NOTE - FAMILY HISTORY
Mother  Still living? Unknown  FHx: stroke, Age at diagnosis: Age Unknown     Father  Still living? Unknown  Family history of heart attack, Age at diagnosis: Age Unknown  FH: CABG (coronary artery bypass surgery), Age at diagnosis: Age Unknown

## 2020-09-16 NOTE — ED PROVIDER NOTE - PHYSICAL EXAMINATION
Right knee   Positive erythema to knee, ROM with pain   positive small amount of discharge to distal incision

## 2020-09-16 NOTE — ED PROVIDER NOTE - ATTENDING CONTRIBUTION TO CARE
Dominique: I performed a face to face bedside interview with patient regarding history of present illness, review of symptoms and past medical history. I completed an independent physical exam.  I have discussed patient's plan of care with advanced care provider.   I agree with note as stated above including HISTORY OF PRESENT ILLNESS, HIV, PAST MEDICAL/SURGICAL/FAMILY/SOCIAL HISTORY, ALLERGIES AND HOME MEDICATIONS, REVIEW OF SYSTEMS, PHYSICAL EXAM, MEDICAL DECISION MAKING and any PROGRESS NOTES during the time I functioned as the attending physician for this patient  unless otherwise noted. My brief assessment is as follows: Patient s/p TKR with Dr Qureshi p/w redness and pain to R knee x 2 days. S/p aspiration in office by Dr Qureshi. Sent in for OR tomorrow. Denies fever, DROM. +purulent drainage.   Plan for labs, admit.

## 2020-09-16 NOTE — CONSULT NOTE ADULT - SUBJECTIVE AND OBJECTIVE BOX
[facility]  INFECTIOUS DISEASES AND INTERNAL MEDICINE at [place]  =======================================================  Alan Diaz MD  Diplomates American Board of Internal Medicine and Infectious Diseases  Tel: [phone]      Fax: [phone]  =======================================================      [id number]  SHA GUTIERREZ    CC: Patient is a 60y old  Male who presents with a chief complaint of Rt knee infection/cellulitis     60y  Male  with h/o CAD s/p Stents, HLD, recent Right total knee arthroplasty on 20. Patient sent to the [facility] ER for possible Rt knee septic arthritis. Patient had redness over surgical site and some discomfort. Denies fever or chills. Reported minimal difficulty walking. In the office synovial fluid was sampled and he was sent to the ER. In the ER patient is afebrile, no leukocytosis. Planned for OR tomorrow. ID input requested.       Past Medical & Surgical Hx:  Osteoarthritis of right knee  H/O heart artery stent  Hyperlipidemia  CAD (Coronary Artery Disease)  H/O hernia repair  Fracture, Femur  right  History of Back Surgery  fusion  s/p Angioplasty with Stent      Social Hx:  Denies smoking, ETOH       FAMILY HISTORY:  FHx: stroke (Mother)  mother  FH: CABG (coronary artery bypass surgery) (Father)  Family history of heart attack (Father)  father,  at 59 yo    Allergies  No Known Allergies       REVIEW OF SYSTEMS:  CONSTITUTIONAL:  No Fever or chills  HEENT:  No diplopia or blurred vision.  No earache, sore throat or runny nose.  CARDIOVASCULAR:  No pressure, squeezing, strangling, tightness, heaviness or aching about the chest, neck, axilla or epigastrium.  RESPIRATORY:  No cough, shortness of breath  GASTROINTESTINAL:  No nausea, vomiting or diarrhea.  GENITOURINARY:  No dysuria, frequency or urgency.   MUSCULOSKELETAL:   no muscle pain. Rt knee tenderness   SKIN:  Rt knee redness  NEUROLOGIC:  No Headaches, seizures   PSYCHIATRIC:  No disorder of thought or mood.  ENDOCRINE:  No heat or cold intolerance  HEMATOLOGICAL:  No easy bruising or bleeding.       Physical Exam:  GEN: NAD, pleasant  HEENT: normocephalic and atraumatic. EOMI. PERRL.  Anicteric  NECK: Supple.   LUNGS: Clear to auscultation.  HEART: Regular rate and rhythm  ABDOMEN: Soft, nontender, and nondistended.  Positive bowel sounds.    : No CVA tenderness  EXTREMITIES: Without any edema.  MSK: Rt Knee swelling and redness   NEUROLOGIC: No Focal Deficits  PSYCHIATRIC: Appropriate affect .  SKIN: No Rash    Height (cm): 175.3 ( @ 15:28)  Weight (kg): 95.3 ( @ 15:28)  BMI (kg/m2): 31 ( @ 15:28)  BSA (m2): 2.11 ( @ 15:28)      Vitals:  T(F): 99 (16 Sep 2020 15:28), Max: 99 (16 Sep 2020 15:28)  HR: 92 (16 Sep 2020 15:28)  BP: 130/73 (16 Sep 2020 15:28)  RR: 18 (16 Sep 2020 15:28)  SpO2: 99% (16 Sep 2020 15:28) (99% - 99%)  temp max in last 48H T(F): , Max: 99 (20 @ 15:28)      Current Antibiotics:      Other medications:      Labs:                        11.1   10.05 )-----------( 336      ( 16 Sep 2020 16:29 )             33.4      WBC Count: 10.05 K/uL (20 @ 16:29)          < from: Xray Knee 1 or 2 Views, Right (20 @ 11:10) >  EXAM:  KNEE-RIGHT                          PROCEDURE DATE:  2020      INTERPRETATION:  HISTORY: Postoperative  knee replacement.    Two views of the RIGHT knee are submitted.    Evaluation demonstrates the presence of a tricompartmental knee replacement with the femoral, tibial and patellar components in proper anatomic alignment. There is no fracture .  Impression:  Knee prosthetic components in proper anatomical alignment.    < end of copied text >

## 2020-09-16 NOTE — ED PROVIDER NOTE - PMH
CAD (Coronary Artery Disease)    H/O heart artery stent    Hyperlipidemia    Osteoarthritis of right knee

## 2020-09-16 NOTE — CONSULT NOTE ADULT - ASSESSMENT
60y  Male  with h/o CAD s/p Stents, HLD, recent Right total knee arthroplasty on 8/25/20. Patient sent to the [facility] ER for possible Rt knee septic arthritis. Patient had redness over surgical site and some discomfort. Denies fever or chills. Reported minimal difficulty walking. In the office synovial fluid was sampled and he was sent to the ER. In the ER patient is afebrile, no leukocytosis.      r/o Septic joint infection   Rt knee incision site redness  s/p Right total knee arthroplasty on 8/25/20  CAD      - Blood cultures ordered and pending  - COVID 19 PCR pending  - XR - rt knee from 8/25 reviewed   - ESR pending  - CRP pending  - Synovial fluid analysis pending  - Synovial fluid culture pending  - Hold off on antibiotics pending culture results given no fever or leukocytosis   - Trend Fever  - Trend Leukocytosis      Will Follow

## 2020-09-16 NOTE — CONSULT NOTE ADULT - SUBJECTIVE AND OBJECTIVE BOX
chief complaint of Rt knee pain, swelling, discharge     HPI:  58 yo M Hx of HLD, CAD s/p PCI stents x9,last one 3 yrs ago , Hx hiatal hernia  with recent Rt knee replacement in 08/2020 presented today to Dr Qureshi's office with c.o rt knee pain, swelling, redness and discharge from the wound. He underwent arthrocentesis and has been admitted for further care. He is being planned for Rt knee Washout in OR in am , we were consulted for pre-op risk stratification, He denies any chest pain, sob, cough, fever. He has been very active before his Surgery, was able to bike for miles , able to work out without any chest pain , sob, palpitations, non-smoker, was seen by Dr Wong his Cardiologist in early Aug and Nuclear stress test was negative for ischemia per pt.       PAST MEDICAL & SURGICAL HISTORY:  Osteoarthritis of right knee    H/O heart artery stent    Hyperlipidemia    CAD (Coronary Artery Disease)    H/O hernia repair    Fracture, Femur  right    History of Back Surgery  fusion    s/p Angioplasty with Stent        Social History:  Tabacco - denies  ETOH - social - last drink 3days ago   Illicit drug abuse - denies    FAMILY HISTORY:  FHx: stroke (Mother)  mother    FH: CABG (coronary artery bypass surgery) (Father)  father    Family history of heart attack (Father)  father, dies at 59 yo        Allergies    No Known Allergies    Intolerances        HOME MEDICATIONS :   Home Medications:  aspirin 81 mg oral delayed release tablet: 1 tab(s)  once daily   Plavix 75 mg oral tablet: 1 tab(s) orally once a day       REVIEW OF SYSTEMS:    CONSTITUTIONAL: No fever,  fatigue  EYES: No eye pain, visual disturbances, or discharge  NECK: No pain or stiffness  RESPIRATORY: No cough, wheezing, hemoptysis; No shortness of breath  CARDIOVASCULAR: No chest pain, palpitations, dizziness, or leg swelling  GASTROINTESTINAL: No abdominal or epigastric pain. No nausea, vomiting,  No diarrhea or constipation. No melena or hematochezia.  GENITOURINARY: No dysuria, frequency, hematuria, or incontinence  NEUROLOGICAL: No headaches, memory loss, loss of strength, numbness, or tremors  SKIN: No itching, burning, rashes, or lesions   ENDOCRINE: No heat or cold intolerance; No hair loss  MUSCULOSKELETAL:   PSYCHIATRIC: No depression, anxiety, mood swings, or difficulty sleeping      MEDICATIONS  (STANDING):  ceFAZolin   IVPB      vancomycin  IVPB        MEDICATIONS  (PRN):      Vital Signs Last 24 Hrs  T(C): 37.2 (16 Sep 2020 15:28), Max: 37.2 (16 Sep 2020 15:28)  T(F): 99 (16 Sep 2020 15:28), Max: 99 (16 Sep 2020 15:28)  HR: 92 (16 Sep 2020 15:28) (92 - 92)  BP: 130/73 (16 Sep 2020 15:28) (130/73 - 130/73)  BP(mean): --  RR: 18 (16 Sep 2020 15:28) (18 - 18)  SpO2: 99% (16 Sep 2020 15:28) (99% - 99%)    PHYSICAL EXAM:    GENERAL: NAD, well-groomed, well-developed , tattoos on Lt arm noted  HEAD:  Atraumatic, Normocephalic  EYES: EOMI, PERRLA, conjunctiva and sclera clear  NECK: Supple, No JVD  NERVOUS SYSTEM:  Alert & Oriented X3, Good concentration  CHEST/LUNG: CTA  b/l,  no rales, rhonchi  HEART: Regular rate and rhythm; No murmurs  EXTREMITIES:   No clubbing, cyanosis, or edema . Rt knee Redness, swelling, serous discharge through surgical wound+ no calf tenderness b/l   SKIN: No rashes or lesions    LABS:                        11.1   10.05 )-----------( 336      ( 16 Sep 2020 16:29 )             33.4     09-16    137  |  100  |  17.0  ----------------------------<  97  3.9   |  25.0  |  1.13    Ca    9.1      16 Sep 2020 16:29    TPro  6.7  /  Alb  4.4  /  TBili  0.3<L>  /  DBili  x   /  AST  44<H>  /  ALT  27  /  AlkPhos  120  09-16    PT/INR - ( 16 Sep 2020 16:29 )   PT: 13.8 sec;   INR: 1.20 ratio             RADIOLOGY & ADDITIONAL STUDIES:

## 2020-09-16 NOTE — H&P ADULT - NSICDXFAMILYHX_GEN_ALL_CORE_FT
FAMILY HISTORY:  Father  Still living? Unknown  Family history of heart attack, Age at diagnosis: Age Unknown  FH: CABG (coronary artery bypass surgery), Age at diagnosis: Age Unknown    Mother  Still living? Unknown  FHx: stroke, Age at diagnosis: Age Unknown

## 2020-09-16 NOTE — END OF VISIT
[FreeTextEntry3] : I, Sam Hawthorne, acted solely as a scribe for Dr. Mateusz Qureshi on this date 09/16/2020.

## 2020-09-16 NOTE — ED PROVIDER NOTE - PSH
Fracture, Femur  right  H/O hernia repair    History of Back Surgery  fusion  s/p Angioplasty with Stent

## 2020-09-16 NOTE — H&P ADULT - NSICDXPASTSURGICALHX_GEN_ALL_CORE_FT
PAST SURGICAL HISTORY:  Fracture, Femur right    H/O hernia repair     H/O total knee replacement, right     History of Back Surgery fusion    s/p Angioplasty with Stent

## 2020-09-16 NOTE — ED PROVIDER NOTE - OBJECTIVE STATEMENT
Patient is a 60 year old male who was sent in from Dr. Qureshi's office. patient is s/p total knee arthroplasty on by Dr. Qureshi  patient had knee aspirated in office today and was sent in for operative I&D tomorrow by Dr. Qureshi Patient is a 60 year old male who was sent in from Dr. Qureshi's office. patient is s/p right total knee arthroplasty on by Dr. Qureshi on 8/25/20  patient had knee aspirated in office today and was sent in for operative I&D tomorrow by Dr. Qureshi

## 2020-09-16 NOTE — CONSULT NOTE ADULT - ASSESSMENT
60 yo M Hx of HLD, CAD s/p PCI stents x9,last one 3 yrs ago , Hx hiatal hernia  with recent Rt knee replacement in 08/2020 presented today to Dr Qureshi's office with c.o rt knee pain, swelling, redness and discharge from the wound. He underwent arthrocentesis and has been admitted for further care. He is being planned for Rt knee Washout in OR in am , we were consulted for pre-op risk stratification, He denies any chest pain, sob, cough, fever. He has been very active before his Surgery, was able to bike for miles , able to work out without any chest pain , sob, palpitations, non-smoker, was seen by Dr Wong his Cardiologist in early Aug and Nuclear stress test was negative for ischemia per pt.     # Rt prosthetic knee infection - f/u synovial fluid cultures, iv antibiotics empiric - Per ID recs , OR washout in am , OR cultures   There is no absolute contraindication for the said procedure or surgery.  benefits outweigh risks. Will get EKG - if normal, may proceed to OR     # CAD s/p stents - on asa, plavix   # HPL - not on statins     Will follow

## 2020-09-16 NOTE — H&P ADULT - NSHPPHYSICALEXAM_GEN_ALL_CORE
Right knee anterior erythema and mild swelling noted. Around incision wound appears indurated and cellulitic. Middle of wound/incision there is minimal amount of drainage expressed with pressure. I swabbed the drainage and sent to lab. Laterally there is a bandaid from his previous aspiration from Dr Qureshi office this afternoon. Mild edema noted of lower leg. ROM 0-90. Calf mild edema yet remains soft and compressible. Sensation intact

## 2020-09-17 ENCOUNTER — TRANSCRIPTION ENCOUNTER (OUTPATIENT)
Age: 60
End: 2020-09-17

## 2020-09-17 LAB
B PERT IGG+IGM PNL SER: ABNORMAL
COLOR FLD: ABNORMAL
FLUID INTAKE SUBSTANCE CLASS: SIGNIFICANT CHANGE UP
FLUID SEGMENTED GRANULOCYTES: 84 % — SIGNIFICANT CHANGE UP
LYMPHOCYTES # FLD: 12 % — SIGNIFICANT CHANGE UP
MONOS+MACROS # FLD: 4 % — SIGNIFICANT CHANGE UP
RCV VOL RI: HIGH /UL (ref 0–0)
SARS-COV-2 IGG SERPL QL IA: NEGATIVE — SIGNIFICANT CHANGE UP
SARS-COV-2 IGM SERPL IA-ACNC: 0.09 INDEX — SIGNIFICANT CHANGE UP
SPECIMEN SOURCE FLD: SIGNIFICANT CHANGE UP
TOTAL NUCLEATED CELL COUNT, BODY FLUID: 879 /UL — SIGNIFICANT CHANGE UP
TUBE TYPE: SIGNIFICANT CHANGE UP

## 2020-09-17 PROCEDURE — 27301 DRAIN THIGH/KNEE LESION: CPT | Mod: 78,RT

## 2020-09-17 PROCEDURE — 99232 SBSQ HOSP IP/OBS MODERATE 35: CPT

## 2020-09-17 PROCEDURE — ZZZZZ: CPT

## 2020-09-17 RX ORDER — POLYETHYLENE GLYCOL 3350 17 G/17G
17 POWDER, FOR SOLUTION ORAL DAILY
Refills: 0 | Status: DISCONTINUED | OUTPATIENT
Start: 2020-09-17 | End: 2020-09-21

## 2020-09-17 RX ORDER — SODIUM CHLORIDE 9 MG/ML
1000 INJECTION, SOLUTION INTRAVENOUS
Refills: 0 | Status: DISCONTINUED | OUTPATIENT
Start: 2020-09-17 | End: 2020-09-17

## 2020-09-17 RX ORDER — ASPIRIN/CALCIUM CARB/MAGNESIUM 324 MG
81 TABLET ORAL
Refills: 0 | Status: DISCONTINUED | OUTPATIENT
Start: 2020-09-18 | End: 2020-09-21

## 2020-09-17 RX ORDER — SODIUM CHLORIDE 9 MG/ML
1000 INJECTION, SOLUTION INTRAVENOUS
Refills: 0 | Status: DISCONTINUED | OUTPATIENT
Start: 2020-09-17 | End: 2020-09-21

## 2020-09-17 RX ORDER — OXYCODONE HYDROCHLORIDE 5 MG/1
5 TABLET ORAL
Refills: 0 | Status: DISCONTINUED | OUTPATIENT
Start: 2020-09-17 | End: 2020-09-21

## 2020-09-17 RX ORDER — MAGNESIUM HYDROXIDE 400 MG/1
30 TABLET, CHEWABLE ORAL DAILY
Refills: 0 | Status: DISCONTINUED | OUTPATIENT
Start: 2020-09-17 | End: 2020-09-21

## 2020-09-17 RX ORDER — APREPITANT 80 MG/1
40 CAPSULE ORAL ONCE
Refills: 0 | Status: COMPLETED | OUTPATIENT
Start: 2020-09-17 | End: 2020-09-17

## 2020-09-17 RX ORDER — PANTOPRAZOLE SODIUM 20 MG/1
40 TABLET, DELAYED RELEASE ORAL
Refills: 0 | Status: DISCONTINUED | OUTPATIENT
Start: 2020-09-17 | End: 2020-09-21

## 2020-09-17 RX ORDER — ONDANSETRON 8 MG/1
4 TABLET, FILM COATED ORAL ONCE
Refills: 0 | Status: COMPLETED | OUTPATIENT
Start: 2020-09-17 | End: 2020-09-17

## 2020-09-17 RX ORDER — CEFAZOLIN SODIUM 1 G
2000 VIAL (EA) INJECTION EVERY 8 HOURS
Refills: 0 | Status: DISCONTINUED | OUTPATIENT
Start: 2020-09-17 | End: 2020-09-21

## 2020-09-17 RX ORDER — KETOROLAC TROMETHAMINE 30 MG/ML
15 SYRINGE (ML) INJECTION EVERY 6 HOURS
Refills: 0 | Status: DISCONTINUED | OUTPATIENT
Start: 2020-09-17 | End: 2020-09-18

## 2020-09-17 RX ORDER — TRANEXAMIC ACID 100 MG/ML
1000 INJECTION, SOLUTION INTRAVENOUS ONCE
Refills: 0 | Status: DISCONTINUED | OUTPATIENT
Start: 2020-09-17 | End: 2020-09-17

## 2020-09-17 RX ORDER — CLOPIDOGREL BISULFATE 75 MG/1
75 TABLET, FILM COATED ORAL DAILY
Refills: 0 | Status: DISCONTINUED | OUTPATIENT
Start: 2020-09-18 | End: 2020-09-21

## 2020-09-17 RX ORDER — VANCOMYCIN HCL 1 G
1000 VIAL (EA) INTRAVENOUS EVERY 12 HOURS
Refills: 0 | Status: DISCONTINUED | OUTPATIENT
Start: 2020-09-18 | End: 2020-09-20

## 2020-09-17 RX ORDER — SENNA PLUS 8.6 MG/1
2 TABLET ORAL AT BEDTIME
Refills: 0 | Status: DISCONTINUED | OUTPATIENT
Start: 2020-09-17 | End: 2020-09-19

## 2020-09-17 RX ORDER — OXYCODONE HYDROCHLORIDE 5 MG/1
10 TABLET ORAL
Refills: 0 | Status: DISCONTINUED | OUTPATIENT
Start: 2020-09-17 | End: 2020-09-21

## 2020-09-17 RX ORDER — HYDROMORPHONE HYDROCHLORIDE 2 MG/ML
0.5 INJECTION INTRAMUSCULAR; INTRAVENOUS; SUBCUTANEOUS EVERY 4 HOURS
Refills: 0 | Status: DISCONTINUED | OUTPATIENT
Start: 2020-09-17 | End: 2020-09-17

## 2020-09-17 RX ORDER — ACETAMINOPHEN 500 MG
975 TABLET ORAL EVERY 8 HOURS
Refills: 0 | Status: DISCONTINUED | OUTPATIENT
Start: 2020-09-17 | End: 2020-09-21

## 2020-09-17 RX ORDER — FENTANYL CITRATE 50 UG/ML
50 INJECTION INTRAVENOUS
Refills: 0 | Status: DISCONTINUED | OUTPATIENT
Start: 2020-09-17 | End: 2020-09-17

## 2020-09-17 RX ORDER — ONDANSETRON 8 MG/1
4 TABLET, FILM COATED ORAL EVERY 6 HOURS
Refills: 0 | Status: DISCONTINUED | OUTPATIENT
Start: 2020-09-17 | End: 2020-09-21

## 2020-09-17 RX ORDER — HYDROMORPHONE HYDROCHLORIDE 2 MG/ML
4 INJECTION INTRAMUSCULAR; INTRAVENOUS; SUBCUTANEOUS
Refills: 0 | Status: DISCONTINUED | OUTPATIENT
Start: 2020-09-17 | End: 2020-09-21

## 2020-09-17 RX ORDER — HYDROMORPHONE HYDROCHLORIDE 2 MG/ML
0.5 INJECTION INTRAMUSCULAR; INTRAVENOUS; SUBCUTANEOUS
Refills: 0 | Status: DISCONTINUED | OUTPATIENT
Start: 2020-09-17 | End: 2020-09-21

## 2020-09-17 RX ADMIN — FENTANYL CITRATE 50 MICROGRAM(S): 50 INJECTION INTRAVENOUS at 18:39

## 2020-09-17 RX ADMIN — ONDANSETRON 4 MILLIGRAM(S): 8 TABLET, FILM COATED ORAL at 17:55

## 2020-09-17 RX ADMIN — OXYCODONE HYDROCHLORIDE 10 MILLIGRAM(S): 5 TABLET ORAL at 06:11

## 2020-09-17 RX ADMIN — Medication 100 MILLIGRAM(S): at 02:58

## 2020-09-17 RX ADMIN — HYDROMORPHONE HYDROCHLORIDE 4 MILLIGRAM(S): 2 INJECTION INTRAMUSCULAR; INTRAVENOUS; SUBCUTANEOUS at 21:25

## 2020-09-17 RX ADMIN — APREPITANT 40 MILLIGRAM(S): 80 CAPSULE ORAL at 15:02

## 2020-09-17 RX ADMIN — Medication 975 MILLIGRAM(S): at 23:32

## 2020-09-17 RX ADMIN — HYDROMORPHONE HYDROCHLORIDE 4 MILLIGRAM(S): 2 INJECTION INTRAMUSCULAR; INTRAVENOUS; SUBCUTANEOUS at 22:25

## 2020-09-17 RX ADMIN — FENTANYL CITRATE 50 MICROGRAM(S): 50 INJECTION INTRAVENOUS at 17:43

## 2020-09-17 RX ADMIN — Medication 15 MILLIGRAM(S): at 23:48

## 2020-09-17 RX ADMIN — Medication 975 MILLIGRAM(S): at 23:33

## 2020-09-17 RX ADMIN — FENTANYL CITRATE 50 MICROGRAM(S): 50 INJECTION INTRAVENOUS at 18:04

## 2020-09-17 RX ADMIN — OXYCODONE HYDROCHLORIDE 10 MILLIGRAM(S): 5 TABLET ORAL at 10:30

## 2020-09-17 RX ADMIN — Medication 250 MILLIGRAM(S): at 06:05

## 2020-09-17 RX ADMIN — OXYCODONE HYDROCHLORIDE 10 MILLIGRAM(S): 5 TABLET ORAL at 07:00

## 2020-09-17 RX ADMIN — Medication 15 MILLIGRAM(S): at 23:33

## 2020-09-17 RX ADMIN — Medication 100 MILLIGRAM(S): at 19:21

## 2020-09-17 RX ADMIN — OXYCODONE HYDROCHLORIDE 10 MILLIGRAM(S): 5 TABLET ORAL at 09:50

## 2020-09-17 RX ADMIN — ONDANSETRON 4 MILLIGRAM(S): 8 TABLET, FILM COATED ORAL at 20:59

## 2020-09-17 RX ADMIN — Medication 100 MILLIGRAM(S): at 12:56

## 2020-09-17 NOTE — DISCHARGE NOTE PROVIDER - NSDCCPTREATMENT_GEN_ALL_CORE_FT
PRINCIPAL PROCEDURE  Procedure: Revision of operative wound of anterior segment, not elsewhere classified  Findings and Treatment:

## 2020-09-17 NOTE — DISCHARGE NOTE PROVIDER - CARE PROVIDER_API CALL
Mateusz Qureshi  ORTHOPAEDIC SURGERY  [address]  [place] [address]  Phone: [phone]  Fax: [phone]  Follow Up Time:    Mateusz Qureshi  ORTHOPAEDIC SURGERY  [address]  [place] [address]  Phone: [phone]  Fax: [phone]  Follow Up Time:     Osman Ritchie)  Infectious Disease; Internal Medicine  [address]  [place] [address]  Phone: [phone]  Fax: [phone]  Follow Up Time:

## 2020-09-17 NOTE — DISCHARGE NOTE PROVIDER - CARE PROVIDERS DIRECT ADDRESSES
[email or web address]

## 2020-09-17 NOTE — PROGRESS NOTE ADULT - SUBJECTIVE AND OBJECTIVE BOX
Ortho Post Op Check    Name: SHA GUTIERREZ    MR #: [id number]    Procedure: Right total knee arthroplasty washout  Surgeon: Kiera    Pt comfortable without complaints, pain controlled. KI in place  Denies CP, SOB, N/V, numbness/tingling     General Exam:  Vital Signs Last 24 Hrs  T(C): 36.7 (09-17-20 @ 21:23), Max: 36.8 (09-17-20 @ 17:41)  T(F): 98.1 (09-17-20 @ 21:23), Max: 98.2 (09-17-20 @ 17:41)  HR: 62 (09-17-20 @ 21:23) (55 - 67)  BP: 121/82 (09-17-20 @ 21:23) (99/52 - 124/59)  BP(mean): --  RR: 18 (09-17-20 @ 21:23) (9 - 18)  SpO2: 93% (09-17-20 @ 21:23) (93% - 100%)    General: Pt Alert and oriented, NAD, controlled pain.  +KI to right leg. +Prevena dressing in place. Dressings C/D/I. No bleeding.  Pulses: 2+ dorsalis pedis pulse. Cap refill < 2 sec.  Sensation: Grossly intact to light touch without deficit.  Motor: + EHL/FHL/TA/GS                          11.1   10.05 )-----------( 336      ( 16 Sep 2020 16:29 )             33.4   16 Sep 2020 16:29    137    |  100    |  17.0   ----------------------------<  97     3.9     |  25.0   |  1.13       TPro  6.7    /  Alb  4.4    /  TBili  0.3    /  DBili  x      /  AST  44     /  ALT  27     /  AlkPhos  120    16 Sep 2020 16:29      Post-op X-Ray:    A/P: 60yMale POD#0 s/p right total knee arthroplasty washout    - Pain Control  - continue Prevena  - DVT ppx: Aspirin 81 BID and Plavix QD  - IV ABX as per ID  - PT eval pending  - Weight bearing status: WBAT with KI at all times Ortho Post Op Check    Name: SHA GUTIERREZ    MR #: [id number]    Procedure: Right total knee arthroplasty washout  Surgeon: Kiera    Pt comfortable, pain controlled. Patient complaining of some nausea, states hasn't eaten yet. KI in place  Denies CP, SOB, Vomiting, numbness/tingling     General Exam:  Vital Signs Last 24 Hrs  T(C): 36.7 (09-17-20 @ 21:23), Max: 36.8 (09-17-20 @ 17:41)  T(F): 98.1 (09-17-20 @ 21:23), Max: 98.2 (09-17-20 @ 17:41)  HR: 62 (09-17-20 @ 21:23) (55 - 67)  BP: 121/82 (09-17-20 @ 21:23) (99/52 - 124/59)  BP(mean): --  RR: 18 (09-17-20 @ 21:23) (9 - 18)  SpO2: 93% (09-17-20 @ 21:23) (93% - 100%)    General: Pt Alert and oriented, NAD, controlled pain.  +KI to right leg. +Prevena dressing in place. Dressings C/D/I. No bleeding.  Pulses: 2+ dorsalis pedis pulse. Cap refill < 2 sec.  Sensation: Grossly intact to light touch without deficit.  Motor: + EHL/FHL/TA/GS                          11.1   10.05 )-----------( 336      ( 16 Sep 2020 16:29 )             33.4   16 Sep 2020 16:29    137    |  100    |  17.0   ----------------------------<  97     3.9     |  25.0   |  1.13       TPro  6.7    /  Alb  4.4    /  TBili  0.3    /  DBili  x      /  AST  44     /  ALT  27     /  AlkPhos  120    16 Sep 2020 16:29      Post-op X-Ray:    A/P: 60yMale POD#0 s/p right total knee arthroplasty washout    - Pain Control  - continue Prevena  - DVT ppx: Aspirin 81 BID and Plavix QD  - IV ABX as per ID  - PT eval pending  - Weight bearing status: WBAT with KI at all times

## 2020-09-17 NOTE — PROGRESS NOTE ADULT - SUBJECTIVE AND OBJECTIVE BOX
SHA GUTIERREZ    [id number]    60y      Male    CC: Rt knee pain - admitted with surgical wound infection s/p rt knee arthroplasty concern for PJI  scheduled for OR today for washout   no new complaints, awaiting OR     INTERVAL HPI/OVERNIGHT EVENTS: no acute events     REVIEW OF SYSTEMS:    CONSTITUTIONAL: No fever,  fatigue  RESPIRATORY: No cough, wheezing, No shortness of breath  CARDIOVASCULAR: No chest pain, palpitations  GASTROINTESTINAL: No abdominal or epigastric pain. No nausea, vomiting        Vital Signs Last 24 Hrs  T(C): 36.9 (17 Sep 2020 07:49), Max: 37.2 (16 Sep 2020 15:28)  T(F): 98.5 (17 Sep 2020 07:49), Max: 99 (16 Sep 2020 15:28)  HR: 69 (17 Sep 2020 07:49) (69 - 92)  BP: 104/73 (17 Sep 2020 07:49) (104/73 - 130/73)  BP(mean): --  RR: 18 (17 Sep 2020 07:49) (17 - 18)  SpO2: 99% (17 Sep 2020 07:49) (95% - 99%)    PHYSICAL EXAM:    GENERAL: NAD, well-groomed  HEENT: PERRL, +EOMI  NECK: soft, Supple  CHEST/LUNG: Clear to percussion bilaterally; No wheezing  HEART: S1S2+, Regular rate and rhythm; No murmur  ABDOMEN: Soft, Nontender, Nondistended; Bowel sounds present  EXTREMITIES:  No clubbing, cyanosis, or edema. Rt knee swelling, redness, bogginess over the surgical incision   SKIN: No rashes or lesions  NEURO: AAOX3    09-16 @ 07:01  -  09-17 @ 07:00  --------------------------------------------------------  IN: 500 mL / OUT: 0 mL / NET: 500 mL        LABS:                        11.1   10.05 )-----------( 336      ( 16 Sep 2020 16:29 )             33.4     09-16    137  |  100  |  17.0  ----------------------------<  97  3.9   |  25.0  |  1.13    Ca    9.1      16 Sep 2020 16:29    TPro  6.7  /  Alb  4.4  /  TBili  0.3<L>  /  DBili  x   /  AST  44<H>  /  ALT  27  /  AlkPhos  120  09-16    PT/INR - ( 16 Sep 2020 16:29 )   PT: 13.8 sec;   INR: 1.20 ratio                 MEDICATIONS  (STANDING):  ceFAZolin   IVPB      ceFAZolin   IVPB 2000 milliGRAM(s) IV Intermittent every 8 hours  influenza   Vaccine 0.5 milliLiter(s) IntraMuscular once  lactated ringers. 1000 milliLiter(s) (100 mL/Hr) IV Continuous <Continuous>  vancomycin  IVPB 1000 milliGRAM(s) IV Intermittent every 12 hours  vancomycin  IVPB        MEDICATIONS  (PRN):  acetaminophen   Tablet .. 650 milliGRAM(s) Oral every 6 hours PRN Mild Pain (1 - 3)  aluminum hydroxide/magnesium hydroxide/simethicone Suspension 30 milliLiter(s) Oral four times a day PRN Indigestion  HYDROmorphone  Injectable 0.5 milliGRAM(s) IV Push every 4 hours PRN breakthrough pain  ondansetron Injectable 4 milliGRAM(s) IV Push every 6 hours PRN Nausea and/or Vomiting  oxyCODONE    IR 5 milliGRAM(s) Oral every 3 hours PRN Mild Pain (1 - 3)  oxyCODONE    IR 10 milliGRAM(s) Oral every 3 hours PRN Moderate Pain (4 - 6)  senna 2 Tablet(s) Oral at bedtime PRN Constipation      RADIOLOGY & ADDITIONAL TESTS:    EKG - tracing reviewed - NSR - age - indeterminate inferior infarct.

## 2020-09-17 NOTE — PROGRESS NOTE ADULT - SUBJECTIVE AND OBJECTIVE BOX
[facility]  INFECTIOUS DISEASES AND INTERNAL MEDICINE at [place]  =======================================================  Alan Diaz MD  Diplomates American Board of Internal Medicine and Infectious Diseases  Tel: [phone]      Fax: [phone]  =======================================================    SHA GUTIERREZ [id number]    Follow up:      Allergies:  No Known Allergies      REVIEW OF SYSTEMS:  CONSTITUTIONAL:  No Fever or chills  HEENT:  No diplopia or blurred vision.  No earache, sore throat or runny nose.  CARDIOVASCULAR:  No pressure, squeezing, strangling, tightness, heaviness or aching about the chest, neck, axilla or epigastrium.  RESPIRATORY:  No cough, shortness of breath  GASTROINTESTINAL:  No nausea, vomiting or diarrhea.  GENITOURINARY:  No dysuria, frequency or urgency.   MUSCULOSKELETAL:   no muscle pain. Rt knee tenderness   SKIN:  Rt knee redness  NEUROLOGIC:  No Headaches, seizures   PSYCHIATRIC:  No disorder of thought or mood.  ENDOCRINE:  No heat or cold intolerance  HEMATOLOGICAL:  No easy bruising or bleeding.       Physical Exam:  GEN: NAD, pleasant  HEENT: normocephalic and atraumatic. EOMI. PERRL.  Anicteric  NECK: Supple.   LUNGS: Clear to auscultation.  HEART: Regular rate and rhythm  ABDOMEN: Soft, nontender, and nondistended.  Positive bowel sounds.    : No CVA tenderness  EXTREMITIES: Without any edema.  MSK: Rt Knee swelling and redness   NEUROLOGIC: No Focal Deficits  PSYCHIATRIC: Appropriate affect .  SKIN: No Rash      Vitals:  T(F): 98.5 (17 Sep 2020 07:49), Max: 99 (16 Sep 2020 15:28)  HR: 69 (17 Sep 2020 07:49)  BP: 104/73 (17 Sep 2020 07:49)  RR: 18 (17 Sep 2020 07:49)  SpO2: 99% (17 Sep 2020 07:49) (95% - 99%)  temp max in last 48H T(F): , Max: 99 (09-16-20 @ 15:28)      Current Antibiotics:  ceFAZolin   IVPB      ceFAZolin   IVPB 2000 milliGRAM(s) IV Intermittent every 8 hours  vancomycin  IVPB 1000 milliGRAM(s) IV Intermittent every 12 hours  vancomycin  IVPB        Other medications:  influenza   Vaccine 0.5 milliLiter(s) IntraMuscular once  lactated ringers. 1000 milliLiter(s) IV Continuous <Continuous>      Labs:             11.1   10.05 )-----------( 336      ( 16 Sep 2020 16:29 )             33.4      09-16    137  |  100  |  17.0  ----------------------------<  97  3.9   |  25.0  |  1.13    Ca    9.1      16 Sep 2020 16:29    TPro  6.7  /  Alb  4.4  /  TBili  0.3<L>  /  DBili  x   /  AST  44<H>  /  ALT  27  /  AlkPhos  120  09-16    WBC Count: 10.05 K/uL (09-16-20 @ 16:29)    Creatinine, Serum: 1.13 mg/dL (09-16-20 @ 16:29)    C-Reactive Protein, Serum: 2.15 mg/dL (09-16-20 @ 16:29)    Sedimentation Rate, Erythrocyte: 37 mm/hr (09-16-20 @ 16:29)    COVID-19 PCR: NotDetec (09-16-20 @ 15:51)

## 2020-09-17 NOTE — DISCHARGE NOTE PROVIDER - HOSPITAL COURSE
The patient underwent a RIGHT TOTAL KNEE WASHOUT on 9/17 for a superficial infection of right total knee arthroplasty. The patient was seen by Infectious Disease and received antibiotics for treatment of infection. The patient underwent the procedure and had no intra-operative complications. Post-operatively, the patient was seen by medicine and PT. The patient received ASPIRIN and PLAVIX for DVTP. The patient received pain medications per orthopedic pain managment pathway and the pain was appropriately controlled. The patient did not have any post-operative medical complications. The patient was discharged in stable condition. The patient underwent a RIGHT TOTAL KNEE superficial I&D on 9/17 for a superficial infection of right knee s/p total knee arthroplasty. The patient was seen by Infectious Disease and received antibiotics for treatment of infection. The patient underwent the procedure and had no intra-operative complications. Post-operatively, the patient was seen by medicine and PT. The patient received ASPIRIN and PLAVIX for DVTP. The patient received pain medications per orthopedic pain managment pathway and the pain was appropriately controlled. The patient did not have any post-operative medical complications. The patient was discharged in stable condition.

## 2020-09-17 NOTE — PROGRESS NOTE ADULT - ASSESSMENT
60y  Male  with h/o CAD s/p Stents, HLD, recent Right total knee arthroplasty on 8/25/20. Patient sent to the [facility] ER for possible Rt knee septic arthritis. Patient had redness over surgical site and some discomfort. Denies fever or chills. Reported minimal difficulty walking. In the office synovial fluid was sampled and he was sent to the ER. In the ER patient is afebrile, no leukocytosis.      r/o Septic joint infection   Rt knee incision site infection   s/p Right total knee arthroplasty on 8/25/20  CAD      - Blood cultures ordered and pending  - COVID 19 PCR pending  - XR - rt knee from 8/25 reviewed   - ESR 37  - CRP 2.15  - Synovial fluid analysis not consistent with prosthetic joint infection   - Synovial fluid culture pending  - Continue Cefazolin  - Continue Vancomycin  - Monitor trough  - Monitor for Vancomycin toxicity   - Trend Fever  - Trend Leukocytosis      Will Follow

## 2020-09-17 NOTE — DISCHARGE NOTE PROVIDER - NSDCCPCAREPLAN_GEN_ALL_CORE_FT
PRINCIPAL DISCHARGE DIAGNOSIS  Diagnosis: Abnormal surgical wound, initial encounter  Assessment and Plan of Treatment:

## 2020-09-17 NOTE — BRIEF OPERATIVE NOTE - NSICDXBRIEFPROCEDURE_GEN_ALL_CORE_FT
PROCEDURES:  Revision of operative wound of anterior segment, not elsewhere classified 17-Sep-2020 17:28:10  Mateusz Qureshi

## 2020-09-17 NOTE — PROGRESS NOTE ADULT - ASSESSMENT
58 yo M Hx of HLD, CAD s/p PCI stents x9,last one 3 yrs ago , Hx hiatal hernia  with recent Rt knee replacement in 08/2020 presented today to Dr Qureshi's office with c.o rt knee pain, swelling, redness and discharge from the wound. He underwent arthrocentesis and has been admitted for further care. He is being planned for Rt knee Washout in OR in am , we were consulted for pre-op risk stratification, He denies any chest pain, sob, cough, fever. He has been very active before his Surgery, was able to bike for miles , able to work out without any chest pain , sob, palpitations, non-smoker, was seen by Dr Wong his Cardiologist in early Aug and Nuclear stress test was negative for ischemia per pt.     # Rt prosthetic knee infection - f/u synovial fluid cultures, iv antibiotics empiric - Per ID recs , OR washout in am , OR cultures   There is no absolute contraindication for the said procedure or surgery.  benefits outweigh risks. EKG - same as compared on 8/25, may proceed to OR.      # CAD s/p stents - on asa, plavix   # HPL - not on statins     Will follow    60 yo M Hx of HLD, CAD s/p PCI stents x9,last one 3 yrs ago , Hx hiatal hernia  with recent Rt knee replacement in 08/2020 presented today to Dr Qureshi's office with c.o rt knee pain, swelling, redness and discharge from the wound. He underwent arthrocentesis and has been admitted for further care. He is being planned for Rt knee Washout in OR in am , we were consulted for pre-op risk stratification, He denies any chest pain, sob, cough, fever. He has been very active before his Surgery, was able to bike for miles , able to work out without any chest pain , sob, palpitations, non-smoker, was seen by Dr Wong his Cardiologist in early Aug and Nuclear stress test was negative for ischemia per pt.     # Rt prosthetic knee infection - f/u synovial fluid cultures, iv antibiotics empiric - Per ID recs , OR washout in am , OR cultures   There is no absolute contraindication for the said procedure or surgery.  benefits outweigh risks. EKG - same as compared on 8/25, recent cardiac work up in 07/2020 including ECHO and nuclear stress test was done. Nuclear stress test showed apoorva-infarct ischemia, with WMA, was seen by Dr Wong who advised to start metoprolol however he has been bradycardic and was later advised to discontinue. Underwent Rt knee replacement in 08/20 uneventful. No new cardiac symptoms, has been ambulating and working out, meets >4METs   may proceed to OR.    Antibiotics per ID  f/u cultures     # CAD s/p stents - on asa, plavix   # HPL - not on statins

## 2020-09-17 NOTE — DISCHARGE NOTE PROVIDER - NSDCFUSCHEDAPPT_GEN_ALL_CORE_FT
SHA GUTIERREZ ; 10/13/2020 ; NPP [facility] [place] [address] [facility]  SHA GUTIERREZ ; 10/23/2020 ; NPP Cardiology [address]  SHA GUTIERREZ ; 11/18/2020 ; NPP [facility] [place] [address]

## 2020-09-17 NOTE — DISCHARGE NOTE PROVIDER - NSDCMRMEDTOKEN_GEN_ALL_CORE_FT
aspirin 81 mg oral delayed release tablet: 1 tab(s) orally 2 times a day - continue until 10/7 then reduce to once daily  celecoxib 200 mg oral capsule: 1 cap(s) orally 2 times a day - last dose on 9/15/20 MDD:2  omeprazole 20 mg oral delayed release capsule: 1 cap(s) orally once a day - last dose on 10/6/20 MDD:1  oxycodone-acetaminophen 5 mg-325 mg oral tablet: 1-2 tab(s) orally every 4 to 6 hours MDD:8  Plavix 75 mg oral tablet: 1 tab(s) orally once a day  Senna S 50 mg-8.6 mg oral tablet: 2 tab(s) orally once a day (at bedtime), As Needed -for constipation MDD:2    aspirin 81 mg oral delayed release tablet: 1 tab(s) orally 2 times a day  ceFAZolin 2 g intravenous injection: 2 gram(s) intravenous every 8 hours  omeprazole 20 mg oral delayed release capsule: 1 cap(s) orally once a day - last dose on 10/6/20 MDD:1  oxyCODONE 5 mg oral tablet: 1-2  tab(s) orally every 4 to 6 hours, As Needed - Pain  MDD:7  Plavix 75 mg oral tablet: 1 tab(s) orally once a day  Senna S 50 mg-8.6 mg oral tablet: 2 tab(s) orally once a day (at bedtime), As Needed -for constipation MDD:2

## 2020-09-17 NOTE — DISCHARGE NOTE PROVIDER - NSDCFUADDINST_GEN_ALL_CORE_FT
The patient will be seen in the office between 2-3 weeks for wound check.   **PLEASE CONTACT OFFICE TO CONFIRM THE APPOINTMENT DATE. Staples will be removed at that time.  **  The PREVENA wound vac dressing is to be removed 7 days from the date of surgery (9/24).   ** CONTACT THE OFFICE IF THE FOLLOWING DEVELOP:  - the dressing becomes soiled or saturated  - you develop a fever greater that 101F  - the wound becomes red or you develop blistering around the wound   * The patient will continue home PT consistent with  total knee replacement protocol. Transition to outpatient PT will occur at the time of the first office visit.   * The patient is FULL weight bearing with Knee Immobilizer in place at all times.  * The patient will continue ASPIRIN 81mg twice a day for 6 weeks after surgery for blood clot prevention and then resume home dose of 81mg once a day.   * The patient will continue Plavix 75mg once a day.  * While on aspirin, the patient will take daily omeprazole or other similar medication to protect the stomach from irritation.   * The patient will take OXYCODONE AND TYLENOL for pain control and adjust according to prescription and patient needs. Contact the office if pain increases while taking prescribed pain medications or related concerns develop.  * Celebrex will be taken twice daily for 3 weeks for pain control and prevention of excessive bone growth. Additional prescription may be requested at your office follow-up visit.   * The patient will take Senna S while taking oxycodone to prevent narcotic associated constipation.  Additionally, increase water intake (drink at least 8 glasses of water daily) and try adding fiber to the diet by eating fruits, vegetables and foods that are rich in grains. If constipation is experienced, contact the medical/primary care provider to discuss further treatment options.  * To avoid injury at home:  - continue use of rolling walker until cleared by physical therapist  - have family or friend remove all throw rug or objects in hallways that may present a trip hazard.  - if you experience any dizziness or medical concerns, call your medical doctor or  911.  * The implant may activate metal detection devices. The patient will be seen in the office between 2-3 weeks for wound check.   **PLEASE CONTACT OFFICE TO CONFIRM THE APPOINTMENT DATE. Staples will be removed at that time.  **  The PREVENA wound vac dressing is to be removed 7 days from the date of surgery (9/24). Keep dressing dry.  ** CONTACT THE OFFICE IF THE FOLLOWING DEVELOP:  - the dressing becomes soiled or saturated  - you develop a fever greater that 101F  - the wound becomes red or you develop blistering around the wound   * The patient will continue home PT consistent with  total knee replacement protocol. Transition to outpatient PT will occur at the time of the first office visit.   * The patient is FULL weight bearing with Knee Immobilizer in place at all times.  * The patient will continue ASPIRIN 81mg twice a day for 6 weeks after surgery for blood clot prevention and then resume home dose of 81mg once a day.   * The patient will continue Plavix 75mg once a day.  * While on aspirin, the patient will take daily omeprazole or other similar medication to protect the stomach from irritation.   * The patient will take OXYCODONE AND TYLENOL for pain control and adjust according to prescription and patient needs. Contact the office if pain increases while taking prescribed pain medications or related concerns develop.  * Celebrex will be taken twice daily for 3 weeks for pain control and prevention of excessive bone growth. Additional prescription may be requested at your office follow-up visit.   * The patient will take Senna S while taking oxycodone to prevent narcotic associated constipation.  Additionally, increase water intake (drink at least 8 glasses of water daily) and try adding fiber to the diet by eating fruits, vegetables and foods that are rich in grains. If constipation is experienced, contact the medical/primary care provider to discuss further treatment options.  * To avoid injury at home:  - continue use of rolling walker until cleared by physical therapist  - have family or friend remove all throw rug or objects in hallways that may present a trip hazard.  - if you experience any dizziness or medical concerns, call your medical doctor or  911.  * The implant may activate metal detection devices. The patient will be seen in the office between 1 week for wound check.   **PLEASE CONTACT OFFICE TO CONFIRM THE APPOINTMENT DATE. Staples will be removed at that time.  **  The PREVENA wound vac dressing is to be removed in 7 days (9/27). Keep dressing dry.  ** CONTACT THE OFFICE IF THE FOLLOWING DEVELOP:  - the dressing becomes soiled or saturated  - you develop a fever greater that 101F  - the wound becomes red or you develop blistering around the wound   * The patient will continue home PT consistent with  total knee replacement protocol. Transition to outpatient PT will occur at the time of the first office visit.   * The patient is FULL weight bearing with Knee Immobilizer in place at all times.  - The patient will continue the IV cefazolin x 4 weeks per ID  * The patient will continue ASPIRIN 81mg twice a day for 6 weeks after surgery for blood clot prevention and then resume home dose of 81mg once a day.   * The patient will continue Plavix 75mg once a day.  * While on aspirin, the patient will take daily omeprazole or other similar medication to protect the stomach from irritation.   * The patient will take OXYCODONE AND TYLENOL for pain control and adjust according to prescription and patient needs. Contact the office if pain increases while taking prescribed pain medications or related concerns develop.  * Celebrex will be taken twice daily for 3 weeks for pain control and prevention of excessive bone growth. Additional prescription may be requested at your office follow-up visit.   * The patient will take Senna S while taking oxycodone to prevent narcotic associated constipation.  Additionally, increase water intake (drink at least 8 glasses of water daily) and try adding fiber to the diet by eating fruits, vegetables and foods that are rich in grains. If constipation is experienced, contact the medical/primary care provider to discuss further treatment options.  * To avoid injury at home:  - continue use of rolling walker until cleared by physical therapist  - have family or friend remove all throw rug or objects in hallways that may present a trip hazard.  - if you experience any dizziness or medical concerns, call your medical doctor or  911.  * The implant may activate metal detection devices. The patient will be seen in the office between 1 week for wound check.   **PLEASE CONTACT OFFICE TO CONFIRM THE APPOINTMENT DATE. Staples will be removed at that time.  **Continue the knee immobilizer until follow up in office  **  The PREVENA wound vac dressing is to be removed in 7 days (9/27). Keep dressing dry.  ** CONTACT THE OFFICE IF THE FOLLOWING DEVELOP:  - the dressing becomes soiled or saturated  - you develop a fever greater that 101F  - the wound becomes red or you develop blistering around the wound   * The patient will continue home PT consistent with  total knee replacement protocol. Transition to outpatient PT will occur at the time of the first office visit.   * The patient is FULL weight bearing with Knee Immobilizer in place at all times.  - The patient will continue the IV cefazolin x 4 weeks per ID  * The patient will continue ASPIRIN 81mg twice a day for 6 weeks after surgery for blood clot prevention and then resume home dose of 81mg once a day.   * The patient will continue Plavix 75mg once a day.  * While on aspirin, the patient will take daily omeprazole or other similar medication to protect the stomach from irritation.   * The patient will take OXYCODONE AND TYLENOL for pain control and adjust according to prescription and patient needs. Contact the office if pain increases while taking prescribed pain medications or related concerns develop.  * Celebrex will be taken twice daily for 3 weeks for pain control and prevention of excessive bone growth. Additional prescription may be requested at your office follow-up visit.   * The patient will take Senna S while taking oxycodone to prevent narcotic associated constipation.  Additionally, increase water intake (drink at least 8 glasses of water daily) and try adding fiber to the diet by eating fruits, vegetables and foods that are rich in grains. If constipation is experienced, contact the medical/primary care provider to discuss further treatment options.  * To avoid injury at home:  - continue use of rolling walker until cleared by physical therapist  - have family or friend remove all throw rug or objects in hallways that may present a trip hazard.  - if you experience any dizziness or medical concerns, call your medical doctor or  911.  * The implant may activate metal detection devices.

## 2020-09-17 NOTE — DISCHARGE NOTE PROVIDER - PROVIDER TOKENS
PROVIDER:[TOKEN:[[id number]]] PROVIDER:[TOKEN:[[id number]]],PROVIDER:[TOKEN:[[id number]]]

## 2020-09-18 LAB
ANION GAP SERPL CALC-SCNC: 10 MMOL/L — SIGNIFICANT CHANGE UP (ref 5–17)
BUN SERPL-MCNC: 13 MG/DL — SIGNIFICANT CHANGE UP (ref 8–20)
CALCIUM SERPL-MCNC: 8.6 MG/DL — SIGNIFICANT CHANGE UP (ref 8.6–10.2)
CHLORIDE SERPL-SCNC: 99 MMOL/L — SIGNIFICANT CHANGE UP (ref 98–107)
CO2 SERPL-SCNC: 26 MMOL/L — SIGNIFICANT CHANGE UP (ref 22–29)
CREAT SERPL-MCNC: 0.76 MG/DL — SIGNIFICANT CHANGE UP (ref 0.5–1.3)
GLUCOSE SERPL-MCNC: 110 MG/DL — HIGH (ref 70–99)
HCT VFR BLD CALC: 28.5 % — LOW (ref 39–50)
HGB BLD-MCNC: 9.6 G/DL — LOW (ref 13–17)
MCHC RBC-ENTMCNC: 32.2 PG — SIGNIFICANT CHANGE UP (ref 27–34)
MCHC RBC-ENTMCNC: 33.7 GM/DL — SIGNIFICANT CHANGE UP (ref 32–36)
MCV RBC AUTO: 95.6 FL — SIGNIFICANT CHANGE UP (ref 80–100)
PLATELET # BLD AUTO: 277 K/UL — SIGNIFICANT CHANGE UP (ref 150–400)
POTASSIUM SERPL-MCNC: 4.6 MMOL/L — SIGNIFICANT CHANGE UP (ref 3.5–5.3)
POTASSIUM SERPL-SCNC: 4.6 MMOL/L — SIGNIFICANT CHANGE UP (ref 3.5–5.3)
RBC # BLD: 2.98 M/UL — LOW (ref 4.2–5.8)
RBC # FLD: 12.6 % — SIGNIFICANT CHANGE UP (ref 10.3–14.5)
SODIUM SERPL-SCNC: 135 MMOL/L — SIGNIFICANT CHANGE UP (ref 135–145)
VANCOMYCIN TROUGH SERPL-MCNC: 6.6 UG/ML — LOW (ref 10–20)
WBC # BLD: 6.03 K/UL — SIGNIFICANT CHANGE UP (ref 3.8–10.5)
WBC # FLD AUTO: 6.03 K/UL — SIGNIFICANT CHANGE UP (ref 3.8–10.5)

## 2020-09-18 PROCEDURE — 99232 SBSQ HOSP IP/OBS MODERATE 35: CPT

## 2020-09-18 RX ORDER — METOCLOPRAMIDE HCL 10 MG
10 TABLET ORAL ONCE
Refills: 0 | Status: COMPLETED | OUTPATIENT
Start: 2020-09-18 | End: 2020-09-18

## 2020-09-18 RX ADMIN — OXYCODONE HYDROCHLORIDE 10 MILLIGRAM(S): 5 TABLET ORAL at 19:01

## 2020-09-18 RX ADMIN — Medication 15 MILLIGRAM(S): at 14:06

## 2020-09-18 RX ADMIN — Medication 100 MILLIGRAM(S): at 05:14

## 2020-09-18 RX ADMIN — Medication 81 MILLIGRAM(S): at 06:21

## 2020-09-18 RX ADMIN — ONDANSETRON 4 MILLIGRAM(S): 8 TABLET, FILM COATED ORAL at 05:06

## 2020-09-18 RX ADMIN — Medication 250 MILLIGRAM(S): at 18:47

## 2020-09-18 RX ADMIN — Medication 100 MILLIGRAM(S): at 22:59

## 2020-09-18 RX ADMIN — Medication 15 MILLIGRAM(S): at 06:35

## 2020-09-18 RX ADMIN — OXYCODONE HYDROCHLORIDE 10 MILLIGRAM(S): 5 TABLET ORAL at 15:00

## 2020-09-18 RX ADMIN — OXYCODONE HYDROCHLORIDE 10 MILLIGRAM(S): 5 TABLET ORAL at 18:47

## 2020-09-18 RX ADMIN — Medication 15 MILLIGRAM(S): at 14:42

## 2020-09-18 RX ADMIN — PANTOPRAZOLE SODIUM 40 MILLIGRAM(S): 20 TABLET, DELAYED RELEASE ORAL at 06:21

## 2020-09-18 RX ADMIN — POLYETHYLENE GLYCOL 3350 17 GRAM(S): 17 POWDER, FOR SOLUTION ORAL at 14:06

## 2020-09-18 RX ADMIN — OXYCODONE HYDROCHLORIDE 10 MILLIGRAM(S): 5 TABLET ORAL at 14:04

## 2020-09-18 RX ADMIN — OXYCODONE HYDROCHLORIDE 10 MILLIGRAM(S): 5 TABLET ORAL at 01:40

## 2020-09-18 RX ADMIN — SODIUM CHLORIDE 100 MILLILITER(S): 9 INJECTION, SOLUTION INTRAVENOUS at 06:28

## 2020-09-18 RX ADMIN — Medication 100 MILLIGRAM(S): at 14:16

## 2020-09-18 RX ADMIN — OXYCODONE HYDROCHLORIDE 10 MILLIGRAM(S): 5 TABLET ORAL at 23:04

## 2020-09-18 RX ADMIN — Medication 81 MILLIGRAM(S): at 18:48

## 2020-09-18 RX ADMIN — OXYCODONE HYDROCHLORIDE 10 MILLIGRAM(S): 5 TABLET ORAL at 23:59

## 2020-09-18 RX ADMIN — Medication 975 MILLIGRAM(S): at 23:59

## 2020-09-18 RX ADMIN — Medication 250 MILLIGRAM(S): at 07:14

## 2020-09-18 RX ADMIN — Medication 15 MILLIGRAM(S): at 06:20

## 2020-09-18 RX ADMIN — Medication 975 MILLIGRAM(S): at 14:42

## 2020-09-18 RX ADMIN — OXYCODONE HYDROCHLORIDE 10 MILLIGRAM(S): 5 TABLET ORAL at 02:40

## 2020-09-18 RX ADMIN — CLOPIDOGREL BISULFATE 75 MILLIGRAM(S): 75 TABLET, FILM COATED ORAL at 14:07

## 2020-09-18 RX ADMIN — Medication 10 MILLIGRAM(S): at 09:20

## 2020-09-18 RX ADMIN — Medication 975 MILLIGRAM(S): at 22:59

## 2020-09-18 RX ADMIN — Medication 975 MILLIGRAM(S): at 14:07

## 2020-09-18 NOTE — OCCUPATIONAL THERAPY INITIAL EVALUATION ADULT - PHYSICAL ASSIST/NONPHYSICAL ASSIST:DRESS LOWER BODY, OT EVAL
verbal cues/1 person assist/Pt educated in LB dressing devices, not shown demonstration and deferred further education stating his sister is available if he needs/nonverbal cues (demo/gestures)

## 2020-09-18 NOTE — PHYSICAL THERAPY INITIAL EVALUATION ADULT - GENERAL OBSERVATIONS, REHAB EVAL
Patient received lying in bed, NAD, breathing RA, +right knee immobilizer, +prevena vac. Pt agreeable to Physical Therapy evaluation.

## 2020-09-18 NOTE — PHYSICAL THERAPY INITIAL EVALUATION ADULT - ADDITIONAL COMMENTS
Pt lives in apartment attached to sister's house with 1 step to enter, no hand rail and no steps inside. Pt independent prior to admission without AD for ambulation. Pt owns RW, cane, commode. (+) drives. Sister is available to assist upon discharge if needed

## 2020-09-18 NOTE — OCCUPATIONAL THERAPY INITIAL EVALUATION ADULT - ADDITIONAL COMMENTS
Pt lives in apartment attached to sister's house with 1 step to enter, no hand rail and no steps inside. Bathroom has shower stall with door and no grab bars. Pt owns RW, cane, commode but was not using PTA. Pt is left handed. Pt drives. Sister is available to assist upon discharge if needed.

## 2020-09-18 NOTE — PROGRESS NOTE ADULT - SUBJECTIVE AND OBJECTIVE BOX
SHA GUTIERREZ    [id number]    60y      Male    CC: Rt knee pain - admitted with surgical wound infection s/p rt knee arthroplasty  s/p debridement and revision of wound POD#1   c/o nausea   no other complaints      INTERVAL HPI/OVERNIGHT EVENTS: no acute events     REVIEW OF SYSTEMS:    CONSTITUTIONAL: No fever,  fatigue  RESPIRATORY: No cough, wheezing, No shortness of breath  CARDIOVASCULAR: No chest pain, palpitations  GASTROINTESTINAL: No abdominal or epigastric pain. No nausea, vomiting        Vital Signs Last 24 Hrs  T(C): 36.4 (18 Sep 2020 08:03), Max: 36.8 (17 Sep 2020 17:41)  T(F): 97.5 (18 Sep 2020 08:03), Max: 98.2 (17 Sep 2020 17:41)  HR: 54 (18 Sep 2020 08:03) (54 - 70)  BP: 109/67 (18 Sep 2020 08:03) (99/52 - 124/59)  BP(mean): --  RR: 18 (18 Sep 2020 08:03) (9 - 18)  SpO2: 95% (18 Sep 2020 08:03) (92% - 100%)    PHYSICAL EXAM:    GENERAL: NAD, well-groomed  HEENT: PERRL, +EOMI  NECK: soft, Supple  CHEST/LUNG: Clear to percussion bilaterally; No wheezing  HEART: S1S2+, Regular rate and rhythm; No murmur  ABDOMEN: Soft, Nontender, Nondistended; Bowel sounds present  EXTREMITIES:  No clubbing, cyanosis, or edema. RLE in dressing and knee immobilizer   SKIN: No rashes or lesions  NEURO: AAOX3        LABS:                                            9.6    6.03  )-----------( 277      ( 18 Sep 2020 05:46 )             28.5   09-18    135  |  99  |  13.0  ----------------------------<  110<H>  4.6   |  26.0  |  0.76    Ca    8.6      18 Sep 2020 05:46    TPro  6.7  /  Alb  4.4  /  TBili  0.3<L>  /  DBili  x   /  AST  44<H>  /  ALT  27  /  AlkPhos  120  09-16             MEDICATIONS  (STANDING):  acetaminophen   Tablet .. 975 milliGRAM(s) Oral every 8 hours  aspirin enteric coated 81 milliGRAM(s) Oral two times a day  ceFAZolin   IVPB 2000 milliGRAM(s) IV Intermittent every 8 hours  clopidogrel Tablet 75 milliGRAM(s) Oral daily  influenza   Vaccine 0.5 milliLiter(s) IntraMuscular once  ketorolac   Injectable 15 milliGRAM(s) IV Push every 6 hours  lactated ringers. 1000 milliLiter(s) (100 mL/Hr) IV Continuous <Continuous>  pantoprazole    Tablet 40 milliGRAM(s) Oral before breakfast  polyethylene glycol 3350 17 Gram(s) Oral daily  vancomycin  IVPB 1000 milliGRAM(s) IV Intermittent every 12 hours    MEDICATIONS  (PRN):  aluminum hydroxide/magnesium hydroxide/simethicone Suspension 30 milliLiter(s) Oral four times a day PRN Indigestion  HYDROmorphone   Tablet 4 milliGRAM(s) Oral every 3 hours PRN Severe Pain (7 - 10)  HYDROmorphone  Injectable 0.5 milliGRAM(s) IV Push every 3 hours PRN breakthrough pain  magnesium hydroxide Suspension 30 milliLiter(s) Oral daily PRN Constipation  ondansetron Injectable 4 milliGRAM(s) IV Push every 6 hours PRN Nausea and/or Vomiting  oxyCODONE    IR 5 milliGRAM(s) Oral every 3 hours PRN Mild Pain (1 - 3)  oxyCODONE    IR 10 milliGRAM(s) Oral every 3 hours PRN Moderate Pain (4 - 6)  senna 2 Tablet(s) Oral at bedtime PRN Constipation      RADIOLOGY & ADDITIONAL TESTS:    EKG - tracing reviewed - NSR - age - indeterminate inferior infarct.

## 2020-09-18 NOTE — PROGRESS NOTE ADULT - SUBJECTIVE AND OBJECTIVE BOX
SHA GUTIERREZ  [id number]    History: 60y Male is status post right total knee arthroplasty POD # 1. Patient is doing well and is comfortable. The patient's pain is controlled using the prescribed pain medications. The patient is participating in physical therapy. Patient reports nausea overnight. Denies CP, SOB, dizziness, HA, fever/chills, numbness or tingling.     Vital Signs Last 24 Hrs  T(C): 36.6 (18 Sep 2020 04:33), Max: 36.8 (17 Sep 2020 17:41)  T(F): 97.8 (18 Sep 2020 04:33), Max: 98.2 (17 Sep 2020 17:41)  HR: 54 (18 Sep 2020 04:33) (54 - 70)  BP: 123/77 (18 Sep 2020 04:33) (99/52 - 124/59)  BP(mean): --  RR: 18 (18 Sep 2020 04:33) (9 - 18)  SpO2: 98% (18 Sep 2020 04:33) (92% - 100%)                          9.6    6.03  )-----------( 277      ( 18 Sep 2020 05:46 )             28.5     09-18    135  |  99  |  13.0  ----------------------------<  110<H>  4.6   |  26.0  |  0.76    Ca    8.6      18 Sep 2020 05:46    TPro  6.7  /  Alb  4.4  /  TBili  0.3<L>  /  DBili  x   /  AST  44<H>  /  ALT  27  /  AlkPhos  120  09-16      General: Alert, awake, NAD  Physical exam: The right knee dressing is clean, dry and intact. No drainage, discharge, erythema, blistering or ecchymosis noted. Knee immobilizer in place Prevena vac functioning properly, tube patent.   The calf is supple nontender b/l.   SILT. +AT/GC/EHL/FHL.   2+ DP pulse. BCR. No cyanosis.    Plan:   - DVT prophylaxis as prescribed, including use of compression devices and ankle pumps  - Continue physical therapy  - Weight bearing status of surgical extremity: WBAT with KI in place to RLE at ALL times  - Incentive spirometry encouraged  - Pain control as clinically indicated  - f/u OR culture  - ID following  - continue IV abx as per ID   - Discharge planning – anticipated discharge is Home / subacute rehabilitation / acute rehabilitation    SHA GUTIERREZ  [id number]    History: 60y Male is status post right knee washout POD # 1. Patient is doing well and is comfortable. The patient's pain is controlled using the prescribed pain medications. The patient is participating in physical therapy. Patient reports nausea overnight. Denies CP, SOB, dizziness, HA, fever/chills, numbness or tingling.     Vital Signs Last 24 Hrs  T(C): 36.6 (18 Sep 2020 04:33), Max: 36.8 (17 Sep 2020 17:41)  T(F): 97.8 (18 Sep 2020 04:33), Max: 98.2 (17 Sep 2020 17:41)  HR: 54 (18 Sep 2020 04:33) (54 - 70)  BP: 123/77 (18 Sep 2020 04:33) (99/52 - 124/59)  BP(mean): --  RR: 18 (18 Sep 2020 04:33) (9 - 18)  SpO2: 98% (18 Sep 2020 04:33) (92% - 100%)                          9.6    6.03  )-----------( 277      ( 18 Sep 2020 05:46 )             28.5     09-18    135  |  99  |  13.0  ----------------------------<  110<H>  4.6   |  26.0  |  0.76    Ca    8.6      18 Sep 2020 05:46    TPro  6.7  /  Alb  4.4  /  TBili  0.3<L>  /  DBili  x   /  AST  44<H>  /  ALT  27  /  AlkPhos  120  09-16      General: Alert, awake, NAD  Physical exam: The right knee dressing is clean, dry and intact. No drainage, discharge, erythema, blistering or ecchymosis noted. Knee immobilizer in place Prevena vac functioning properly, tube patent.   The calf is supple nontender b/l.   SILT. +AT/GC/EHL/FHL.   2+ DP pulse. BCR. No cyanosis.    Plan:   - DVT prophylaxis as prescribed, including use of compression devices and ankle pumps  - Continue physical therapy  - Weight bearing status of surgical extremity: WBAT with KI in place to RLE at ALL times  - Incentive spirometry encouraged  - Pain control as clinically indicated  - f/u OR culture  - ID following  - continue IV abx as per ID   - Discharge planning – anticipated discharge is Home / subacute rehabilitation / acute rehabilitation    SHA GUTIERREZ  [id number]    History: 60y Male is status post right knee superficial wound washout POD # 1. Patient is doing well and is comfortable. The patient's pain is controlled using the prescribed pain medications. The patient is participating in physical therapy. Patient reports nausea overnight. Denies CP, SOB, dizziness, HA, fever/chills, numbness or tingling.     Vital Signs Last 24 Hrs  T(C): 36.6 (18 Sep 2020 04:33), Max: 36.8 (17 Sep 2020 17:41)  T(F): 97.8 (18 Sep 2020 04:33), Max: 98.2 (17 Sep 2020 17:41)  HR: 54 (18 Sep 2020 04:33) (54 - 70)  BP: 123/77 (18 Sep 2020 04:33) (99/52 - 124/59)  BP(mean): --  RR: 18 (18 Sep 2020 04:33) (9 - 18)  SpO2: 98% (18 Sep 2020 04:33) (92% - 100%)                          9.6    6.03  )-----------( 277      ( 18 Sep 2020 05:46 )             28.5     09-18    135  |  99  |  13.0  ----------------------------<  110<H>  4.6   |  26.0  |  0.76    Ca    8.6      18 Sep 2020 05:46    TPro  6.7  /  Alb  4.4  /  TBili  0.3<L>  /  DBili  x   /  AST  44<H>  /  ALT  27  /  AlkPhos  120  09-16      General: Alert, awake, NAD  Physical exam: The right knee dressing is clean, dry and intact. No drainage, discharge, erythema, blistering or ecchymosis noted. Knee immobilizer in place Prevena vac functioning properly, tube patent.   The calf is supple nontender b/l.   SILT. +AT/GC/EHL/FHL.   2+ DP pulse. BCR. No cyanosis.    Plan:   - DVT prophylaxis as prescribed, including use of compression devices and ankle pumps  - Continue physical therapy  - Weight bearing status of surgical extremity: WBAT with KI in place to RLE at ALL times  - Incentive spirometry encouraged  - Pain control as clinically indicated  - f/u OR culture  - ID following  - continue IV abx as per ID   - Discharge planning – anticipated discharge is Home / subacute rehabilitation / acute rehabilitation

## 2020-09-18 NOTE — PROGRESS NOTE ADULT - SUBJECTIVE AND OBJECTIVE BOX
[facility]  INFECTIOUS DISEASES AND INTERNAL MEDICINE at [place]  =======================================================  Alan Diaz MD  Diplomates American Board of Internal Medicine and Infectious Diseases  Tel: [phone]      Fax: [phone]  =======================================================    SHA UGTIERREZ [id number]    Follow up: surgical site infection     No fever or chills    No complaints       Allergies:  No Known Allergies      REVIEW OF SYSTEMS:  CONSTITUTIONAL:  No Fever or chills  HEENT:  No diplopia or blurred vision.  No earache, sore throat or runny nose.  CARDIOVASCULAR:  No pressure, squeezing, strangling, tightness, heaviness or aching about the chest, neck, axilla or epigastrium.  RESPIRATORY:  No cough, shortness of breath  GASTROINTESTINAL:  No nausea, vomiting or diarrhea.  GENITOURINARY:  No dysuria, frequency or urgency.   MUSCULOSKELETAL:   no muscle pain. Rt knee tenderness   SKIN:  Rt knee redness  NEUROLOGIC:  No Headaches, seizures   PSYCHIATRIC:  No disorder of thought or mood.  ENDOCRINE:  No heat or cold intolerance  HEMATOLOGICAL:  No easy bruising or bleeding.       Physical Exam:  GEN: NAD, pleasant  HEENT: normocephalic and atraumatic. EOMI. PERRL.  Anicteric  NECK: Supple.   LUNGS: Clear to auscultation.  HEART: Regular rate and rhythm  ABDOMEN: Soft, nontender, and nondistended.  Positive bowel sounds.    : No CVA tenderness  EXTREMITIES: Without any edema.  MSK: Rt Knee swelling and redness   NEUROLOGIC: No Focal Deficits  PSYCHIATRIC: Appropriate affect .  SKIN: No Rash      Vitals:  T(F): 97.5 (18 Sep 2020 08:03), Max: 98.2 (17 Sep 2020 17:41)  HR: 54 (18 Sep 2020 08:03)  BP: 109/67 (18 Sep 2020 08:03)  RR: 18 (18 Sep 2020 08:03)  SpO2: 95% (18 Sep 2020 08:03) (92% - 100%)  temp max in last 48H T(F): , Max: 99 (09-16-20 @ 15:28)      Current Antibiotics:  ceFAZolin   IVPB 2000 milliGRAM(s) IV Intermittent every 8 hours  vancomycin  IVPB 1000 milliGRAM(s) IV Intermittent every 12 hours      Other medications:  acetaminophen   Tablet .. 975 milliGRAM(s) Oral every 8 hours  aspirin enteric coated 81 milliGRAM(s) Oral two times a day  clopidogrel Tablet 75 milliGRAM(s) Oral daily  influenza   Vaccine 0.5 milliLiter(s) IntraMuscular once  lactated ringers. 1000 milliLiter(s) IV Continuous <Continuous>  pantoprazole    Tablet 40 milliGRAM(s) Oral before breakfast  polyethylene glycol 3350 17 Gram(s) Oral daily      Labs:                        9.6    6.03  )-----------( 277      ( 18 Sep 2020 05:46 )             28.5      09-18    135  |  99  |  13.0  ----------------------------<  110<H>  4.6   |  26.0  |  0.76    Ca    8.6      18 Sep 2020 05:46    TPro  6.7  /  Alb  4.4  /  TBili  0.3<L>  /  DBili  x   /  AST  44<H>  /  ALT  27  /  AlkPhos  120  09-16    WBC Count: 6.03 K/uL (09-18-20 @ 05:46)  WBC Count: 10.05 K/uL (09-16-20 @ 16:29)    Creatinine, Serum: 0.76 mg/dL (09-18-20 @ 05:46)  Creatinine, Serum: 1.13 mg/dL (09-16-20 @ 16:29)    C-Reactive Protein, Serum: 2.15 mg/dL (09-16-20 @ 16:29)    Sedimentation Rate, Erythrocyte: 37 mm/hr (09-16-20 @ 16:29)    COVID-19 PCR: NotDetec (09-16-20 @ 15:51)        < from: Xray Knee 1 or 2 Views, Right (08.25.20 @ 11:10) >   EXAM:  KNEE-RIGHT                          PROCEDURE DATE:  08/25/2020      INTERPRETATION:  HISTORY: Postoperative  knee replacement.    Two views of the RIGHT knee are submitted.    Evaluation demonstrates the presence of a tricompartmental knee replacement with the femoral, tibial and patellar components in proper anatomic alignment. There is no fracture .  Impression:  Knee prosthetic components in proper anatomical alignment.    < end of copied text >

## 2020-09-18 NOTE — PROGRESS NOTE ADULT - ASSESSMENT
60y  Male  with h/o CAD s/p Stents, HLD, recent Right total knee arthroplasty on 8/25/20. Patient sent to the [facility] ER for possible Rt knee septic arthritis. Patient had redness over surgical site and some discomfort. Denies fever or chills. Reported minimal difficulty walking. In the office synovial fluid was sampled and he was sent to the ER. In the ER patient is afebrile, no leukocytosis.      Rt knee incision site infection   s/p Washout 9/17/20  s/p Right total knee arthroplasty on 8/25/20  CAD      - Blood cultures ordered and pending  - COVID 19 PCR negative   - XR - rt knee from 8/25 reviewed   - ESR 37  - CRP 2.15  - Synovial fluid analysis not consistent with prosthetic joint infection   - Synovial fluid culture pending  - OR cultures pending   - Continue Cefazolin  - Continue Vancomycin  - Monitor trough  - Monitor for Vancomycin toxicity   - If cultures are negative can D/C Vancomycin   - Trend Fever  - Trend Leukocytosis      Will Follow

## 2020-09-18 NOTE — PROGRESS NOTE ADULT - ASSESSMENT
58 yo M Hx of HLD, CAD s/p PCI stents x9,last one 3 yrs ago , Hx hiatal hernia  with recent Rt knee replacement in 08/2020 presented today to Dr Qureshi's office with c.o rt knee pain, swelling, redness and discharge from the wound. He underwent arthrocentesis and has been admitted for further care. He is being planned for Rt knee Washout in OR in am , we were consulted for pre-op risk stratification, He denies any chest pain, sob, cough, fever. He has been very active before his Surgery, was able to bike for miles , able to work out without any chest pain , sob, palpitations, non-smoker, was seen by Dr Wong his Cardiologist in early Aug and Nuclear stress test was negative for ischemia per pt.     # Rt prosthetic knee infection - f/u synovial fluid cultures, iv antibiotics empiric - Per ID recs , OR  , OR cultures   There is no absolute contraindication for the said procedure or surgery.  benefits outweigh risks. EKG - same as compared on 8/25, recent cardiac work up in 07/2020 including ECHO and nuclear stress test was done. Nuclear stress test showed apoorva-infarct ischemia, with WMA, was seen by Dr Wong who advised to start metoprolol however he has been bradycardic and was later advised to discontinue. Underwent Rt knee replacement in 08/20 uneventful. No new cardiac symptoms, has been ambulating and working out, meets >4METs   may proceed to OR.    Antibiotics per ID  f/u cultures     # CAD s/p stents - on asa, plavix   # HPL - not on statins      58 yo M Hx of HLD, CAD s/p PCI stents x9,last one 3 yrs ago , Hx hiatal hernia  with recent Rt knee replacement in 08/2020 presented today to Dr Qureshi's office with c.o rt knee pain, swelling, redness and discharge from the wound. He underwent arthrocentesis and has been admitted for further care. He is being planned for Rt knee Washout in OR in am , we were consulted for pre-op risk stratification, He denies any chest pain, sob, cough, fever. He has been very active before his Surgery, was able to bike for miles , able to work out without any chest pain , sob, palpitations, non-smoker, was seen by Dr Wong his Cardiologist in early Aug and Nuclear stress test was negative for ischemia per pt.     # Rt prosthetic knee surgical wound infection - f/u synovial fluid cultures, iv antibiotics empiric - Per ID recs , OR  , OR surgical swab cultures   Antibiotics per ID  bowel regimen  wound vac mx, DVT px per ortho   pain control     # acute blood loss anemia - 2/2 post operative state - HD stable   # Postoperative nausea - zofran Prn, reglan monitor    # CAD s/p stents - on asa, plavix   # HPL - not on statins

## 2020-09-18 NOTE — OCCUPATIONAL THERAPY INITIAL EVALUATION ADULT - LEVEL OF INDEPENDENCE: DRESS LOWER BODY, OT EVAL
minimum assist (75% patients effort)/pt independent with left LE, requires assist to don/doff sock on right LE

## 2020-09-19 LAB
-  AMPICILLIN/SULBACTAM: SIGNIFICANT CHANGE UP
-  AMPICILLIN/SULBACTAM: SIGNIFICANT CHANGE UP
-  CEFAZOLIN: SIGNIFICANT CHANGE UP
-  CEFAZOLIN: SIGNIFICANT CHANGE UP
-  CLINDAMYCIN: SIGNIFICANT CHANGE UP
-  CLINDAMYCIN: SIGNIFICANT CHANGE UP
-  ERYTHROMYCIN: SIGNIFICANT CHANGE UP
-  ERYTHROMYCIN: SIGNIFICANT CHANGE UP
-  GENTAMICIN: SIGNIFICANT CHANGE UP
-  GENTAMICIN: SIGNIFICANT CHANGE UP
-  OXACILLIN: SIGNIFICANT CHANGE UP
-  OXACILLIN: SIGNIFICANT CHANGE UP
-  PENICILLIN: SIGNIFICANT CHANGE UP
-  PENICILLIN: SIGNIFICANT CHANGE UP
-  RIFAMPIN: SIGNIFICANT CHANGE UP
-  RIFAMPIN: SIGNIFICANT CHANGE UP
-  TETRACYCLINE: SIGNIFICANT CHANGE UP
-  TETRACYCLINE: SIGNIFICANT CHANGE UP
-  TRIMETHOPRIM/SULFAMETHOXAZOLE: SIGNIFICANT CHANGE UP
-  TRIMETHOPRIM/SULFAMETHOXAZOLE: SIGNIFICANT CHANGE UP
-  VANCOMYCIN: SIGNIFICANT CHANGE UP
-  VANCOMYCIN: SIGNIFICANT CHANGE UP
CULTURE RESULTS: SIGNIFICANT CHANGE UP
METHOD TYPE: SIGNIFICANT CHANGE UP
METHOD TYPE: SIGNIFICANT CHANGE UP
ORGANISM # SPEC MICROSCOPIC CNT: SIGNIFICANT CHANGE UP
ORGANISM # SPEC MICROSCOPIC CNT: SIGNIFICANT CHANGE UP
SPECIMEN SOURCE: SIGNIFICANT CHANGE UP
VANCOMYCIN TROUGH SERPL-MCNC: 6.6 UG/ML — LOW (ref 10–20)

## 2020-09-19 PROCEDURE — 99232 SBSQ HOSP IP/OBS MODERATE 35: CPT

## 2020-09-19 RX ORDER — SENNA PLUS 8.6 MG/1
2 TABLET ORAL AT BEDTIME
Refills: 0 | Status: DISCONTINUED | OUTPATIENT
Start: 2020-09-19 | End: 2020-09-21

## 2020-09-19 RX ADMIN — OXYCODONE HYDROCHLORIDE 10 MILLIGRAM(S): 5 TABLET ORAL at 17:30

## 2020-09-19 RX ADMIN — Medication 100 MILLIGRAM(S): at 12:18

## 2020-09-19 RX ADMIN — Medication 975 MILLIGRAM(S): at 07:30

## 2020-09-19 RX ADMIN — Medication 975 MILLIGRAM(S): at 22:41

## 2020-09-19 RX ADMIN — Medication 975 MILLIGRAM(S): at 06:57

## 2020-09-19 RX ADMIN — Medication 250 MILLIGRAM(S): at 20:26

## 2020-09-19 RX ADMIN — Medication 100 MILLIGRAM(S): at 21:43

## 2020-09-19 RX ADMIN — Medication 81 MILLIGRAM(S): at 06:58

## 2020-09-19 RX ADMIN — OXYCODONE HYDROCHLORIDE 10 MILLIGRAM(S): 5 TABLET ORAL at 21:42

## 2020-09-19 RX ADMIN — Medication 250 MILLIGRAM(S): at 08:02

## 2020-09-19 RX ADMIN — PANTOPRAZOLE SODIUM 40 MILLIGRAM(S): 20 TABLET, DELAYED RELEASE ORAL at 06:58

## 2020-09-19 RX ADMIN — Medication 81 MILLIGRAM(S): at 17:29

## 2020-09-19 RX ADMIN — Medication 975 MILLIGRAM(S): at 21:41

## 2020-09-19 RX ADMIN — SENNA PLUS 2 TABLET(S): 8.6 TABLET ORAL at 21:41

## 2020-09-19 RX ADMIN — OXYCODONE HYDROCHLORIDE 10 MILLIGRAM(S): 5 TABLET ORAL at 22:41

## 2020-09-19 RX ADMIN — POLYETHYLENE GLYCOL 3350 17 GRAM(S): 17 POWDER, FOR SOLUTION ORAL at 13:21

## 2020-09-19 RX ADMIN — CLOPIDOGREL BISULFATE 75 MILLIGRAM(S): 75 TABLET, FILM COATED ORAL at 13:18

## 2020-09-19 RX ADMIN — Medication 100 MILLIGRAM(S): at 06:52

## 2020-09-19 NOTE — PROGRESS NOTE ADULT - ASSESSMENT
[age] yo M Hx of HLD, CAD s/p PCI stents x9,last one 3 yrs ago , Hx hiatal hernia  with recent Rt knee replacement in 08/2020 presented today to Dr Qureshi's office with c.o rt knee pain, swelling, redness and discharge from the wound. He underwent arthrocentesis and has been admitted for further care. He is being planned for Rt knee Washout in OR in am , we were consulted for pre-op risk stratification, He denies any chest pain, sob, cough, fever. He has been very active before his Surgery, was able to bike for miles , able to work out without any chest pain , sob, palpitations, non-smoker, was seen by Dr Wong his Cardiologist in early Aug and Nuclear stress test was negative for ischemia per pt.     # Rt prosthetic knee surgical wound infection - synovial fluid cultures MSSA,  OR surgical swab cultures - staph aureus + , sensitivity pending   Antibiotics per ID  bowel regimen  wound vac mx, DVT px per ortho   pain control     # acute blood loss anemia - 2/2 post operative state - HD stable   # Postoperative nausea - zofran Prn, reglan monitor  - resolved   # CAD s/p stents - on asa, plavix , hx of bradycardia - BB d/jaja on last hospitalization   # HPL - not on statins   # DVT prophylaxis  - as per ortho protocol  Opioid induced constipation  prophylaxis - bowel regimen  , will change Senna to QHS .

## 2020-09-19 NOTE — PROGRESS NOTE ADULT - SUBJECTIVE AND OBJECTIVE BOX
SHA BLOCKO    [id number]    patient seen and examined with attending. He is post-op right knee superficial washout, POD # 2, s/p Right total knee arthroplasty 8/25.  Patient is doing well and is comfortable. The patient's pain is controlled using the prescribed pain medications. The patient is participating in physical therapy. Denies nausea, vomiting, chest pain, shortness of breath, abdominal pain, LE N/T, constitutional symptoms.  No new complaints.    Vital Signs Last 24 Hrs  T(C): 36.4 (19 Sep 2020 08:06), Max: 36.4 (18 Sep 2020 19:52)  T(F): 97.5 (19 Sep 2020 08:06), Max: 97.6 (18 Sep 2020 19:52)  HR: 72 (19 Sep 2020 08:06) (52 - 72)  BP: 154/75 (19 Sep 2020 08:06) (120/56 - 154/75)  BP(mean): --  RR: 18 (19 Sep 2020 08:06) (18 - 18)  SpO2: 98% (19 Sep 2020 08:06) (94% - 98%)                          9.6    6.03  )-----------( 277      ( 18 Sep 2020 05:46 )             28.5     09-18    135  |  99  |  13.0  ----------------------------<  110<H>  4.6   |  26.0  |  0.76    Ca    8.6      18 Sep 2020 05:46        MEDICATIONS  (STANDING):  acetaminophen   Tablet .. 975 milliGRAM(s) Oral every 8 hours  aspirin enteric coated 81 milliGRAM(s) Oral two times a day  ceFAZolin   IVPB 2000 milliGRAM(s) IV Intermittent every 8 hours  clopidogrel Tablet 75 milliGRAM(s) Oral daily  influenza   Vaccine 0.5 milliLiter(s) IntraMuscular once  lactated ringers. 1000 milliLiter(s) (100 mL/Hr) IV Continuous <Continuous>  pantoprazole    Tablet 40 milliGRAM(s) Oral before breakfast  polyethylene glycol 3350 17 Gram(s) Oral daily  vancomycin  IVPB 1000 milliGRAM(s) IV Intermittent every 12 hours    MEDICATIONS  (PRN):  aluminum hydroxide/magnesium hydroxide/simethicone Suspension 30 milliLiter(s) Oral four times a day PRN Indigestion  bisacodyl Suppository 10 milliGRAM(s) Rectal daily PRN If no bowel movement by postoperative day #2  HYDROmorphone   Tablet 4 milliGRAM(s) Oral every 3 hours PRN Severe Pain (7 - 10)  HYDROmorphone  Injectable 0.5 milliGRAM(s) IV Push every 3 hours PRN breakthrough pain  magnesium hydroxide Suspension 30 milliLiter(s) Oral daily PRN Constipation  ondansetron Injectable 4 milliGRAM(s) IV Push every 6 hours PRN Nausea and/or Vomiting  oxyCODONE    IR 5 milliGRAM(s) Oral every 3 hours PRN Mild Pain (1 - 3)  oxyCODONE    IR 10 milliGRAM(s) Oral every 3 hours PRN Moderate Pain (4 - 6)  senna 2 Tablet(s) Oral at bedtime PRN Constipation      Physical exam: NAD, resting comfortably  ace taken down to evaluate skin. The right knee prevena vac dressing is clean, dry and intact, functioning well. No drainage or discharge. No surrounding erythema,  blistering, or ecchymosis. The calf is supple nontender. Passive range of motion is acceptable to due postoperative pain. No calf tenderness. Sensation to light touch is grossly intact distally. Motor function distally is 5/5. Extensor hallucis longus and flexor hallucis longus are intact. No foot drop. 2+ dorsalis pedis pulse. Capillary refill is less than 2 seconds. No cyanosis.    Primary Orthopedic Assessment:  • s/p RIGHT post-op right knee superficial washout, POD # 2, s/p Right total knee arthroplasty 8/25. ID following    Secondary  Orthopedic Assessment(s):   •     Secondary  Medical Assessment(s):   •     Plan:   DVT prophylaxis: ASA/plavix,  use of compression devices and ankle pumps  continue IV abx as per ID, rec abx via PICC X 4 weeks  Continue physical therapy. maintain knee immobilizer all times  Weightbearing as tolerated of the right lower extremity with assistance of a walker,   Incentive spirometry encouraged  Pain control as clinically indicated  Discharge planning    SHA BLOCKO    [id number]    patient seen and examined with attending. He is post-op right knee superficial washout, POD # 2, s/p Right total knee arthroplasty 8/25.  Patient is doing well and is comfortable. The patient's pain is controlled using the prescribed pain medications. The patient is participating in physical therapy. Denies nausea, vomiting, chest pain, shortness of breath, abdominal pain, LE N/T, constitutional symptoms.  No new complaints.    Vital Signs Last 24 Hrs  T(C): 36.4 (19 Sep 2020 08:06), Max: 36.4 (18 Sep 2020 19:52)  T(F): 97.5 (19 Sep 2020 08:06), Max: 97.6 (18 Sep 2020 19:52)  HR: 72 (19 Sep 2020 08:06) (52 - 72)  BP: 154/75 (19 Sep 2020 08:06) (120/56 - 154/75)  BP(mean): --  RR: 18 (19 Sep 2020 08:06) (18 - 18)  SpO2: 98% (19 Sep 2020 08:06) (94% - 98%)                          9.6    6.03  )-----------( 277      ( 18 Sep 2020 05:46 )             28.5     09-18    135  |  99  |  13.0  ----------------------------<  110<H>  4.6   |  26.0  |  0.76    Ca    8.6      18 Sep 2020 05:46    surgical swab prelim: staph aureus    MEDICATIONS  (STANDING):  acetaminophen   Tablet .. 975 milliGRAM(s) Oral every 8 hours  aspirin enteric coated 81 milliGRAM(s) Oral two times a day  ceFAZolin   IVPB 2000 milliGRAM(s) IV Intermittent every 8 hours  clopidogrel Tablet 75 milliGRAM(s) Oral daily  influenza   Vaccine 0.5 milliLiter(s) IntraMuscular once  lactated ringers. 1000 milliLiter(s) (100 mL/Hr) IV Continuous <Continuous>  pantoprazole    Tablet 40 milliGRAM(s) Oral before breakfast  polyethylene glycol 3350 17 Gram(s) Oral daily  vancomycin  IVPB 1000 milliGRAM(s) IV Intermittent every 12 hours    MEDICATIONS  (PRN):  aluminum hydroxide/magnesium hydroxide/simethicone Suspension 30 milliLiter(s) Oral four times a day PRN Indigestion  bisacodyl Suppository 10 milliGRAM(s) Rectal daily PRN If no bowel movement by postoperative day #2  HYDROmorphone   Tablet 4 milliGRAM(s) Oral every 3 hours PRN Severe Pain (7 - 10)  HYDROmorphone  Injectable 0.5 milliGRAM(s) IV Push every 3 hours PRN breakthrough pain  magnesium hydroxide Suspension 30 milliLiter(s) Oral daily PRN Constipation  ondansetron Injectable 4 milliGRAM(s) IV Push every 6 hours PRN Nausea and/or Vomiting  oxyCODONE    IR 5 milliGRAM(s) Oral every 3 hours PRN Mild Pain (1 - 3)  oxyCODONE    IR 10 milliGRAM(s) Oral every 3 hours PRN Moderate Pain (4 - 6)  senna 2 Tablet(s) Oral at bedtime PRN Constipation      Physical exam: NAD, resting comfortably  ace taken down to evaluate skin. The right knee prevena vac dressing is clean, dry and intact, functioning well. No drainage or discharge. No surrounding erythema,  blistering, or ecchymosis. The calf is supple nontender. Passive range of motion is acceptable to due postoperative pain. No calf tenderness. Sensation to light touch is grossly intact distally. Motor function distally is 5/5. Extensor hallucis longus and flexor hallucis longus are intact. No foot drop. 2+ dorsalis pedis pulse. Capillary refill is less than 2 seconds. No cyanosis.    Primary Orthopedic Assessment:  • s/p RIGHT post-op right knee superficial washout, POD # 2, s/p Right total knee arthroplasty 8/25. ID following    Secondary  Orthopedic Assessment(s):   •     Secondary  Medical Assessment(s):   •     Plan:   DVT prophylaxis: ASA/plavix,  use of compression devices and ankle pumps  continue IV abx as per ID, rec abx via PICC X 4 weeks  Continue physical therapy. maintain knee immobilizer all times  Weightbearing as tolerated of the right lower extremity with assistance of a walker,   Incentive spirometry encouraged  Pain control as clinically indicated  Discharge planning

## 2020-09-19 NOTE — PROGRESS NOTE ADULT - SUBJECTIVE AND OBJECTIVE BOX
Patient seen and examined . S/p Revision of operative wound of anterior segment  , POD # 2.; Pain well controlled , no n/v , no BM X 2 days ,   voiding without difficulties     CC : R knee pain well controlled , no BM x 2 days     HPI:  Patient familiar to our service presents to the ER for evaluation of right knee wound. Patient had Right total knee arthroplasty on 8/25/20. Patient states he was feeling better and was back to work and doing well. Noticed a few days ago his knee was causing him some discomfort and looked irritated. He states he just started wearing jeans and they seem to be rubbing against his knee. He states he noticed redness and swelling at the center of the incision. He said he kept trying to "squeeze it like a pimple" and tried to get any drainage out. He went to Dr Qureshi office today where he had his knee aspirated by Dr Qureshi. States he has not checked for any fever nor has felt symptomatic  (16 Sep 2020 17:13)      PAST MEDICAL & SURGICAL HISTORY:  Osteoarthritis of right knee    H/O heart artery stent    Hyperlipidemia    CAD (Coronary Artery Disease)    H/O total knee replacement, right    H/O hernia repair    Fracture, Femur  right    History of Back Surgery  fusion    s/p Angioplasty with Stent        MEDICATIONS  (STANDING):  acetaminophen   Tablet .. 975 milliGRAM(s) Oral every 8 hours  aspirin enteric coated 81 milliGRAM(s) Oral two times a day  ceFAZolin   IVPB 2000 milliGRAM(s) IV Intermittent every 8 hours  clopidogrel Tablet 75 milliGRAM(s) Oral daily  influenza   Vaccine 0.5 milliLiter(s) IntraMuscular once  lactated ringers. 1000 milliLiter(s) (100 mL/Hr) IV Continuous <Continuous>  pantoprazole    Tablet 40 milliGRAM(s) Oral before breakfast  polyethylene glycol 3350 17 Gram(s) Oral daily  vancomycin  IVPB 1000 milliGRAM(s) IV Intermittent every 12 hours    MEDICATIONS  (PRN):  aluminum hydroxide/magnesium hydroxide/simethicone Suspension 30 milliLiter(s) Oral four times a day PRN Indigestion  bisacodyl Suppository 10 milliGRAM(s) Rectal daily PRN If no bowel movement by postoperative day #2  HYDROmorphone   Tablet 4 milliGRAM(s) Oral every 3 hours PRN Severe Pain (7 - 10)  HYDROmorphone  Injectable 0.5 milliGRAM(s) IV Push every 3 hours PRN breakthrough pain  magnesium hydroxide Suspension 30 milliLiter(s) Oral daily PRN Constipation  ondansetron Injectable 4 milliGRAM(s) IV Push every 6 hours PRN Nausea and/or Vomiting  oxyCODONE    IR 5 milliGRAM(s) Oral every 3 hours PRN Mild Pain (1 - 3)  oxyCODONE    IR 10 milliGRAM(s) Oral every 3 hours PRN Moderate Pain (4 - 6)  senna 2 Tablet(s) Oral at bedtime PRN Constipation      LABS:                          9.6    6.03  )-----------( 277      ( 18 Sep 2020 05:46 )             28.5     09-18    135  |  99  |  13.0  ----------------------------<  110<H>  4.6   |  26.0  |  0.76    Ca    8.6      18 Sep 2020 05:46    Culture - Blood (09.16.20 @ 16:33)    Specimen Source: .Blood Blood-Venous    Culture Results:   No growth at 48 hours    Culture - Surgical Swab (09.18.20 @ 02:14)    Specimen Source: .Surgical Swab right knee superficial wound    Culture Results:   Numerous Staphylococcus aureus    Culture - Other (09.16.20 @ 22:34)    -  Ampicillin/Sulbactam: S <=8/4    -  Cefazolin: S <=4    -  Clindamycin: S 0.5    -  Erythromycin: I 1    -  Gentamicin: S <=1 Should not be used as monotherapy    -  Oxacillin: S 0.5    -  Penicillin: R >8    -  RIF- Rifampin: S <=1 Should not be used as monotherapy    -  Tetra/Doxy: S <=1    -  Trimethoprim/Sulfamethoxazole: S <=0.5/9.5    -  Vancomycin: S 1    Specimen Source: Skin right knee wound    Culture Results:   Numerous Staphylococcus aureus    Organism Identification: Staphylococcus aureus    Organism: Staphylococcus aureus    Method Type: JIM    Specimen Source: .Blood Blood-Venous (09.16.20 @ 16:33)    Culture - Blood (09.16.20 @ 19:31)    Specimen Source: .Blood Blood    Culture Results:   No growth at 48 hours    Specimen Source: .Blood Blood (09.16.20 @ 19:31)    COVID-19 PCR: NotDetec: Testing is performed using polymerase chain reaction (PCR) or  transcription mediated amplification (TMA). This COVID-19 (SARS-CoV-2)  nucleic acid amplification test was validated by [employer] and is  in use under the FDA Emergency Use Authorization (EUA) for clinical labs  CLIA-certified to perform high complexity testing. Test results should be  correlated with clinical presentation, patient history, and epidemiology. (09.16.20 @ 15:51)      < from: Xray Knee 1 or 2 Views, Right (08.25.20 @ 11:10) >     EXAM:  KNEE-RIGHT                          PROCEDURE DATE:  08/25/2020          INTERPRETATION:  HISTORY: Postoperative  knee replacement.    Two views of the RIGHT knee are submitted.    Evaluation demonstrates the presence of a tricompartmental knee replacement with the femoral, tibial and patellar components in proper anatomic alignment. There is no fracture .  Impression:  Knee prosthetic components in proper anatomical alignment.      TAMELA MCKEON M.D., ATTENDING RADIOLOGIST  This document has been electronically signed. Aug 25 2020 11:54AM        < end of copied text >  RADIOLOGY & ADDITIONAL TESTS:      REVIEW OF SYSTEMS:  R knee pain well controlled , no BM x 2 days all other systems are reviewed and are negative   Vital Signs Last 24 Hrs  T(C): 36.4 (19 Sep 2020 08:06), Max: 36.4 (18 Sep 2020 19:52)  T(F): 97.5 (19 Sep 2020 08:06), Max: 97.6 (18 Sep 2020 19:52)  HR: 72 (19 Sep 2020 08:06) (52 - 72)  BP: 154/75 (19 Sep 2020 08:06) (120/56 - 154/75)  BP(mean): --  RR: 18 (19 Sep 2020 08:06) (18 - 18)  SpO2: 98% (19 Sep 2020 08:06) (94% - 98%)  PHYSICAL EXAM:    GENERAL: NAD, well-groomed, well-developed  HEAD:  Atraumatic, Normocephalic  EYES: EOMI, PERRLA, conjunctiva and sclera clear  NECK: Supple, No JVD, Normal thyroid  NERVOUS SYSTEM:  Alert & Oriented X3, no focal deficit  CHEST/LUNG: CTA b/l ,  no  rales, rhonchi, wheezing, or rubs  HEART: Regular rate and rhythm; No murmurs, rubs, or gallops  ABDOMEN: Soft, Nontender, Nondistended; Bowel sounds present  EXTREMITIES:  2+ Peripheral Pulses, No clubbing, cyanosis, or edema, R knee Prevena + , immobilizer +   LYMPH: No lymphadenopathy noted  SKIN: No rashes or lesions

## 2020-09-20 LAB — VANCOMYCIN TROUGH SERPL-MCNC: 10.6 UG/ML — SIGNIFICANT CHANGE UP (ref 10–20)

## 2020-09-20 PROCEDURE — 99232 SBSQ HOSP IP/OBS MODERATE 35: CPT

## 2020-09-20 RX ORDER — VANCOMYCIN HCL 1 G
1250 VIAL (EA) INTRAVENOUS EVERY 12 HOURS
Refills: 0 | Status: DISCONTINUED | OUTPATIENT
Start: 2020-09-20 | End: 2020-09-20

## 2020-09-20 RX ADMIN — Medication 100 MILLIGRAM(S): at 12:47

## 2020-09-20 RX ADMIN — PANTOPRAZOLE SODIUM 40 MILLIGRAM(S): 20 TABLET, DELAYED RELEASE ORAL at 05:45

## 2020-09-20 RX ADMIN — Medication 81 MILLIGRAM(S): at 05:45

## 2020-09-20 RX ADMIN — POLYETHYLENE GLYCOL 3350 17 GRAM(S): 17 POWDER, FOR SOLUTION ORAL at 12:47

## 2020-09-20 RX ADMIN — Medication 975 MILLIGRAM(S): at 05:45

## 2020-09-20 RX ADMIN — CLOPIDOGREL BISULFATE 75 MILLIGRAM(S): 75 TABLET, FILM COATED ORAL at 12:47

## 2020-09-20 RX ADMIN — OXYCODONE HYDROCHLORIDE 10 MILLIGRAM(S): 5 TABLET ORAL at 23:00

## 2020-09-20 RX ADMIN — OXYCODONE HYDROCHLORIDE 10 MILLIGRAM(S): 5 TABLET ORAL at 12:46

## 2020-09-20 RX ADMIN — Medication 100 MILLIGRAM(S): at 20:24

## 2020-09-20 RX ADMIN — OXYCODONE HYDROCHLORIDE 10 MILLIGRAM(S): 5 TABLET ORAL at 18:26

## 2020-09-20 RX ADMIN — Medication 975 MILLIGRAM(S): at 23:00

## 2020-09-20 RX ADMIN — Medication 975 MILLIGRAM(S): at 06:45

## 2020-09-20 RX ADMIN — Medication 81 MILLIGRAM(S): at 18:26

## 2020-09-20 RX ADMIN — Medication 250 MILLIGRAM(S): at 08:35

## 2020-09-20 RX ADMIN — Medication 975 MILLIGRAM(S): at 22:05

## 2020-09-20 RX ADMIN — OXYCODONE HYDROCHLORIDE 10 MILLIGRAM(S): 5 TABLET ORAL at 05:51

## 2020-09-20 RX ADMIN — SENNA PLUS 2 TABLET(S): 8.6 TABLET ORAL at 22:04

## 2020-09-20 RX ADMIN — Medication 100 MILLIGRAM(S): at 05:44

## 2020-09-20 RX ADMIN — OXYCODONE HYDROCHLORIDE 10 MILLIGRAM(S): 5 TABLET ORAL at 22:05

## 2020-09-20 RX ADMIN — Medication 975 MILLIGRAM(S): at 15:40

## 2020-09-20 NOTE — PROGRESS NOTE ADULT - SUBJECTIVE AND OBJECTIVE BOX
Patient seen and examined . Doing well , pain well controlled , no n/v , voiding without difficulties     CC : R knee pain well controlled     MEDICATIONS  (STANDING):  acetaminophen   Tablet .. 975 milliGRAM(s) Oral every 8 hours  aspirin enteric coated 81 milliGRAM(s) Oral two times a day  ceFAZolin   IVPB 2000 milliGRAM(s) IV Intermittent every 8 hours  clopidogrel Tablet 75 milliGRAM(s) Oral daily  influenza   Vaccine 0.5 milliLiter(s) IntraMuscular once  lactated ringers. 1000 milliLiter(s) (100 mL/Hr) IV Continuous <Continuous>  pantoprazole    Tablet 40 milliGRAM(s) Oral before breakfast  polyethylene glycol 3350 17 Gram(s) Oral daily  senna 2 Tablet(s) Oral at bedtime  vancomycin  IVPB 1250 milliGRAM(s) IV Intermittent every 12 hours    MEDICATIONS  (PRN):  aluminum hydroxide/magnesium hydroxide/simethicone Suspension 30 milliLiter(s) Oral four times a day PRN Indigestion  bisacodyl Suppository 10 milliGRAM(s) Rectal daily PRN If no bowel movement by postoperative day #2  HYDROmorphone   Tablet 4 milliGRAM(s) Oral every 3 hours PRN Severe Pain (7 - 10)  HYDROmorphone  Injectable 0.5 milliGRAM(s) IV Push every 3 hours PRN breakthrough pain  magnesium hydroxide Suspension 30 milliLiter(s) Oral daily PRN Constipation  ondansetron Injectable 4 milliGRAM(s) IV Push every 6 hours PRN Nausea and/or Vomiting  oxyCODONE    IR 5 milliGRAM(s) Oral every 3 hours PRN Mild Pain (1 - 3)  oxyCODONE    IR 10 milliGRAM(s) Oral every 3 hours PRN Moderate Pain (4 - 6)      LABS:      Culture - Blood (09.16.20 @ 16:33)    Specimen Source: .Blood Blood-Venous    Culture Results:   No growth at 48 hours    Culture - Surgical Swab (09.18.20 @ 02:14)    Specimen Source: .Surgical Swab right knee superficial wound    Culture Results:   Numerous Staphylococcus aureus    Culture - Other (09.16.20 @ 22:34)    -  Ampicillin/Sulbactam: S <=8/4    -  Cefazolin: S <=4    -  Clindamycin: S 0.5    -  Erythromycin: I 1    -  Gentamicin: S <=1 Should not be used as monotherapy    -  Oxacillin: S 0.5    -  Penicillin: R >8    -  RIF- Rifampin: S <=1 Should not be used as monotherapy    -  Tetra/Doxy: S <=1    -  Trimethoprim/Sulfamethoxazole: S <=0.5/9.5    -  Vancomycin: S 1    Specimen Source: Skin right knee wound    Culture Results:   Numerous Staphylococcus aureus    Organism Identification: Staphylococcus aureus    Organism: Staphylococcus aureus    Method Type: JIM    Specimen Source: .Blood Blood-Venous (09.16.20 @ 16:33)    Culture - Blood (09.16.20 @ 19:31)    Specimen Source: .Blood Blood    Culture Results:   No growth at 48 hours    Specimen Source: .Blood Blood (09.16.20 @ 19:31)    COVID-19 PCR: NotDetec: Testing is performed using polymerase chain reaction (PCR) or  transcription mediated amplification (TMA). This COVID-19 (SARS-CoV-2)  nucleic acid amplification test was validated by [facility] and is  in use under the FDA Emergency Use Authorization (EUA) for clinical labs  CLIA-certified to perform high complexity testing. Test results should be  correlated with clinical presentation, patient history, and epidemiology. (09.16.20 @ 15:51)      REVIEW OF SYSTEMS:    Vital Signs Last 24 Hrs  T(C): 36.6 (20 Sep 2020 04:13), Max: 36.9 (19 Sep 2020 19:56)  T(F): 97.8 (20 Sep 2020 04:13), Max: 98.4 (19 Sep 2020 19:56)  HR: 60 (20 Sep 2020 04:13) (60 - 68)  BP: 119/68 (20 Sep 2020 04:13) (110/69 - 123/74)  BP(mean): --  RR: 18 (20 Sep 2020 04:13) (17 - 19)  SpO2: 99% (20 Sep 2020 04:13) (96% - 99%)  PHYSICAL EXAM:    GENERAL: NAD, well-groomed, well-developed  HEAD:  Atraumatic, Normocephalic  EYES: EOMI, PERRLA, conjunctiva and sclera clear  NECK: Supple, No JVD, Normal thyroid  NERVOUS SYSTEM:  Alert & Oriented X3, no focal deficit  CHEST/LUNG: CTA b/l ,  no  rales, rhonchi, wheezing, or rubs  HEART: Regular rate and rhythm; No murmurs, rubs, or gallops  ABDOMEN: Soft, Nontender, Nondistended; Bowel sounds present  EXTREMITIES:  2+ Peripheral Pulses, No clubbing, cyanosis, or edema , R knee immobilizer over Prevena    LYMPH: No lymphadenopathy noted  SKIN: No rashes or lesions

## 2020-09-20 NOTE — PROGRESS NOTE ADULT - ASSESSMENT
60 yo M Hx of HLD, CAD s/p PCI stents x9,last one 3 yrs ago , Hx hiatal hernia  with recent Rt knee replacement in 08/2020 presented today to Dr Qureshi's office with c.o rt knee pain, swelling, redness and discharge from the wound. He underwent arthrocentesis and has been admitted for further care. He is being planned for Rt knee Washout in OR in am , we were consulted for pre-op risk stratification, He denies any chest pain, sob, cough, fever. He has been very active before his Surgery, was able to bike for miles , able to work out without any chest pain , sob, palpitations, non-smoker, was seen by Dr Wong his Cardiologist in early Aug and Nuclear stress test was negative for ischemia per pt.     # Rt prosthetic knee surgical wound infection - synovial fluid cultures/  OR surgical swab cultures - staph aureus + , MSSA , ID follow up pending , Antibiotics per ID   bowel regimen  Prevena /DVT px per ortho   pain control     # acute blood loss anemia - 2/2 post operative state - HD stable   # Postoperative nausea - zofran Prn, reglan monitor  - resolved   # CAD s/p stents - on asa, plavix , hx of bradycardia - BB d/jaja on last hospitalization   # HPL - not on statins   # DVT prophylaxis  - as per ortho protocol  Opioid induced constipation  prophylaxis - bowel regimen  , will change Senna to QHS .      Medically stable to d/c once PICC line placed and Home  Abx arranged  , likely in am   d/w patient / nurse / ortho PA .

## 2020-09-20 NOTE — PROGRESS NOTE ADULT - SUBJECTIVE AND OBJECTIVE BOX
SHA GUTIERREZ    [id number]    History: 60y Male is status post right knee superficial I&D (s/p right TKA), POD #3. Patient is doing well and is comfortable. The patient's pain is controlled using the prescribed pain medications. The patient is participating in physical therapy. He ambulates on his own. Denies nausea, vomiting, chest pain, shortness of breath, abdominal pain or dizziness. No new complaints.      Culture - Surgical Swab (09.18.20 @ 02:14)    -  Ampicillin/Sulbactam: S <=8/4    -  Cefazolin: S <=4    -  Clindamycin: S 0.5    -  Erythromycin: S 0.5    -  Gentamicin: S <=1 Should not be used as monotherapy    -  Oxacillin: S 0.5    -  Penicillin: R >8    -  RIF- Rifampin: S <=1 Should not be used as monotherapy    -  Tetra/Doxy: S <=1    -  Trimethoprim/Sulfamethoxazole: S <=0.5/9.5    -  Vancomycin: S 2    Specimen Source: .Surgical Swab right knee superficial wound    Culture Results:   Numerous Staphylococcus aureus    Organism Identification: Staphylococcus aureus    Organism: Staphylococcus aureus    Method Type: JIM    Culture - Blood (09.16.20 @ 19:31)    Specimen Source: .Blood Blood    Culture Results:   No growth at 48 hours    Culture - Blood (09.16.20 @ 16:33)    Specimen Source: .Blood Blood-Venous    Culture Results:   No growth at 48 hours              MEDICATIONS  (STANDING):  acetaminophen   Tablet .. 975 milliGRAM(s) Oral every 8 hours  aspirin enteric coated 81 milliGRAM(s) Oral two times a day  ceFAZolin   IVPB 2000 milliGRAM(s) IV Intermittent every 8 hours  clopidogrel Tablet 75 milliGRAM(s) Oral daily  influenza   Vaccine 0.5 milliLiter(s) IntraMuscular once  lactated ringers. 1000 milliLiter(s) (100 mL/Hr) IV Continuous <Continuous>  pantoprazole    Tablet 40 milliGRAM(s) Oral before breakfast  polyethylene glycol 3350 17 Gram(s) Oral daily  senna 2 Tablet(s) Oral at bedtime  vancomycin  IVPB 1250 milliGRAM(s) IV Intermittent every 12 hours    MEDICATIONS  (PRN):  aluminum hydroxide/magnesium hydroxide/simethicone Suspension 30 milliLiter(s) Oral four times a day PRN Indigestion  bisacodyl Suppository 10 milliGRAM(s) Rectal daily PRN If no bowel movement by postoperative day #2  HYDROmorphone   Tablet 4 milliGRAM(s) Oral every 3 hours PRN Severe Pain (7 - 10)  HYDROmorphone  Injectable 0.5 milliGRAM(s) IV Push every 3 hours PRN breakthrough pain  magnesium hydroxide Suspension 30 milliLiter(s) Oral daily PRN Constipation  ondansetron Injectable 4 milliGRAM(s) IV Push every 6 hours PRN Nausea and/or Vomiting  oxyCODONE    IR 5 milliGRAM(s) Oral every 3 hours PRN Mild Pain (1 - 3)  oxyCODONE    IR 10 milliGRAM(s) Oral every 3 hours PRN Moderate Pain (4 - 6)    Vital Signs Last 24 Hrs  T(C): 36.6 (20 Sep 2020 04:13), Max: 36.9 (19 Sep 2020 19:56)  T(F): 97.8 (20 Sep 2020 04:13), Max: 98.4 (19 Sep 2020 19:56)  HR: 60 (20 Sep 2020 04:13) (60 - 68)  BP: 119/68 (20 Sep 2020 04:13) (110/69 - 123/74)  BP(mean): --  RR: 18 (20 Sep 2020 04:13) (17 - 19)  SpO2: 99% (20 Sep 2020 04:13) (96% - 99%)  Lying in bed in NAD, awake and alert  Physical exam: Right lower extremity- Knee immobilizer intact, opened for exam. The right knee prevena dressing is clean, dry, intact and functioning with no output in cannister. No drainage or discharge. No erythema is noted. No blistering. No ecchymosis. The calf is supple. No calf tenderness. Sensation to light touch is grossly intact distally. Motor function distally is 5/5. Extensor hallucis longus and flexor hallucis longus are intact. No foot drop. 2+ dorsalis pedis pulse. Capillary refill is less than 2 seconds. No cyanosis.    Primary Orthopedic Assessment:  • s/p RIGHT knee superficial I&D, POD#3    Secondary  Orthopedic Assessment(s):   • S/P right total knee arthroplasty, 8/25      Plan:   - Continue knee immobilizer and prevena vac dsg  • DVT prophylaxis as prescribed- Plavix/asa 81mg bid, including use of compression devices and ankle pumps  • Continue physical therapy  • Weightbearing as tolerated of the right lower extremity   • Incentive spirometry encouraged  • Pain control as clinically indicated  • ID following  - IV ABX  - Will need PICC for prolonged abx treatment  - Plan for home with home care for abx     SHA GUTIERREZ    [id number]    History: 60y Male is status post right knee superficial I&D (s/p right TKA), POD #3. Patient is doing well and is comfortable. The patient's pain is controlled using the prescribed pain medications. The patient is participating in physical therapy. He ambulates on his own. Denies nausea, vomiting, chest pain, shortness of breath, abdominal pain or dizziness. No new complaints.      Culture - Surgical Swab (09.18.20 @ 02:14)    -  Ampicillin/Sulbactam: S <=8/4    -  Cefazolin: S <=4    -  Clindamycin: S 0.5    -  Erythromycin: S 0.5    -  Gentamicin: S <=1 Should not be used as monotherapy    -  Oxacillin: S 0.5    -  Penicillin: R >8    -  RIF- Rifampin: S <=1 Should not be used as monotherapy    -  Tetra/Doxy: S <=1    -  Trimethoprim/Sulfamethoxazole: S <=0.5/9.5    -  Vancomycin: S 2    Specimen Source: .Surgical Swab right knee superficial wound    Culture Results:   Numerous Staphylococcus aureus    Organism Identification: Staphylococcus aureus    Organism: Staphylococcus aureus    Method Type: JIM    Culture - Blood (09.16.20 @ 19:31)    Specimen Source: .Blood Blood    Culture Results:   No growth at 48 hours    Culture - Blood (09.16.20 @ 16:33)    Specimen Source: .Blood Blood-Venous    Culture Results:   No growth at 48 hours              MEDICATIONS  (STANDING):  acetaminophen   Tablet .. 975 milliGRAM(s) Oral every 8 hours  aspirin enteric coated 81 milliGRAM(s) Oral two times a day  ceFAZolin   IVPB 2000 milliGRAM(s) IV Intermittent every 8 hours  clopidogrel Tablet 75 milliGRAM(s) Oral daily  influenza   Vaccine 0.5 milliLiter(s) IntraMuscular once  lactated ringers. 1000 milliLiter(s) (100 mL/Hr) IV Continuous <Continuous>  pantoprazole    Tablet 40 milliGRAM(s) Oral before breakfast  polyethylene glycol 3350 17 Gram(s) Oral daily  senna 2 Tablet(s) Oral at bedtime  vancomycin  IVPB 1250 milliGRAM(s) IV Intermittent every 12 hours    MEDICATIONS  (PRN):  aluminum hydroxide/magnesium hydroxide/simethicone Suspension 30 milliLiter(s) Oral four times a day PRN Indigestion  bisacodyl Suppository 10 milliGRAM(s) Rectal daily PRN If no bowel movement by postoperative day #2  HYDROmorphone   Tablet 4 milliGRAM(s) Oral every 3 hours PRN Severe Pain (7 - 10)  HYDROmorphone  Injectable 0.5 milliGRAM(s) IV Push every 3 hours PRN breakthrough pain  magnesium hydroxide Suspension 30 milliLiter(s) Oral daily PRN Constipation  ondansetron Injectable 4 milliGRAM(s) IV Push every 6 hours PRN Nausea and/or Vomiting  oxyCODONE    IR 5 milliGRAM(s) Oral every 3 hours PRN Mild Pain (1 - 3)  oxyCODONE    IR 10 milliGRAM(s) Oral every 3 hours PRN Moderate Pain (4 - 6)    Vital Signs Last 24 Hrs  T(C): 36.6 (20 Sep 2020 04:13), Max: 36.9 (19 Sep 2020 19:56)  T(F): 97.8 (20 Sep 2020 04:13), Max: 98.4 (19 Sep 2020 19:56)  HR: 60 (20 Sep 2020 04:13) (60 - 68)  BP: 119/68 (20 Sep 2020 04:13) (110/69 - 123/74)  BP(mean): --  RR: 18 (20 Sep 2020 04:13) (17 - 19)  SpO2: 99% (20 Sep 2020 04:13) (96% - 99%)  Lying in bed in NAD, awake and alert  Physical exam: Right lower extremity- Knee immobilizer intact, opened for exam. The right knee prevena dressing is clean, dry, intact and functioning with no output in cannister. No drainage or discharge. No erythema is noted. No blistering. No ecchymosis. The calf is supple. No calf tenderness. Sensation to light touch is grossly intact distally. Motor function distally is 5/5. Extensor hallucis longus and flexor hallucis longus are intact. No foot drop. 2+ dorsalis pedis pulse. Capillary refill is less than 2 seconds. No cyanosis.    Primary Orthopedic Assessment:  • s/p RIGHT knee superficial I&D, POD#3    Secondary  Orthopedic Assessment(s):   • S/P right total knee arthroplasty, 8/25      Plan:   - Continue knee immobilizer and prevena vac dsg  • DVT prophylaxis as prescribed- Plavix/asa 81mg bid, including use of compression devices and ankle pumps  • Continue physical therapy  • Weightbearing as tolerated of the right lower extremity   • Incentive spirometry encouraged  • Pain control as clinically indicated  • ID following  - IV ABX  - Will need PICC for prolonged abx treatment- awaiting ID recommendations  - Plan for home with home care for abx

## 2020-09-21 ENCOUNTER — TRANSCRIPTION ENCOUNTER (OUTPATIENT)
Age: 60
End: 2020-09-21

## 2020-09-21 VITALS
HEART RATE: 60 BPM | DIASTOLIC BLOOD PRESSURE: 98 MMHG | RESPIRATION RATE: 19 BRPM | OXYGEN SATURATION: 99 % | SYSTOLIC BLOOD PRESSURE: 147 MMHG | TEMPERATURE: 98 F

## 2020-09-21 LAB
CULTURE RESULTS: SIGNIFICANT CHANGE UP
CULTURE RESULTS: SIGNIFICANT CHANGE UP
SPECIMEN SOURCE: SIGNIFICANT CHANGE UP
SPECIMEN SOURCE: SIGNIFICANT CHANGE UP

## 2020-09-21 PROCEDURE — 89051 BODY FLUID CELL COUNT: CPT

## 2020-09-21 PROCEDURE — 85610 PROTHROMBIN TIME: CPT

## 2020-09-21 PROCEDURE — 85652 RBC SED RATE AUTOMATED: CPT

## 2020-09-21 PROCEDURE — 99232 SBSQ HOSP IP/OBS MODERATE 35: CPT

## 2020-09-21 PROCEDURE — 80053 COMPREHEN METABOLIC PANEL: CPT

## 2020-09-21 PROCEDURE — 85027 COMPLETE CBC AUTOMATED: CPT

## 2020-09-21 PROCEDURE — 87040 BLOOD CULTURE FOR BACTERIA: CPT

## 2020-09-21 PROCEDURE — 86901 BLOOD TYPING SEROLOGIC RH(D): CPT

## 2020-09-21 PROCEDURE — 97167 OT EVAL HIGH COMPLEX 60 MIN: CPT

## 2020-09-21 PROCEDURE — 87186 SC STD MICRODIL/AGAR DIL: CPT

## 2020-09-21 PROCEDURE — U0003: CPT

## 2020-09-21 PROCEDURE — 87070 CULTURE OTHR SPECIMN AEROBIC: CPT

## 2020-09-21 PROCEDURE — 93005 ELECTROCARDIOGRAM TRACING: CPT

## 2020-09-21 PROCEDURE — 87075 CULTR BACTERIA EXCEPT BLOOD: CPT

## 2020-09-21 PROCEDURE — 80202 ASSAY OF VANCOMYCIN: CPT

## 2020-09-21 PROCEDURE — 86900 BLOOD TYPING SEROLOGIC ABO: CPT

## 2020-09-21 PROCEDURE — 97163 PT EVAL HIGH COMPLEX 45 MIN: CPT

## 2020-09-21 PROCEDURE — 86140 C-REACTIVE PROTEIN: CPT

## 2020-09-21 PROCEDURE — 36415 COLL VENOUS BLD VENIPUNCTURE: CPT

## 2020-09-21 PROCEDURE — 80048 BASIC METABOLIC PNL TOTAL CA: CPT

## 2020-09-21 PROCEDURE — 99285 EMERGENCY DEPT VISIT HI MDM: CPT

## 2020-09-21 PROCEDURE — 86769 SARS-COV-2 COVID-19 ANTIBODY: CPT

## 2020-09-21 PROCEDURE — 86850 RBC ANTIBODY SCREEN: CPT

## 2020-09-21 RX ORDER — CEFAZOLIN SODIUM 1 G
2 VIAL (EA) INJECTION
Qty: 0 | Refills: 0 | DISCHARGE
Start: 2020-09-21

## 2020-09-21 RX ORDER — SENNOSIDES/DOCUSATE SODIUM 8.6MG-50MG
2 TABLET ORAL
Qty: 20 | Refills: 0
Start: 2020-09-21

## 2020-09-21 RX ORDER — ASPIRIN/CALCIUM CARB/MAGNESIUM 324 MG
1 TABLET ORAL
Qty: 76 | Refills: 0
Start: 2020-09-21 | End: 2020-10-28

## 2020-09-21 RX ORDER — OXYCODONE HYDROCHLORIDE 5 MG/1
1 TABLET ORAL
Qty: 40 | Refills: 0
Start: 2020-09-21

## 2020-09-21 RX ORDER — OMEPRAZOLE 10 MG/1
1 CAPSULE, DELAYED RELEASE ORAL
Qty: 38 | Refills: 0
Start: 2020-09-21 | End: 2020-10-28

## 2020-09-21 RX ADMIN — Medication 81 MILLIGRAM(S): at 05:05

## 2020-09-21 RX ADMIN — Medication 100 MILLIGRAM(S): at 05:04

## 2020-09-21 RX ADMIN — OXYCODONE HYDROCHLORIDE 10 MILLIGRAM(S): 5 TABLET ORAL at 05:05

## 2020-09-21 RX ADMIN — Medication 975 MILLIGRAM(S): at 05:05

## 2020-09-21 RX ADMIN — OXYCODONE HYDROCHLORIDE 10 MILLIGRAM(S): 5 TABLET ORAL at 05:50

## 2020-09-21 RX ADMIN — PANTOPRAZOLE SODIUM 40 MILLIGRAM(S): 20 TABLET, DELAYED RELEASE ORAL at 05:05

## 2020-09-21 RX ADMIN — Medication 975 MILLIGRAM(S): at 05:50

## 2020-09-21 NOTE — PROGRESS NOTE ADULT - ASSESSMENT
60y  Male  with h/o CAD s/p Stents, HLD, recent Right total knee arthroplasty on 8/25/20. Patient sent to the [facility] ER for possible Rt knee septic arthritis. Patient had redness over surgical site and some discomfort. Denies fever or chills. Reported minimal difficulty walking. In the office synovial fluid was sampled and he was sent to the ER. In the ER patient is afebrile, no leukocytosis.      Rt knee incision site infection   s/p Washout 9/17/20  s/p Right total knee arthroplasty on 8/25/20  CAD      - Blood cultures no growth  - COVID 19 PCR negative   - XR - rt knee from 8/25 reviewed   - ESR 37  - CRP 2.15  - Synovial fluid analysis not consistent with prosthetic joint infection   - Synovial fluid culture no growth   - OR cultures MSSA   - Continue Cefazolin 2gm IV q 8hours  - D/C Vancomycin   - Midline ordered and placed  - Plan for antibiotics till 10/15/20  - Will need weekly CBC and CMP faxed to [phone]  - Appointment with us in 7 to 10 days - [phone]  - Script sent   - Trend Fever  - Trend Leukocytosis      Will Follow

## 2020-09-21 NOTE — PROGRESS NOTE ADULT - ASSESSMENT
60 yo M Hx of HLD, CAD s/p PCI stents x9,last one 3 yrs ago , Hx hiatal hernia  with recent Rt knee replacement in 08/2020 presented today to Dr Qureshi's office with c.o rt knee pain, swelling, redness and discharge from the wound. He underwent arthrocentesis and has been admitted for further care. He is being planned for Rt knee Washout in OR in am , we were consulted for pre-op risk stratification, He denies any chest pain, sob, cough, fever. He has been very active before his Surgery, was able to bike for miles , able to work out without any chest pain , sob, palpitations, non-smoker, was seen by Dr Wong his Cardiologist in early Aug and Nuclear stress test was negative for ischemia per pt.     # Rt prosthetic knee surgical wound infection - synovial fluid cultures/  OR surgical swab cultures - staph aureus + , MSSA , Vanco d/jaja , Antibiotics per ID , mid line placement pending   bowel regimen  Prevena /DVT px per ortho   pain control     # acute blood loss anemia - 2/2 post operative state - HD stable   # Postoperative nausea - zofran Prn, reglan monitor  - resolved   # CAD s/p stents - on asa, plavix , hx of bradycardia - BB d/jaja on last hospitalization   # HPL - not on statins   # DVT prophylaxis  - as per ortho protocol- on ASA / Plavix   Opioid induced constipation  prophylaxis - bowel regimen  , will change Senna to QHS .      Medically stable to d/c once line placed and Home  Abx arranged    d/w patient / nurse / ortho PA .

## 2020-09-21 NOTE — PROCEDURE NOTE - NSNUMATTEMPT_GEN_A_CORE
Patient given copy of dc instructions and script(s). Patient verbalized understanding of instructions and script (s). Patient given a current medication reconciliation form and verbalized understanding of their medications. Patient verbalized understanding of the importance of discussing medications with  his or her physician or clinic they will be following up with. Patient alert and oriented and in no acute distress. Patient discharged home ambulatory with self and family. 1

## 2020-09-21 NOTE — DISCHARGE NOTE NURSING/CASE MANAGEMENT/SOCIAL WORK - NSDCFUADDAPPT_GEN_ALL_CORE_FT
Please follow with ID within a wk.   Dr Osman Ritchie  [address] [address]  Phone: [phone]

## 2020-09-21 NOTE — PROGRESS NOTE ADULT - PROVIDER SPECIALTY LIST ADULT
Anesthesia
Hospitalist
Infectious Disease
Orthopedics

## 2020-09-21 NOTE — PROGRESS NOTE ADULT - SUBJECTIVE AND OBJECTIVE BOX
[facility]  INFECTIOUS DISEASES AND INTERNAL MEDICINE at [place]  =======================================================  Alan Diaz MD  Diplomates American Board of Internal Medicine and Infectious Diseases  Tel: [phone]      Fax: [phone]  =======================================================    SHA GUTIERREZ [id number]    Follow up: surgical site infection     No fever or chills    No complaints       Allergies:  No Known Allergies      REVIEW OF SYSTEMS:  CONSTITUTIONAL:  No Fever or chills  HEENT:  No diplopia or blurred vision.  No earache, sore throat or runny nose.  CARDIOVASCULAR:  No pressure, squeezing, strangling, tightness, heaviness or aching about the chest, neck, axilla or epigastrium.  RESPIRATORY:  No cough, shortness of breath  GASTROINTESTINAL:  No nausea, vomiting or diarrhea.  GENITOURINARY:  No dysuria, frequency or urgency.   MUSCULOSKELETAL:   no muscle pain. Rt knee tenderness   SKIN:  Rt knee redness  NEUROLOGIC:  No Headaches, seizures   PSYCHIATRIC:  No disorder of thought or mood.  ENDOCRINE:  No heat or cold intolerance  HEMATOLOGICAL:  No easy bruising or bleeding.       Physical Exam:  GEN: NAD, pleasant  HEENT: normocephalic and atraumatic. EOMI. PERRL.  Anicteric  NECK: Supple.   LUNGS: Clear to auscultation.  HEART: Regular rate and rhythm  ABDOMEN: Soft, nontender, and nondistended.  Positive bowel sounds.    : No CVA tenderness  EXTREMITIES: Without any edema.  MSK: Rt Knee swelling and redness   NEUROLOGIC: No Focal Deficits  PSYCHIATRIC: Appropriate affect .  SKIN: No Rash      Vitals:    T(F): 97.7 (21 Sep 2020 07:12), Max: 98.7 (20 Sep 2020 23:40)  HR: 60 (21 Sep 2020 07:12)  BP: 147/98 (21 Sep 2020 07:12)  RR: 19 (21 Sep 2020 07:12)  SpO2: 99% (21 Sep 2020 07:12) (96% - 99%)  temp max in last 48H T(F): , Max: 98.7 (09-20-20 @ 23:40)      Current Antibiotics:  ceFAZolin   IVPB 2000 milliGRAM(s) IV Intermittent every 8 hours      Other medications:  acetaminophen   Tablet .. 975 milliGRAM(s) Oral every 8 hours  aspirin enteric coated 81 milliGRAM(s) Oral two times a day  clopidogrel Tablet 75 milliGRAM(s) Oral daily  influenza   Vaccine 0.5 milliLiter(s) IntraMuscular once  lactated ringers. 1000 milliLiter(s) IV Continuous <Continuous>  pantoprazole    Tablet 40 milliGRAM(s) Oral before breakfast  polyethylene glycol 3350 17 Gram(s) Oral daily  senna 2 Tablet(s) Oral at bedtime        Labs:  Culture - Surgical Swab (collected 09-18-20 @ 02:14)  Source: .Surgical Swab right knee superficial wound  Organism: Staphylococcus aureus (09-19-20 @ 20:55)  Organism: Staphylococcus aureus (09-19-20 @ 20:55)    Sensitivities:      -  Ampicillin/Sulbactam: S <=8/4      -  Cefazolin: S <=4      -  Clindamycin: S 0.5      -  Erythromycin: S 0.5      -  Gentamicin: S <=1 Should not be used as monotherapy      -  Oxacillin: S 0.5      -  Penicillin: R >8      -  RIF- Rifampin: S <=1 Should not be used as monotherapy      -  Tetra/Doxy: S <=1      -  Trimethoprim/Sulfamethoxazole: S <=0.5/9.5      -  Vancomycin: S 2      Method Type: JIM    Culture - Other (collected 09-16-20 @ 22:34)  Source: Skin right knee wound  Final Report (09-19-20 @ 06:55):    Numerous Staphylococcus aureus  Organism: Staphylococcus aureus (09-19-20 @ 06:55)  Organism: Staphylococcus aureus (09-19-20 @ 06:55)    Sensitivities:      -  Ampicillin/Sulbactam: S <=8/4      -  Cefazolin: S <=4      -  Clindamycin: S 0.5      -  Erythromycin: I 1      -  Gentamicin: S <=1 Should not be used as monotherapy      -  Oxacillin: S 0.5      -  Penicillin: R >8      -  RIF- Rifampin: S <=1 Should not be used as monotherapy      -  Tetra/Doxy: S <=1      -  Trimethoprim/Sulfamethoxazole: S <=0.5/9.5      -  Vancomycin: S 1      Method Type: JIM    Culture - Blood (collected 09-16-20 @ 19:31)  Source: .Blood Blood    Culture - Blood (collected 09-16-20 @ 16:33)  Source: .Blood Blood-Venous    WBC Count: 6.03 K/uL (09-18-20 @ 05:46)  WBC Count: 10.05 K/uL (09-16-20 @ 16:29)    Creatinine, Serum: 0.76 mg/dL (09-18-20 @ 05:46)  Creatinine, Serum: 1.13 mg/dL (09-16-20 @ 16:29)    C-Reactive Protein, Serum: 2.15 mg/dL (09-16-20 @ 16:29)    Sedimentation Rate, Erythrocyte: 37 mm/hr (09-16-20 @ 16:29)    COVID-19 PCR: NotDetec (09-16-20 @ 15:51)      < from: Xray Knee 1 or 2 Views, Right (08.25.20 @ 11:10) >   EXAM:  KNEE-RIGHT                          PROCEDURE DATE:  08/25/2020      INTERPRETATION:  HISTORY: Postoperative  knee replacement.    Two views of the RIGHT knee are submitted.    Evaluation demonstrates the presence of a tricompartmental knee replacement with the femoral, tibial and patellar components in proper anatomic alignment. There is no fracture .  Impression:  Knee prosthetic components in proper anatomical alignment.    < end of copied text >

## 2020-09-21 NOTE — DISCHARGE NOTE NURSING/CASE MANAGEMENT/SOCIAL WORK - PATIENT PORTAL LINK FT
You can access the FollowMyHealth Patient Portal offered by [facility] by registering at the following website: [email or web address]. By joining [facility]’s FollowMyHealth portal, you will also be able to view your health information using other applications (apps) compatible with our system.

## 2020-09-21 NOTE — PROGRESS NOTE ADULT - SUBJECTIVE AND OBJECTIVE BOX
Patient seen and examined . Pain well controlled , wants to go Home     CC : R knee pain well controlled     HPI:  Patient familiar to our service presents to the ER for evaluation of right knee wound. Patient had Right total knee arthroplasty on 8/25/20. Patient states he was feeling better and was back to work and doing well. Noticed a few days ago his knee was causing him some discomfort and looked irritated. He states he just started wearing jeans and they seem to be rubbing against his knee. He states he noticed redness and swelling at the center of the incision. He said he kept trying to "squeeze it like a pimple" and tried to get any drainage out. He went to Dr Qureshi office today where he had his knee aspirated by Dr Qureshi. States he has not checked for any fever nor has felt symptomatic  (16 Sep 2020 17:13)      PAST MEDICAL & SURGICAL HISTORY:  Osteoarthritis of right knee    H/O heart artery stent    Hyperlipidemia    CAD (Coronary Artery Disease)    H/O total knee replacement, right    H/O hernia repair    Fracture, Femur  right    History of Back Surgery  fusion    s/p Angioplasty with Stent        MEDICATIONS  (STANDING):  acetaminophen   Tablet .. 975 milliGRAM(s) Oral every 8 hours  aspirin enteric coated 81 milliGRAM(s) Oral two times a day  ceFAZolin   IVPB 2000 milliGRAM(s) IV Intermittent every 8 hours  clopidogrel Tablet 75 milliGRAM(s) Oral daily  influenza   Vaccine 0.5 milliLiter(s) IntraMuscular once  lactated ringers. 1000 milliLiter(s) (100 mL/Hr) IV Continuous <Continuous>  pantoprazole    Tablet 40 milliGRAM(s) Oral before breakfast  polyethylene glycol 3350 17 Gram(s) Oral daily  senna 2 Tablet(s) Oral at bedtime    MEDICATIONS  (PRN):  aluminum hydroxide/magnesium hydroxide/simethicone Suspension 30 milliLiter(s) Oral four times a day PRN Indigestion  bisacodyl Suppository 10 milliGRAM(s) Rectal daily PRN If no bowel movement by postoperative day #2  HYDROmorphone   Tablet 4 milliGRAM(s) Oral every 3 hours PRN Severe Pain (7 - 10)  HYDROmorphone  Injectable 0.5 milliGRAM(s) IV Push every 3 hours PRN breakthrough pain  magnesium hydroxide Suspension 30 milliLiter(s) Oral daily PRN Constipation  ondansetron Injectable 4 milliGRAM(s) IV Push every 6 hours PRN Nausea and/or Vomiting  oxyCODONE    IR 5 milliGRAM(s) Oral every 3 hours PRN Mild Pain (1 - 3)  oxyCODONE    IR 10 milliGRAM(s) Oral every 3 hours PRN Moderate Pain (4 - 6)        REVIEW OF SYSTEMS:    R knee pain well controlled , all other systems are reviewed and are negative .     Vital Signs Last 24 Hrs  T(C): 36.5 (21 Sep 2020 07:12), Max: 37.1 (20 Sep 2020 23:40)  T(F): 97.7 (21 Sep 2020 07:12), Max: 98.7 (20 Sep 2020 23:40)  HR: 60 (21 Sep 2020 07:12) (60 - 70)  BP: 147/98 (21 Sep 2020 07:12) (120/73 - 147/98)  BP(mean): --  RR: 19 (21 Sep 2020 07:12) (18 - 20)  SpO2: 99% (21 Sep 2020 07:12) (96% - 99%)    PHYSICAL EXAM:    GENERAL: NAD, well-groomed, well-developed  HEAD:  Atraumatic, Normocephalic  EYES: EOMI, PERRLA, conjunctiva and sclera clear  NECK: Supple, No JVD, Normal thyroid  NERVOUS SYSTEM:  Alert & Oriented X3, no focal deficit  CHEST/LUNG: CTA b/l ,  no  rales, rhonchi, wheezing, or rubs  HEART: Regular rate and rhythm; No murmurs, rubs, or gallops  ABDOMEN: Soft, Nontender, Nondistended; Bowel sounds present  EXTREMITIES:  2+ Peripheral Pulses, No clubbing, cyanosis, or edema, R knee Prevena / Immobilizer +   LYMPH: No lymphadenopathy noted  SKIN: No rashes or lesions

## 2020-09-22 LAB
CULTURE RESULTS: SIGNIFICANT CHANGE UP
ORGANISM # SPEC MICROSCOPIC CNT: SIGNIFICANT CHANGE UP
ORGANISM # SPEC MICROSCOPIC CNT: SIGNIFICANT CHANGE UP
SPECIMEN SOURCE: SIGNIFICANT CHANGE UP

## 2020-09-25 ENCOUNTER — APPOINTMENT (OUTPATIENT)
Dept: ORTHOPEDIC SURGERY | Facility: CLINIC | Age: 60
End: 2020-09-25
Payer: MEDICARE

## 2020-09-25 VITALS
BODY MASS INDEX: 30.66 KG/M2 | DIASTOLIC BLOOD PRESSURE: 89 MMHG | HEIGHT: 69 IN | SYSTOLIC BLOOD PRESSURE: 128 MMHG | WEIGHT: 207 LBS | HEART RATE: 68 BPM

## 2020-09-25 PROCEDURE — 99024 POSTOP FOLLOW-UP VISIT: CPT

## 2020-09-25 NOTE — END OF VISIT
[FreeTextEntry3] : I, Sam Hawthorne, acted solely as a scribe for Dr. Mateusz Qureshi on this date 09/25/2020.

## 2020-09-25 NOTE — HISTORY OF PRESENT ILLNESS
[Procedure: ___] : status post [unfilled] [___ Weeks Post Op] : [unfilled] weeks post op [Erythema] : erythematous [Swelling] : swollen [Neuro Intact] : an unremarkable neurological exam [Vascular Intact] : ~T peripheral vascular exam normal [Negative Ela's] : maneuvers demonstrated a negative Ela's sign [Wound Drained & Cultures Sent] : wound drained and cultures sent [Slow Progress] : is progressing slowly [No Sign of Infection] : is showing no signs of infection [Chills] : no chills [Constipation] : no constipation [Diarrhea] : no diarrhea [Dysuria] : no dysuria [Fever] : no fever [Nausea] : no nausea [Vomiting] : no vomiting [Discharge] : absent of discharge [Dehiscence] : not dehisced [de-identified] : S/P Right TKR, DOS: 8/25/20. [de-identified] : Pt is 4 weeks S/P right TKR. He has a known infection in his right knee. The patient is wearing a knee immobilizer and has a pump. He does the PICC line three times a day.  [de-identified] : Right knee exam shows that he is healing. He has a skin tear and blister. There is no fluid or discharge today.  [de-identified] : no xrays [de-identified] : We removed the pump today.  [de-identified] : The patient is doing well. He should continue to wear the knee immobilizer. He will continue to keep the incision covered. The patient will f/u next week.

## 2020-09-30 ENCOUNTER — APPOINTMENT (OUTPATIENT)
Dept: ORTHOPEDIC SURGERY | Facility: CLINIC | Age: 60
End: 2020-09-30
Payer: MEDICARE

## 2020-09-30 VITALS — WEIGHT: 207 LBS | HEIGHT: 69 IN | BODY MASS INDEX: 30.66 KG/M2

## 2020-09-30 PROCEDURE — 99024 POSTOP FOLLOW-UP VISIT: CPT

## 2020-09-30 NOTE — END OF VISIT
[FreeTextEntry3] : I, Sam Hawthorne, acted solely as a scribe for Dr. Mateusz Qureshi on this date 09/30/2020.

## 2020-09-30 NOTE — HISTORY OF PRESENT ILLNESS
[Procedure: ___] : status post [unfilled] [Negative Ela's] : maneuvers demonstrated a negative Ela's sign [Vascular Intact] : ~T peripheral vascular exam normal [Neuro Intact] : an unremarkable neurological exam [Slow Progress] : is progressing slowly [No Sign of Infection] : is showing no signs of infection [4] : the patient reports pain that is 4/10 in severity [Constipation] : constipation [Healed] : healed [Clean/Dry/Intact] : clean, dry and intact [Erythema] : erythematous [Swelling] : swollen [Adequate Pain Control] : has adequate pain control [Dysuria] : no dysuria [Chills] : no chills [Diarrhea] : no diarrhea [Fever] : no fever [Nausea] : no nausea [Dehiscence] : not dehisced [Vomiting] : no vomiting [Discharge] : absent of discharge [de-identified] : Pt is 5 weeks post-op. He is no longer wearing the knee immobilizer, but he is wearing an ace bandage. The patient also notes more stiffness. He c/o of an aching pain every day. He can't sit for more than 5 minutes without needing to readjust. He needs to take Percocet to help him sleep. Additionally, he takes Senna to help with his bowl movements. The patient inquires about exercise. He notes a decrease in his appetite.  [de-identified] : S/P Right TKR, DOS: 8/25/20. \par  [de-identified] : Right knee exam shows that he is healing. There is no fluid or discharge today.  [de-identified] : no xrays [de-identified] : We prescribed Percocet, senna, and outpatient PT. He can start to workout again. Pt understands the importance of prophylaxis for invasive dental procedures. F/u next week to remove staples. The patient should also f/u with the infectious disease doctor.

## 2020-10-05 ENCOUNTER — APPOINTMENT (OUTPATIENT)
Dept: ORTHOPEDIC SURGERY | Facility: CLINIC | Age: 60
End: 2020-10-05
Payer: MEDICARE

## 2020-10-05 PROCEDURE — 99024 POSTOP FOLLOW-UP VISIT: CPT

## 2020-10-05 NOTE — END OF VISIT
[FreeTextEntry3] : I, Sam Hawthorne, acted solely as a scribe for Dr. Mateusz Qureshi on this date 10/05/2020.

## 2020-10-05 NOTE — HISTORY OF PRESENT ILLNESS
[Procedure: ___] : status post [unfilled] [Healed] : healed [Neuro Intact] : an unremarkable neurological exam [Vascular Intact] : ~T peripheral vascular exam normal [Negative Ela's] : maneuvers demonstrated a negative Ela's sign [Doing Well] : is doing well [No Sign of Infection] : is showing no signs of infection [Adequate Pain Control] : has adequate pain control [Staples Removed] : staples were removed [Swelling] : swollen [4] : the patient reports pain that is 4/10 in severity [Constipation] : constipation [Chills] : no chills [Diarrhea] : no diarrhea [Dysuria] : no dysuria [Fever] : no fever [Nausea] : no nausea [Vomiting] : no vomiting [Erythema] : not erythematous [Discharge] : absent of discharge [Dehiscence] : not dehisced [de-identified] : s/p right knee aspiration for cell count and Synovasure testing. Right knee scar revision with excision of the old infected scar, evacuation of prepatellar hematoma, irrigation, debridement and revision wound closure. 9/17/2020 [de-identified] : Pt is 2.5 weeks post-op. His pain is controlled with Percocet 10mg. He is infusing IV Abx 3 times per day via PICC. He has not start PT yet. Pt states that he has a constant achy pain, especially in the posterior aspect of the knee. It hurts when he sits and his leg hangs. He also states that his pain is worse now than compared to the first surgery. [de-identified] : Examination of right knee demonstrate well-healing incision. Range of motion of 0-90 degree\par \par  [de-identified] : no xrays [de-identified] : The patient may start PT\par f/u in 2 weeks with xrays\par continue IV anx\par f/u with ID

## 2020-10-13 ENCOUNTER — APPOINTMENT (OUTPATIENT)
Dept: ORTHOPEDIC SURGERY | Facility: CLINIC | Age: 60
End: 2020-10-13
Payer: MEDICARE

## 2020-10-13 ENCOUNTER — APPOINTMENT (OUTPATIENT)
Dept: INTERNAL MEDICINE | Facility: CLINIC | Age: 60
End: 2020-10-13
Payer: MEDICARE

## 2020-10-13 VITALS
RESPIRATION RATE: 16 BRPM | DIASTOLIC BLOOD PRESSURE: 79 MMHG | SYSTOLIC BLOOD PRESSURE: 121 MMHG | TEMPERATURE: 97.6 F | BODY MASS INDEX: 30.66 KG/M2 | HEIGHT: 69 IN | WEIGHT: 207 LBS | HEART RATE: 86 BPM

## 2020-10-13 VITALS
HEIGHT: 69 IN | BODY MASS INDEX: 30.66 KG/M2 | SYSTOLIC BLOOD PRESSURE: 133 MMHG | HEART RATE: 60 BPM | DIASTOLIC BLOOD PRESSURE: 91 MMHG | WEIGHT: 207 LBS

## 2020-10-13 DIAGNOSIS — T81.49XA INFECTION FOLLOWING A PROCEDURE, OTHER SURGICAL SITE, INITIAL ENCOUNTER: ICD-10-CM

## 2020-10-13 PROCEDURE — 99214 OFFICE O/P EST MOD 30 MIN: CPT | Mod: 25

## 2020-10-13 PROCEDURE — 36415 COLL VENOUS BLD VENIPUNCTURE: CPT

## 2020-10-13 PROCEDURE — 99024 POSTOP FOLLOW-UP VISIT: CPT

## 2020-10-19 ENCOUNTER — APPOINTMENT (OUTPATIENT)
Dept: ORTHOPEDIC SURGERY | Facility: CLINIC | Age: 60
End: 2020-10-19
Payer: MEDICARE

## 2020-10-19 LAB
CRP SERPL-MCNC: 0.11 MG/DL
ERYTHROCYTE [SEDIMENTATION RATE] IN BLOOD BY WESTERGREN METHOD: 11 MM/HR

## 2020-10-19 PROCEDURE — 73562 X-RAY EXAM OF KNEE 3: CPT | Mod: RT

## 2020-10-19 PROCEDURE — 99024 POSTOP FOLLOW-UP VISIT: CPT

## 2020-10-19 PROCEDURE — 99072 ADDL SUPL MATRL&STAF TM PHE: CPT

## 2020-10-19 NOTE — HISTORY OF PRESENT ILLNESS
[Procedure: ___] : status post [unfilled] [Xray (Date:___)] : [unfilled] Xray -  [Clean/Dry/Intact] : clean, dry and intact [Healed] : healed [Erythema] : erythematous [Vascular Intact] : ~T peripheral vascular exam normal [Neuro Intact] : an unremarkable neurological exam [Negative Ela's] : maneuvers demonstrated a negative Ela's sign [Doing Well] : is doing well [No Sign of Infection] : is showing no signs of infection [Chills] : no chills [Diarrhea] : no diarrhea [Constipation] : no constipation [Nausea] : no nausea [Dysuria] : no dysuria [Fever] : no fever [Discharge] : absent of discharge [Vomiting] : no vomiting [Swelling] : not swollen [Dehiscence] : not dehisced [de-identified] : Pt is 5 weeks post-op. He notes some pain in the anterior aspect of the knee as well as achiness in the posterior aspect of the knee. The PICC line was removed on Friday. There is no discharge. He has difficulty sitting in the car for a long period of time. The patient also has difficulty sleeping through the night. He takes Percocet to help him sleep at night. He is unable to kneel on the knee. He is currently in PT. [de-identified] : Right knee exam shows healed incision with no sign of infection  [de-identified] : 3V xray of the right knee done in the office today and reviewed by Dr. Mateusz Qureshi demonstrates s/p implants in good positioning with no evidence of wear, loosening, or subsidence.  [de-identified] : s/p right knee aspiration for cell count and Synovasure testing. Right knee scar revision with excision of the old infected scar, evacuation of prepatellar hematoma, irrigation, debridement and revision wound closure. 9/17/2020 [de-identified] : He should continue to wrap his knee and keep pressure on it. He may shower and wear long pants. He should not put scarguard on the incision yet. Pt understands the importance of prophylaxis for invasive dental procedures. F/u in two weeks.

## 2020-10-19 NOTE — END OF VISIT
[FreeTextEntry3] : I, Sam Hawthorne, acted solely as a scribe for Dr. Mateusz Qureshi on this date 10/19/2020.

## 2020-10-23 ENCOUNTER — APPOINTMENT (OUTPATIENT)
Dept: CARDIOLOGY | Facility: CLINIC | Age: 60
End: 2020-10-23

## 2020-10-26 ENCOUNTER — APPOINTMENT (OUTPATIENT)
Dept: ORTHOPEDIC SURGERY | Facility: CLINIC | Age: 60
End: 2020-10-26
Payer: MEDICARE

## 2020-10-26 VITALS — TEMPERATURE: 97.6 F

## 2020-10-26 PROCEDURE — 99024 POSTOP FOLLOW-UP VISIT: CPT

## 2020-10-26 NOTE — HISTORY OF PRESENT ILLNESS
[2] : the patient reports pain that is 2/10 in severity [Doing Well] : is doing well [Adequate Pain Control] : has adequate pain control [Healed] : healed [Erythema] : erythematous [Chills] : no chills [Constipation] : no constipation [Diarrhea] : no diarrhea [Dysuria] : no dysuria [Fever] : no fever [Nausea] : no nausea [Vomiting] : no vomiting [Discharge] : absent of discharge [de-identified] : wound check [de-identified] : he noted redness of the incision and been always sore more then other area\par he has been off antibiotic\par no fever no drainage\par no chills\par he is in PT 3 times a week\par he notes stiffness [de-identified] : Right knee exam shows incision is intact without discharge or dehiscence\par local small mid erythema in the mid incision with delayed spot with suture. [de-identified] : no xray [de-identified] : +  suture abscess [de-identified] : We prescribed Keflex. F/u in 1 week

## 2020-10-26 NOTE — END OF VISIT
[FreeTextEntry3] : I, Sam Hawthorne, acted solely as a scribe for Dr. Mateusz Qureshi on this date 10/26/2020.

## 2020-10-28 ENCOUNTER — APPOINTMENT (OUTPATIENT)
Dept: ORTHOPEDIC SURGERY | Facility: CLINIC | Age: 60
End: 2020-10-28
Payer: MEDICARE

## 2020-10-28 VITALS
WEIGHT: 207 LBS | HEIGHT: 69 IN | SYSTOLIC BLOOD PRESSURE: 122 MMHG | DIASTOLIC BLOOD PRESSURE: 87 MMHG | HEART RATE: 72 BPM | BODY MASS INDEX: 30.66 KG/M2

## 2020-10-28 PROCEDURE — 99024 POSTOP FOLLOW-UP VISIT: CPT

## 2020-10-28 NOTE — HISTORY OF PRESENT ILLNESS
[2] : the patient reports pain that is 2/10 in severity [Healed] : healed [Erythema] : erythematous [Doing Well] : is doing well [Adequate Pain Control] : has adequate pain control [Sutures Removed] : sutures were removed [Chills] : no chills [Constipation] : no constipation [Diarrhea] : no diarrhea [Dysuria] : no dysuria [Fever] : no fever [Nausea] : no nausea [Vomiting] : no vomiting [Discharge] : absent of discharge [de-identified] : wound check [de-identified] : He notes a bump over the incision and expressed concern of an infection. He is currently taking the Keflex every 6 hours. He is having night time pain. He is in PT 3 times a week. He notes stiffness. [de-identified] : Right knee exam shows incision is intact without discharge or dehiscence\par local small mid erythema in the mid incision with delayed spot with suture.\par mild suture reaction [de-identified] : no xray [de-identified] : He should continue to take the Keflex. He will follow up next week.

## 2020-10-30 ENCOUNTER — APPOINTMENT (OUTPATIENT)
Dept: CARDIOLOGY | Facility: CLINIC | Age: 60
End: 2020-10-30
Payer: MEDICARE

## 2020-10-30 VITALS
SYSTOLIC BLOOD PRESSURE: 124 MMHG | HEIGHT: 69 IN | WEIGHT: 209 LBS | HEART RATE: 74 BPM | TEMPERATURE: 97.9 F | DIASTOLIC BLOOD PRESSURE: 70 MMHG | BODY MASS INDEX: 30.96 KG/M2 | RESPIRATION RATE: 16 BRPM

## 2020-10-30 DIAGNOSIS — T84.59XA INFECTION AND INFLAMMATORY REACTION DUE TO OTHER INTERNAL JOINT PROSTHESIS, INITIAL ENCOUNTER: ICD-10-CM

## 2020-10-30 DIAGNOSIS — A49.01 METHICILLIN SUSCEPTIBLE STAPHYLOCOCCUS AUREUS INFECTION, UNSPECIFIED SITE: ICD-10-CM

## 2020-10-30 DIAGNOSIS — Z96.659 INFECTION AND INFLAMMATORY REACTION DUE TO OTHER INTERNAL JOINT PROSTHESIS, INITIAL ENCOUNTER: ICD-10-CM

## 2020-10-30 PROCEDURE — 99214 OFFICE O/P EST MOD 30 MIN: CPT

## 2020-10-30 PROCEDURE — 99072 ADDL SUPL MATRL&STAF TM PHE: CPT

## 2020-10-30 PROCEDURE — 93000 ELECTROCARDIOGRAM COMPLETE: CPT

## 2020-10-30 RX ORDER — OXYCODONE AND ACETAMINOPHEN 5; 325 MG/1; MG/1
5-325 TABLET ORAL
Qty: 40 | Refills: 0 | Status: DISCONTINUED | COMMUNITY
Start: 2020-09-04 | End: 2020-10-30

## 2020-10-30 RX ORDER — ACETAMINOPHEN AND CODEINE 300; 30 MG/1; MG/1
300-30 TABLET ORAL
Qty: 21 | Refills: 0 | Status: DISCONTINUED | COMMUNITY
Start: 2020-05-21 | End: 2020-10-30

## 2020-10-30 RX ORDER — OMEPRAZOLE 20 MG/1
20 CAPSULE, DELAYED RELEASE ORAL
Qty: 42 | Refills: 0 | Status: DISCONTINUED | COMMUNITY
Start: 2020-08-26 | End: 2020-10-30

## 2020-10-30 RX ORDER — SENNOSIDES 8.6 MG TABLETS 8.6 MG/1
8.6 TABLET ORAL
Qty: 60 | Refills: 0 | Status: DISCONTINUED | COMMUNITY
Start: 2020-09-30 | End: 2020-10-30

## 2020-10-30 RX ORDER — OXYCODONE AND ACETAMINOPHEN 10; 325 MG/1; MG/1
10-325 TABLET ORAL
Qty: 21 | Refills: 0 | Status: DISCONTINUED | COMMUNITY
Start: 2020-05-28 | End: 2020-10-30

## 2020-10-30 RX ORDER — PREDNISONE 20 MG/1
20 TABLET ORAL
Qty: 10 | Refills: 0 | Status: DISCONTINUED | COMMUNITY
Start: 2020-08-03 | End: 2020-10-30

## 2020-10-30 RX ORDER — SENNOSIDES 8.6 MG TABLETS 8.6 MG/1
8.6 TABLET ORAL
Qty: 30 | Refills: 0 | Status: DISCONTINUED | COMMUNITY
Start: 2020-10-19 | End: 2020-10-30

## 2020-10-30 RX ORDER — CYCLOBENZAPRINE HYDROCHLORIDE 10 MG/1
10 TABLET, FILM COATED ORAL
Qty: 15 | Refills: 0 | Status: DISCONTINUED | COMMUNITY
Start: 2020-08-03 | End: 2020-10-30

## 2020-10-30 RX ORDER — AMOXICILLIN 500 MG/1
500 CAPSULE ORAL
Qty: 21 | Refills: 0 | Status: DISCONTINUED | COMMUNITY
Start: 2020-05-28 | End: 2020-10-30

## 2020-10-30 RX ORDER — CEFAZOLIN 10 G/1
10 INJECTION, POWDER, FOR SOLUTION INTRAVENOUS
Qty: 4 | Refills: 0 | Status: DISCONTINUED | COMMUNITY
Start: 2020-09-21 | End: 2020-10-30

## 2020-10-30 RX ORDER — OXYCODONE AND ACETAMINOPHEN 10; 325 MG/1; MG/1
10-325 TABLET ORAL
Qty: 30 | Refills: 0 | Status: DISCONTINUED | COMMUNITY
Start: 2020-09-21 | End: 2020-10-30

## 2020-11-03 NOTE — REASON FOR VISIT
[FreeTextEntry1] : Mr. Charles is a pleasant 60-year-old white male with a past medical history significant for hyperlipidemia as well as coronary artery disease status-post multivessel PCI, who presents for follow up evaluation.  \par

## 2020-11-03 NOTE — HISTORY OF PRESENT ILLNESS
[FreeTextEntry1] : From a cardiac standpoint, Mr. Charles denies exert chest pain, shortness of breath, palpitations, lightheadedness, or syncope.  He recently underwent total right knee replacement which unfortunately was complicated by surgical incision infection requiring intravenous antibiotics for the PICC line for several weeks.  He presents today feeling much better with only stiffness involving his right knee.  Currently undergoing physical therapy.

## 2020-11-03 NOTE — ASSESSMENT
[FreeTextEntry1] : 1.  EKG today reveals sinus rhythm at 74 bpm.  Normal intervals.  Qs in leads II, III, and aVF which are consistent with prior inferior wall MI consistent with complete total occlusion of his mid-right coronary artery. \par \par 2.  Coronary artery disease status-post multivessel stent insertion:  Clinically stable denying chest pain, shortness of breath, or other symptoms.  Patient advised to walk as much as right knee permits. \par \par 3.  Hypertension:  Blood pressure well controlled at this time on current medications.  Patient advised on low-salt and weight loss.  \par \par 4.  Hyperlipidemia:  Patient apparently not taking a statin up until a few days ago.  States his recent lipid profile revealed elevation in cholesterol.  Will resume statin therapy at this time and have him undergo fasting bloodwork in approximately 6-8 weeks.  A strict low-fat / low-cholesterol diet was discussed in detail.  If clinically stable, follow up office visit three months.  \par

## 2020-11-11 ENCOUNTER — APPOINTMENT (OUTPATIENT)
Dept: ORTHOPEDIC SURGERY | Facility: CLINIC | Age: 60
End: 2020-11-11
Payer: MEDICARE

## 2020-11-11 VITALS
HEART RATE: 60 BPM | BODY MASS INDEX: 30.96 KG/M2 | HEIGHT: 69 IN | DIASTOLIC BLOOD PRESSURE: 79 MMHG | WEIGHT: 209 LBS | SYSTOLIC BLOOD PRESSURE: 121 MMHG

## 2020-11-11 PROCEDURE — 99024 POSTOP FOLLOW-UP VISIT: CPT

## 2020-11-11 PROCEDURE — 73562 X-RAY EXAM OF KNEE 3: CPT | Mod: RT

## 2020-11-11 NOTE — END OF VISIT
[FreeTextEntry3] : I, Sam Hawthorne, acted solely as a scribe for Dr. Mateusz Qureshi on this date 11/11/2020.

## 2020-11-11 NOTE — HISTORY OF PRESENT ILLNESS
[3] : the patient reports pain that is 3/10 in severity [Healed] : healed [Erythema] : erythematous [Xray (Date:___)] : [unfilled] Xray -  [Doing Well] : is doing well [No Sign of Infection] : is showing no signs of infection [Adequate Pain Control] : has adequate pain control [Chills] : no chills [Constipation] : no constipation [Diarrhea] : no diarrhea [Dysuria] : no dysuria [Fever] : no fever [Nausea] : no nausea [Vomiting] : no vomiting [Discharge] : absent of discharge [de-identified] : S/P Right knee aspiration for cell count and Synovasure testing.  DOS 9/17/20\par  Right knee scar revision with excision of the old infected scar, evacuation of prepatellar hematoma, irrigation, debridement and revision wound closure.\par  Placement of a negative pressure incisional VAC, Prevena less than 50 cm.\par  [de-identified] : He notes that the bump over the incision is still present. He finished taking the Keflex. He is having night time pain. Pt is taking one Percocet at night for night pain. He also notes having pain after driving more a long period of time. He is in PT 3 times a week. He notes stiffness and tenderness [de-identified] : Right knee exam shows incision is intact without discharge or dehiscence\par local small mid erythema in the mid incision with delayed spot with suture.\par mild suture reaction. [de-identified] : 3V xray of the right knee done in the office today and reviewed by Dr. Mateusz Qureshi demonstrates s/p implants in good positioning with no evidence of wear, loosening, or subsidence.  [de-identified] : He should continue to do PT. We prescribed more Percocet today. Pt understands the importance of prophylaxis for invasive dental procedures. F/u in 3-4 weeks.

## 2020-11-24 ENCOUNTER — TRANSCRIPTION ENCOUNTER (OUTPATIENT)
Age: 60
End: 2020-11-24

## 2020-11-25 NOTE — PHARMACOTHERAPY INTERVENTION NOTE - NSPHARMCOMMASP
Cont ASA/statin, ARB, spironolactone  Echo showed diastolic dysfunction with normal systolic function   ASP - Dose optimization/Non-Renal dose adjustment

## 2020-11-30 ENCOUNTER — APPOINTMENT (OUTPATIENT)
Dept: ORTHOPEDIC SURGERY | Facility: CLINIC | Age: 60
End: 2020-11-30
Payer: MEDICARE

## 2020-11-30 PROCEDURE — 99213 OFFICE O/P EST LOW 20 MIN: CPT

## 2020-11-30 PROCEDURE — 99072 ADDL SUPL MATRL&STAF TM PHE: CPT

## 2020-11-30 NOTE — HISTORY OF PRESENT ILLNESS
[5] : the patient reports pain that is 5/10 in severity [Healed] : healed [Erythema] : erythematous [Swelling] : swollen [Slow Progress] : is progressing slowly [No Sign of Infection] : is showing no signs of infection [Adequate Pain Control] : has adequate pain control [Sutures Removed] : sutures were removed [Chills] : no chills [Constipation] : no constipation [Diarrhea] : no diarrhea [Dysuria] : no dysuria [Fever] : no fever [Nausea] : no nausea [Vomiting] : no vomiting [de-identified] : s/p right knee scar revision with excision of the old infected scar, evacuation of prepatellar hematoma, irrigation, debridement and revision of wound closure. 9/17/2020 [de-identified] : The patient is experiencing pain on the incision. He notes bleeding towards the distal aspect of the incision. Additionally, he notes a bump on the superior and medial aspect of the knee which is tender. Pt is experiencing significant night pain. He is currently in PT.  [de-identified] : Right knee exam shows no sign of infection. Mild erythema. At the medial end there is no subcutaneous fluid collection. There is also delayed healing from PDS suture in the distal aspect of the incision.  [de-identified] : no xrays [de-identified] : The patient is experiencing a suture reaction in the distal end of the incision. We removed sutures from the distal aspect of the incision. He does not appear to have an infection, but we prescribed Keflex today to be proactive. He understands that he needs to take the Keflex 4 times a day for 10 days. Additionally, we prescribed Percocet for pain management. F/u next week.

## 2020-11-30 NOTE — END OF VISIT
[FreeTextEntry3] : I, Sam Hawthorne, acted solely as a scribe for Dr. Mateusz Qureshi on this date 11/30/2020.

## 2020-12-07 ENCOUNTER — APPOINTMENT (OUTPATIENT)
Dept: ORTHOPEDIC SURGERY | Facility: CLINIC | Age: 60
End: 2020-12-07

## 2020-12-07 ENCOUNTER — TRANSCRIPTION ENCOUNTER (OUTPATIENT)
Age: 60
End: 2020-12-07

## 2020-12-16 ENCOUNTER — APPOINTMENT (OUTPATIENT)
Dept: ORTHOPEDIC SURGERY | Facility: CLINIC | Age: 60
End: 2020-12-16
Payer: MEDICARE

## 2020-12-16 VITALS
HEART RATE: 68 BPM | WEIGHT: 209 LBS | DIASTOLIC BLOOD PRESSURE: 80 MMHG | SYSTOLIC BLOOD PRESSURE: 118 MMHG | HEIGHT: 69 IN | BODY MASS INDEX: 30.96 KG/M2

## 2020-12-16 PROCEDURE — 73562 X-RAY EXAM OF KNEE 3: CPT | Mod: RT

## 2020-12-16 PROCEDURE — 99072 ADDL SUPL MATRL&STAF TM PHE: CPT

## 2020-12-16 PROCEDURE — 99213 OFFICE O/P EST LOW 20 MIN: CPT

## 2020-12-16 NOTE — END OF VISIT
[FreeTextEntry3] : I, Sam Hawthorne, acted solely as a scribe for Dr. Mateusz Qureshi on this date 12/16/2020.

## 2020-12-16 NOTE — HISTORY OF PRESENT ILLNESS
[Procedure: ___] : status post [unfilled] [Slow Progress] : is progressing slowly [5] : the patient reports pain that is 5/10 in severity [Xray (Date:___)] : [unfilled] Xray -  [Chills] : no chills [Constipation] : no constipation [Dysuria] : no dysuria [Fever] : no fever [Nausea] : no nausea [Vomiting] : no vomiting [de-identified] : s/p right knee scar revision with excision of the old infected scar, evacuation of prepatellar hematoma, irrigation, debridement and revision of wound closure. 9/17/2020. \par  [de-identified] : Pt is 3 M from the surgery. He notes swelling in the back of the knee. He was concerned about the swelling. The physical therapist advised him to see MD. He stopped PT treatment. Pain is moderate. [de-identified] : His incision is healed there is no active signs of infection.  He does have a moderate effusion.  His range of motion today is 0 to 115 degrees.  There is some retained PDS suture that was removed today but otherwise incisions well-healed with no drainage [de-identified] : X-rays of the right knee show a right total knee arthroplasty in good position no sign of wear or loosening or subsidence [de-identified] : The patient is making reasonable progress with his right total knee following a superficial wound infection there is no sign of active or persistent infection at this point.  I removed some of the retained PDS suture I will see him in 6 weeks [de-identified] : Follow-up 6 weeks

## 2020-12-21 ENCOUNTER — TRANSCRIPTION ENCOUNTER (OUTPATIENT)
Age: 60
End: 2020-12-21

## 2021-01-05 ENCOUNTER — TRANSCRIPTION ENCOUNTER (OUTPATIENT)
Age: 61
End: 2021-01-05

## 2021-01-05 ENCOUNTER — APPOINTMENT (OUTPATIENT)
Dept: CARDIOLOGY | Facility: CLINIC | Age: 61
End: 2021-01-05

## 2021-01-29 ENCOUNTER — APPOINTMENT (OUTPATIENT)
Dept: ORTHOPEDIC SURGERY | Facility: CLINIC | Age: 61
End: 2021-01-29
Payer: MEDICARE

## 2021-01-29 VITALS
BODY MASS INDEX: 30.96 KG/M2 | HEIGHT: 69 IN | HEART RATE: 75 BPM | WEIGHT: 209 LBS | SYSTOLIC BLOOD PRESSURE: 129 MMHG | DIASTOLIC BLOOD PRESSURE: 75 MMHG

## 2021-01-29 DIAGNOSIS — Z96.651 PRESENCE OF RIGHT ARTIFICIAL KNEE JOINT: ICD-10-CM

## 2021-01-29 DIAGNOSIS — Z96.651 AFTERCARE FOLLOWING JOINT REPLACEMENT SURGERY: ICD-10-CM

## 2021-01-29 DIAGNOSIS — Z47.1 AFTERCARE FOLLOWING JOINT REPLACEMENT SURGERY: ICD-10-CM

## 2021-01-29 PROCEDURE — 73562 X-RAY EXAM OF KNEE 3: CPT | Mod: RT

## 2021-01-29 PROCEDURE — 99072 ADDL SUPL MATRL&STAF TM PHE: CPT

## 2021-01-29 PROCEDURE — 99213 OFFICE O/P EST LOW 20 MIN: CPT

## 2021-01-29 NOTE — PHYSICAL EXAM
[LE] : Sensory: Intact in bilateral lower extremities [ALL] : dorsalis pedis, posterior tibial, femoral, popliteal, and radial 2+ and symmetric bilaterally [Normal] : Alert and in no acute distress [Poor Appearance] : well-appearing [de-identified] : GENERAL APPEARANCE:   Well nourished and hydrated, pleasant, alert, and oriented x 4.  \par CARDIOVASCULAR:   No apparent abnormalities.  No lower leg edema.  No varicosities.  Pedal pulses are palpable.\par RESPIRATORY: Breathe sound clear and audible in all lobes. No wheezing, No accessory muscle use.\par NEUROLOGIC:   Sensation is normal, no muscle weakness in the upper or lower extremities.\par DERMATOLOGIC:   No apparent skin lesions, moist, warm, no rash.\par SPINE:   Cervical spine appears normal and moves freely; thoracic spine appears normal and moves freely; lumbosacral spine appears normal and moves freely, normal, nontender.\par MUSCULOSKELETAL:   Hands, wrists, and elbows are normal and move freely, shoulders are normal and move freely.  [de-identified] : His incision is healed there is no active signs of infection. He does have a moderate effusion. His range of motion today is 0 to 115 degrees. There is some retained PDS suture that was removed today but otherwise incisions well-healed with no drainage. \par \par  [de-identified] : X-rays of the right knee show a right total knee arthroplasty in good position no sign of wear or loosening or subsidence.

## 2021-01-29 NOTE — REASON FOR VISIT
[Follow-Up Visit] : a follow-up visit for [Other: ____] : [unfilled] [FreeTextEntry2] : s/p right knee scar revision with excision of the old infected scar, evacuation of prepatellar hematoma, irrigation, debridement and revision of wound closure. 9/17/2020. \par

## 2021-01-29 NOTE — DISCUSSION/SUMMARY
[de-identified] : 61 y/o M s/p right TKA 9/17/2020. The pt is doing well. He notes having some mild pain, particularly at night. He describes the pain as achy. We prescribed more Oxycodone; however, this will be the last time we prescribed Oxycodone. He agreed that this will be the last time we prescribed Oxycodone. We reassured the pt that his hardware is in good positioning with no evidence of wear, loosening, or subsidence. He should continue to do low impact exercises. Pt understands the importance of prophylaxis for invasive dental procedures. F/u with us a year from surgery. \par \par \par The patient is a 60 year individual with end stage arthritis of their right knee joint. Based upon the patient's continued symptoms and failure to respond to conservative treatment, pt successfully completed right total knee arthroplasty. A long discussion took place with the patient describing what a total joint replacement is and what the procedure would entail. A total knee arthroplasty model, similar to the implant that was used during the operation, was utilized to demonstrate and to discuss the various bearing surfaces of the implants. The hospitalization and post-operative care and rehabilitation were also discussed. The use of perioperative antibiotics and DVT prophylaxis were discussed. The risk, benefits and alternatives to a surgical intervention were discussed at length with the patient. The patient was also advised of risks related to the medical comorbidities, elevated body mass index (BMI), and smoking where applicable. We discussed how to reduce modifiable risk factors and encouraged smoking cessation were applicable.. A lengthy discussion took place to review the most common complications including but not limited to: deep vein thrombosis, pulmonary embolus, heart attack, stroke, infection, wound breakdown, numbness, damage to nerves, tendon, muscles, arteries or other blood vessels, death and other possible complications from anesthesia. The patient was told that we will take steps to minimize these risks by using sterile technique, antibiotics and DVT prophylaxis when appropriate and follow the patient postoperatively in the office setting to monitor progress. The possibility of recurrent pain, no improvement in pain and actual worsening of pain were also discussed with the patient.\par The discharge plan of care focused on the patient going home following surgery. The patient was encouraged to make the necessary arrangements to have someone stay with them when they are discharged home. Following discharge, a home care nurse was to the patient. The home care nurse would open the patient’s home care case and request home physical therapy services. Home physical therapy was to commence following discharge provided it was appropriate and covered by the health insurance benefit plan. \par The benefits of surgery were discussed with the patient including the potential for improving his current clinical condition through operative intervention. Alternatives to surgical intervention including continued conservative management were also discussed in detail. All questions were answered to the satisfaction of the patient. The treatment plan of care, as well as a model of a total knee arthroplasty equivalent to the one that will be used for their total joint replacement, was shared with the patient. The patient agreed to the plan of care as well as the use of implants in their total joint replacement.

## 2021-01-29 NOTE — HISTORY OF PRESENT ILLNESS
[Pain Location] : pain [Improving] : improving [Knee Flexion] : worsened with knee flexion [Rest] : relieved by rest [2] : a current pain level of 2/10 [4] : a maximum pain level of 4/10 [de-identified] : 61 y/o M s/p right TKA 9/17/2020. He is 4.5 M from evacuation of prepatellar hematoma, irrigation, debridement and revision of wound closure. He has completed PT. He is walking well without using a cane. Pt notes pain when he sleeps. The sutures are still bothering him. He has no fever or chills. There is no drainage\par  [de-identified] : kneeling

## 2021-01-29 NOTE — END OF VISIT
[FreeTextEntry3] : I, Sam Hawthorne, acted solely as a scribe for Dr. Mateusz Qureshi on this date 01/29/2021.

## 2021-02-05 LAB
ACID FAST STN FLD: NORMAL
B PERT IGG+IGM PNL SER: ABNORMAL
BACTERIA FLD CULT: NORMAL
COLOR FLD: NORMAL
EOSINOPHIL # FLD MANUAL: 0 %
FLUID INTAKE SUBSTANCE CLASS: NORMAL
LYMPHOCYTES # FLD MANUAL: 16 %
MESOTHL CELL NFR FLD: 0 %
MONOS+MACROS NFR FLD MANUAL: 12 %
NEUTS SEG # FLD MANUAL: 72 %
NRBC # FLD: 0
RBC # FLD MANUAL: ABNORMAL /UL
TOTAL CELLS COUNTED FLD: 496 /UL
TUBE TYPE: NORMAL
UNIDENT CELLS NFR FLD MANUAL: 0 %
VARIANT LYMPHS # FLD MANUAL: 0 %

## 2021-02-11 NOTE — PROCEDURE NOTE - ADDITIONAL PROCEDURE DETAILS
Is This A New Presentation, Or A Follow-Up?: Skin Lesion 18G, 10cm, circ=29cm, Bard power glide midline, good flush with NS, blood draw back. Tolerated procedure well.  Left basilic vein

## 2021-08-26 ENCOUNTER — TRANSCRIPTION ENCOUNTER (OUTPATIENT)
Age: 61
End: 2021-08-26

## 2021-08-30 ENCOUNTER — NON-APPOINTMENT (OUTPATIENT)
Age: 61
End: 2021-08-30

## 2021-10-21 ENCOUNTER — TRANSCRIPTION ENCOUNTER (OUTPATIENT)
Age: 61
End: 2021-10-21

## 2021-12-14 ENCOUNTER — RX RENEWAL (OUTPATIENT)
Age: 61
End: 2021-12-14

## 2021-12-27 ENCOUNTER — TRANSCRIPTION ENCOUNTER (OUTPATIENT)
Age: 61
End: 2021-12-27

## 2022-01-05 NOTE — ED STATDOCS - DISCUSSED CLINICAL AND RADIOLOGICAL FINDINGS WITH, MDM
Please return with new or worsening symptoms.  Symptomatic over-the-counter treatment as discussed.  Drink plenty of water.  Ambulate frequently.  Quarantine for 10 days.   patient

## 2022-01-11 ENCOUNTER — APPOINTMENT (OUTPATIENT)
Dept: CARDIOLOGY | Facility: CLINIC | Age: 62
End: 2022-01-11

## 2022-02-08 ENCOUNTER — TRANSCRIPTION ENCOUNTER (OUTPATIENT)
Age: 62
End: 2022-02-08

## 2022-02-23 ENCOUNTER — TRANSCRIPTION ENCOUNTER (OUTPATIENT)
Age: 62
End: 2022-02-23

## 2022-07-21 ENCOUNTER — RX RENEWAL (OUTPATIENT)
Age: 62
End: 2022-07-21

## 2022-08-12 ENCOUNTER — APPOINTMENT (OUTPATIENT)
Dept: CARDIOLOGY | Facility: CLINIC | Age: 62
End: 2022-08-12

## 2022-08-12 ENCOUNTER — INPATIENT (INPATIENT)
Facility: HOSPITAL | Age: 62
LOS: 3 days | Discharge: HOME | DRG: 247 | End: 2022-08-16
Attending: INTERNAL MEDICINE | Admitting: INTERNAL MEDICINE
Payer: MEDICARE

## 2022-08-12 ENCOUNTER — NON-APPOINTMENT (OUTPATIENT)
Age: 62
End: 2022-08-12

## 2022-08-12 VITALS
DIASTOLIC BLOOD PRESSURE: 70 MMHG | BODY MASS INDEX: 31.1 KG/M2 | RESPIRATION RATE: 16 BRPM | WEIGHT: 210 LBS | HEIGHT: 69 IN | SYSTOLIC BLOOD PRESSURE: 100 MMHG | HEART RATE: 59 BPM

## 2022-08-12 VITALS
HEIGHT: 70 IN | RESPIRATION RATE: 18 BRPM | OXYGEN SATURATION: 98 % | TEMPERATURE: 97 F | HEART RATE: 69 BPM | DIASTOLIC BLOOD PRESSURE: 84 MMHG | WEIGHT: 214.29 LBS | SYSTOLIC BLOOD PRESSURE: 133 MMHG

## 2022-08-12 DIAGNOSIS — Z96.651 PRESENCE OF RIGHT ARTIFICIAL KNEE JOINT: Chronic | ICD-10-CM

## 2022-08-12 DIAGNOSIS — I25.119 ATHEROSCLEROTIC HEART DISEASE OF NATIVE CORONARY ARTERY WITH UNSPECIFIED ANGINA PECTORIS: ICD-10-CM

## 2022-08-12 DIAGNOSIS — Z98.890 OTHER SPECIFIED POSTPROCEDURAL STATES: Chronic | ICD-10-CM

## 2022-08-12 LAB
ALBUMIN SERPL ELPH-MCNC: 4.3 G/DL — SIGNIFICANT CHANGE UP (ref 3.3–5.2)
ALP SERPL-CCNC: 88 U/L — SIGNIFICANT CHANGE UP (ref 40–120)
ALT FLD-CCNC: 22 U/L — SIGNIFICANT CHANGE UP
ANION GAP SERPL CALC-SCNC: 12 MMOL/L — SIGNIFICANT CHANGE UP (ref 5–17)
APTT BLD: 25.6 SEC — LOW (ref 27.5–35.5)
AST SERPL-CCNC: 23 U/L — SIGNIFICANT CHANGE UP
BASOPHILS # BLD AUTO: 0.05 K/UL — SIGNIFICANT CHANGE UP (ref 0–0.2)
BASOPHILS NFR BLD AUTO: 1 % — SIGNIFICANT CHANGE UP (ref 0–2)
BILIRUB SERPL-MCNC: 0.3 MG/DL — LOW (ref 0.4–2)
BUN SERPL-MCNC: 17.8 MG/DL — SIGNIFICANT CHANGE UP (ref 8–20)
CALCIUM SERPL-MCNC: 8.8 MG/DL — SIGNIFICANT CHANGE UP (ref 8.4–10.5)
CHLORIDE SERPL-SCNC: 101 MMOL/L — SIGNIFICANT CHANGE UP (ref 98–107)
CO2 SERPL-SCNC: 24 MMOL/L — SIGNIFICANT CHANGE UP (ref 22–29)
CREAT SERPL-MCNC: 0.89 MG/DL — SIGNIFICANT CHANGE UP (ref 0.5–1.3)
EGFR: 97 ML/MIN/1.73M2 — SIGNIFICANT CHANGE UP
EOSINOPHIL # BLD AUTO: 0.2 K/UL — SIGNIFICANT CHANGE UP (ref 0–0.5)
EOSINOPHIL NFR BLD AUTO: 3.8 % — SIGNIFICANT CHANGE UP (ref 0–6)
GLUCOSE SERPL-MCNC: 108 MG/DL — HIGH (ref 70–99)
HCT VFR BLD CALC: 34.7 % — LOW (ref 39–50)
HGB BLD-MCNC: 11.9 G/DL — LOW (ref 13–17)
IMM GRANULOCYTES NFR BLD AUTO: 0.4 % — SIGNIFICANT CHANGE UP (ref 0–1.5)
INR BLD: 1.04 RATIO — SIGNIFICANT CHANGE UP (ref 0.88–1.16)
LYMPHOCYTES # BLD AUTO: 1.09 K/UL — SIGNIFICANT CHANGE UP (ref 1–3.3)
LYMPHOCYTES # BLD AUTO: 20.9 % — SIGNIFICANT CHANGE UP (ref 13–44)
MCHC RBC-ENTMCNC: 32.3 PG — SIGNIFICANT CHANGE UP (ref 27–34)
MCHC RBC-ENTMCNC: 34.3 GM/DL — SIGNIFICANT CHANGE UP (ref 32–36)
MCV RBC AUTO: 94.3 FL — SIGNIFICANT CHANGE UP (ref 80–100)
MONOCYTES # BLD AUTO: 0.5 K/UL — SIGNIFICANT CHANGE UP (ref 0–0.9)
MONOCYTES NFR BLD AUTO: 9.6 % — SIGNIFICANT CHANGE UP (ref 2–14)
NEUTROPHILS # BLD AUTO: 3.35 K/UL — SIGNIFICANT CHANGE UP (ref 1.8–7.4)
NEUTROPHILS NFR BLD AUTO: 64.3 % — SIGNIFICANT CHANGE UP (ref 43–77)
NT-PROBNP SERPL-SCNC: 58 PG/ML — SIGNIFICANT CHANGE UP (ref 0–300)
PCP SPEC-MCNC: SIGNIFICANT CHANGE UP
PLATELET # BLD AUTO: 228 K/UL — SIGNIFICANT CHANGE UP (ref 150–400)
POTASSIUM SERPL-MCNC: 3.9 MMOL/L — SIGNIFICANT CHANGE UP (ref 3.5–5.3)
POTASSIUM SERPL-SCNC: 3.9 MMOL/L — SIGNIFICANT CHANGE UP (ref 3.5–5.3)
PROT SERPL-MCNC: 6.3 G/DL — LOW (ref 6.6–8.7)
PROTHROM AB SERPL-ACNC: 12.1 SEC — SIGNIFICANT CHANGE UP (ref 10.5–13.4)
RBC # BLD: 3.68 M/UL — LOW (ref 4.2–5.8)
RBC # FLD: 12.3 % — SIGNIFICANT CHANGE UP (ref 10.3–14.5)
SARS-COV-2 RNA SPEC QL NAA+PROBE: SIGNIFICANT CHANGE UP
SODIUM SERPL-SCNC: 137 MMOL/L — SIGNIFICANT CHANGE UP (ref 135–145)
TROPONIN T SERPL-MCNC: <0.01 NG/ML — SIGNIFICANT CHANGE UP (ref 0–0.06)
WBC # BLD: 5.21 K/UL — SIGNIFICANT CHANGE UP (ref 3.8–10.5)
WBC # FLD AUTO: 5.21 K/UL — SIGNIFICANT CHANGE UP (ref 3.8–10.5)

## 2022-08-12 PROCEDURE — 99214 OFFICE O/P EST MOD 30 MIN: CPT | Mod: 25

## 2022-08-12 PROCEDURE — 99233 SBSQ HOSP IP/OBS HIGH 50: CPT

## 2022-08-12 PROCEDURE — 93010 ELECTROCARDIOGRAM REPORT: CPT | Mod: 76

## 2022-08-12 PROCEDURE — 92928 PRQ TCAT PLMT NTRAC ST 1 LES: CPT | Mod: RC

## 2022-08-12 PROCEDURE — 99152 MOD SED SAME PHYS/QHP 5/>YRS: CPT

## 2022-08-12 PROCEDURE — 99285 EMERGENCY DEPT VISIT HI MDM: CPT

## 2022-08-12 PROCEDURE — 71045 X-RAY EXAM CHEST 1 VIEW: CPT | Mod: 26

## 2022-08-12 PROCEDURE — 99223 1ST HOSP IP/OBS HIGH 75: CPT

## 2022-08-12 PROCEDURE — 93454 CORONARY ARTERY ANGIO S&I: CPT | Mod: 26,59

## 2022-08-12 PROCEDURE — 93000 ELECTROCARDIOGRAM COMPLETE: CPT

## 2022-08-12 RX ORDER — METOPROLOL SUCCINATE 25 MG/1
25 TABLET, EXTENDED RELEASE ORAL DAILY
Qty: 90 | Refills: 1 | Status: DISCONTINUED | COMMUNITY
Start: 2020-08-07 | End: 2022-08-12

## 2022-08-12 RX ORDER — ASPIRIN/CALCIUM CARB/MAGNESIUM 324 MG
81 TABLET ORAL DAILY
Refills: 0 | Status: DISCONTINUED | OUTPATIENT
Start: 2022-08-12 | End: 2022-08-16

## 2022-08-12 RX ORDER — SODIUM FLUORIDE 1.1 G/100G
0 GEL ORAL
Qty: 0 | Refills: 0 | DISCHARGE

## 2022-08-12 RX ORDER — ATORVASTATIN CALCIUM 80 MG/1
20 TABLET, FILM COATED ORAL AT BEDTIME
Refills: 0 | Status: DISCONTINUED | OUTPATIENT
Start: 2022-08-12 | End: 2022-08-13

## 2022-08-12 RX ORDER — LOSARTAN POTASSIUM 100 MG/1
25 TABLET, FILM COATED ORAL DAILY
Refills: 0 | Status: DISCONTINUED | OUTPATIENT
Start: 2022-08-12 | End: 2022-08-16

## 2022-08-12 RX ORDER — OXYCODONE AND ACETAMINOPHEN 10; 325 MG/1; MG/1
10-325 TABLET ORAL
Qty: 10 | Refills: 0 | Status: DISCONTINUED | COMMUNITY
Start: 2021-01-29 | End: 2022-08-12

## 2022-08-12 RX ORDER — CEPHALEXIN 500 MG/1
500 CAPSULE ORAL 4 TIMES DAILY
Qty: 40 | Refills: 0 | Status: DISCONTINUED | COMMUNITY
Start: 2020-10-26 | End: 2022-08-12

## 2022-08-12 RX ORDER — SODIUM CHLORIDE 9 MG/ML
250 INJECTION INTRAMUSCULAR; INTRAVENOUS; SUBCUTANEOUS ONCE
Refills: 0 | Status: COMPLETED | OUTPATIENT
Start: 2022-08-12 | End: 2022-08-12

## 2022-08-12 RX ORDER — CLOPIDOGREL BISULFATE 75 MG/1
75 TABLET, FILM COATED ORAL DAILY
Refills: 0 | Status: DISCONTINUED | OUTPATIENT
Start: 2022-08-12 | End: 2022-08-16

## 2022-08-12 RX ADMIN — LOSARTAN POTASSIUM 25 MILLIGRAM(S): 100 TABLET, FILM COATED ORAL at 20:21

## 2022-08-12 RX ADMIN — ATORVASTATIN CALCIUM 20 MILLIGRAM(S): 80 TABLET, FILM COATED ORAL at 21:32

## 2022-08-12 RX ADMIN — SODIUM CHLORIDE 250 MILLILITER(S): 9 INJECTION INTRAMUSCULAR; INTRAVENOUS; SUBCUTANEOUS at 19:55

## 2022-08-12 NOTE — CARDIOLOGY SUMMARY
[de-identified] : Sinus bradycardia at 59 bpm.  Qs in II, III, and aVF consistent with prior IWMI consistent with  of mid RCA

## 2022-08-12 NOTE — H&P ADULT - NSHPLABSRESULTS_GEN_ALL_CORE
7/2020 NST   Small to moderate sized mid-basal inferolateral and basal inferior wall infarct with mild apoorva-infarct ischemia, LVEF 64%    2/2020 TTE   LVEF 55-60%, hypokinesis of the basal and mid inferolateral wall  Normal RV size and systolic function  Mild MR  No pericardial effusion    [address] [facility]   LM: 20 % stenosis  Mid LAD: 0% at site of prior stent  Proximal LCx: 0% at site of prior stent  Distal LCx: 100% stenosis  Mid RCA: 0% at site of prior stent    2013 [facility]   OJ x 3 prox RCA with laser/rotational atherectomy

## 2022-08-12 NOTE — ED PROVIDER NOTE - OBJECTIVE STATEMENT
61 y/o male w/PMHx of MI, 9 stents placed, on Plavix and Aspirin c/o sob and CP. Pt is typically sob, but worsened this morning along with new onset CP radiating to his left shoulder. Currently not having CP but remains sob at rest. Pt had a scheduled appt with his cardiologist this morning, but was told to come here for a cath study. Otherwise denies fever/chills, cough, abd pain, leg pain/swelling.

## 2022-08-12 NOTE — ED CLERICAL - DIVISION
[facility] Spine appears normal, range of motion is not limited, no muscle or joint tenderness

## 2022-08-12 NOTE — H&P ADULT - ASSESSMENT
62 M  with CAD s/p PCI, active cocaine use presents from cardiologists office for SOB and decreasing exercise tolerance with intermittent CP on exertion     Admit to Medicine to Telemetry     Angina   CE neg,   EKG with some T wave changes  Continue ASA/Plavix   Send Llipid and A1C   In cath lab, going in for LHC   Will get TTE   Start Atorvastatin at lower dose (20 mg and up titrate as able     Cocaine use  Recent cocaine use, last used 3 days ago  Avoid B Blockers for now  Counseling done,      DVT ppx : s/c Heparin when cleared by Cardiology     HCP : Brother (Khanh)      62 M  with CAD s/p PCI, active cocaine use presents from cardiologists office for SOB and decreasing exercise tolerance with intermittent CP on exertion     Admit to Medicine to Telemetry     Angina   CE neg,   EKG with some T wave changes  Continue ASA/Plavix   Send Llipid and A1C   In cath lab, going in for [facility]   Will get TTE   Start Atorvastatin at lower dose (20 mg and up titrate as able)    Cocaine use  Recent cocaine use, last used 3 days ago  Avoid B Blockers for now  Send U tox with PCP   Counseling done,      DVT ppx : s/c Heparin when cleared by Cardiology     HCP : Brother (Khanh)

## 2022-08-12 NOTE — H&P ADULT - HISTORY OF PRESENT ILLNESS
Briefly pt is a 62 year old male with CAD s/p multiple PCIs (last 2017), HLD who presentsy Male with CAD s/p multiple PCI (last in 2017 per notes), HLD, knee replacement here with CP/SOB. Not seen in office in a couple years. Past few months noting increased dyspnea on exertion. Today developed CP, left side of chest. Modeate intensity, pressure like. None now. No associated nausea/vomiting/diaphoresis. Has noted some on/off chest pain past few months as well but not as bad as SOB. ? exertional. No PND/orthopnea/palps/syncope/edema. No fevers/chills/sweats/cough.       Briefly pt is a 62 year old male with CAD s/p multiple PCIs (last 2017), HLD who presents Male with CAD s/p multiple PCI (last in 2017 per notes), who presents form home with SOB x 2 months. Reports that he has been experiencing decreased exercise tolerance with inability to climb 2 flights of stairs without becoming dyspneic Denies palpitations, light handedness abd pain changes in GI// habits. Went to his cardiologists office for a wellness visit after 3 years and was sent for cardiac eval including a LHC.    Reports that he does not take his Statin drugs because they make his legs cramp     Of note pt does active use cocaine (last use 3 days ago)

## 2022-08-12 NOTE — DISCUSSION/SUMMARY
[FreeTextEntry1] : Case and plan discussed with Dr. Nuñez. \par \par 1 - Hypertension:  repeat pressure 120/84.  Advised to follow low sodium diet..\par \par 2 - Coronary artery disease with multistenting:   He has been experiencing shortness of breath with minimal exertion and fatigue "for quite some time."  Has not been experiencing exertional chest pain, palpitations, lightheadedness or syncope.  Admits that he has only been taking low dose aspirin and plavix and has not been following a healthy diet.  Will arrange for him to undergo left heart cardiac catheterization at [facility].  Start isosorbide mononitrate 30mg daily.\par \par 3 - Hyperlipidemia:  no recent blood work and has not been taking his statin.  Fasting blood work prior to follow up visit.\par \par 4 - Follow up after cardiac catheterization is completed.

## 2022-08-12 NOTE — CONSULT NOTE ADULT - SUBJECTIVE AND OBJECTIVE BOX
CHIEF COMPLAINT: chest pain and SOB     HPI: Patient is a  62y Male with CAD s/p multiple PCI (last in 2017 per notes), HLD, knee replacement here with CP/SOB.     PAST MEDICAL & SURGICAL HISTORY:  CAD (Coronary Artery Disease)      Hyperlipidemia      H/O heart artery stent      Osteoarthritis of right knee      s/p Angioplasty with Stent      History of Back Surgery  fusion      Fracture, Femur  right      H/O hernia repair      H/O total knee replacement, right          MEDICATIONS:  MEDICATIONS  (STANDING):    MEDICATIONS  (PRN):        FAMILY HISTORY:  FAMILY HISTORY:  Family history of heart attack (Father)  father, dies at 61 yo    FH: CABG (coronary artery bypass surgery) (Father)  father    FHx: stroke (Mother)  mother        SOCIAL HISTORY: no EtOH, drugs or tobacco    ROS: All others negative    PHYSCIAL EXAM:  Vital Signs Last 24 Hrs  T(C): 36.1 (12 Aug 2022 11:38), Max: 36.1 (12 Aug 2022 11:38)  T(F): 97 (12 Aug 2022 11:38), Max: 97 (12 Aug 2022 11:38)  HR: 69 (12 Aug 2022 11:38) (69 - 69)  BP: 133/84 (12 Aug 2022 11:38) (133/84 - 133/84)  BP(mean): --  RR: 18 (12 Aug 2022 11:38) (18 - 18)  SpO2: 98% (12 Aug 2022 11:38) (98% - 98%)    Parameters below as of 12 Aug 2022 11:38  Patient On (Oxygen Delivery Method): room air      I&O's Summary    GEN: NAD  HEENT: MMM, sclera anicteric  RESP: CTA bilaterally  CVS: S1S2, RRR, no JVD, no M/R/G  GI: Soft, NT, ND, BS+  EXT: no C/C/E  NEURO: AAOX3  PSYCH: Normal affect    LABS:                        11.9   5.21  )-----------( 228      ( 12 Aug 2022 12:29 )             34.7               PT/INR - ( 12 Aug 2022 12:29 )   PT: 12.1 sec;   INR: 1.04 ratio         PTT - ( 12 Aug 2022 12:29 )  PTT:25.6 sec      Outpatient nuclear stress test (7/2020):  Small to moderate sized mid-basal inferolateral and basal inferior wall infarct with mild apoorva-infarct ischemia, LVEF 64%    Outpatient TTE (2/2020):  LVEF 55-60%, hypokinesis of the basal and mid inferolateral wall  Normal RV size and systolic function  Mild MR  No pericardial effusion    Cardiac Cath (2011):  LM: 20 % stenosis  Mid LAD: 0% at site of prior stent  Proximal LCx: 0% at site of prior stent  Distal LCx: 100% stenosis  Mid RCA: 0% at site of prior stent        Assessment:    Patient is a  62y Male with CAD s/p multiple PCI (last in 2017 per notes), HLD, knee replacement here with CP/SOB.   -BW unremarkable, first set trops negative    Plan: (TO BE SEEN, INCOMPLETE)  1.       Milind Alcantara MD CHIEF COMPLAINT: chest pain and SOB     HPI: Patient is a  62y Male with CAD s/p multiple PCI (last in 2017 per notes), HLD, knee replacement here with CP/SOB. Not seen in office in a couple years. Past few months noting increased dyspnea on exertion. Today developed CP, left side of chest. Modeate intensity, pressure like. None now. No associated nausea/vomiting/diaphoresis. Has noted some on/off chest pain past few months as well but not as bad as SOB. ? exertional. No PND/orthopnea/palps/syncope/edema. No fevers/chills/sweats/cough.     PAST MEDICAL & SURGICAL HISTORY:  CAD (Coronary Artery Disease)      Hyperlipidemia      H/O heart artery stent      Osteoarthritis of right knee      s/p Angioplasty with Stent      History of Back Surgery  fusion      Fracture, Femur  right      H/O hernia repair      H/O total knee replacement, right            FAMILY HISTORY:  FAMILY HISTORY:  Family history of heart attack (Father)  father, dies at 59 yo    FH: CABG (coronary artery bypass surgery) (Father)  father    FHx: stroke (Mother)  mother        SOCIAL HISTORY: no EtOH, drugs or tobacco    ROS: GI negative, All others negative    PHYSCIAL EXAM:  Vital Signs Last 24 Hrs  T(C): 36.1 (12 Aug 2022 11:38), Max: 36.1 (12 Aug 2022 11:38)  T(F): 97 (12 Aug 2022 11:38), Max: 97 (12 Aug 2022 11:38)  HR: 69 (12 Aug 2022 11:38) (69 - 69)  BP: 133/84 (12 Aug 2022 11:38) (133/84 - 133/84)  BP(mean): --  RR: 18 (12 Aug 2022 11:38) (18 - 18)  SpO2: 98% (12 Aug 2022 11:38) (98% - 98%)    Parameters below as of 12 Aug 2022 11:38  Patient On (Oxygen Delivery Method): room air      I&O's Summary    GEN: NAD  HEENT: MMM, sclera anicteric  RESP: CTA bilaterally  CVS: S1S2, RRR, no JVD, no M/R/G  GI: Soft, NT, ND, BS+  EXT: no C/C/E  NEURO: AAOX3  PSYCH: Normal affect    LABS:                        11.9   5.21  )-----------( 228      ( 12 Aug 2022 12:29 )             34.7               PT/INR - ( 12 Aug 2022 12:29 )   PT: 12.1 sec;   INR: 1.04 ratio         PTT - ( 12 Aug 2022 12:29 )  PTT:25.6 sec      Outpatient nuclear stress test (7/2020):  Small to moderate sized mid-basal inferolateral and basal inferior wall infarct with mild apoorva-infarct ischemia, LVEF 64%    Outpatient TTE (2/2020):  LVEF 55-60%, hypokinesis of the basal and mid inferolateral wall  Normal RV size and systolic function  Mild MR  No pericardial effusion    Cardiac Cath (2011):  LM: 20 % stenosis  Mid LAD: 0% at site of prior stent  Proximal LCx: 0% at site of prior stent  Distal LCx: 100% stenosis  Mid RCA: 0% at site of prior stent        Assessment:    Patient is a  62y Male with CAD s/p multiple PCI (last in 2017 per notes), HLD, knee replacement here with CP/SOB.   -BW unremarkable, first set trops negative  -Has been taking his ASA/plavix, but not on statin. Was getting cramping and hand pains on rosuvastatin, recommend trial of atorvastatin.   -Symptoms concerning for angina, similar to prior dyspnea that was relieved with revascularization.     Plan:   1. Admit for cath  2. Continue ASA/plavix, restart trial of statin  3. Further management pending cath  4. Echo  5. Thanks!      Milind Alcantara MD

## 2022-08-12 NOTE — ED PROVIDER NOTE - NS ED ROS FT
Constitutional: no fever, no chills  Head: NC, AT   Eyes: no redness   ENMT: no nasal congestion/drainage, no sore throat   CV: chest pain radiating to left shoulder, no edema  Resp: no cough, dyspnea at rest and on exertion  GI: no abdominal pain, no nausea, no vomiting, no diarrhea  : no dysuria, no hematuria   Skin: no lesions, no rashes   Neuro: no LOC, no headache, no sensory deficits, no weakness

## 2022-08-12 NOTE — PATIENT PROFILE ADULT - NSPROGENSOURCEINFO_GEN_A_NUR
H&P Update:  Miriam Sierra was seen and examined. History and physical has been reviewed. Significant clinical changes have occurred as noted: Will place IVC filter today.     Signed By: Ajay Beltran MD     June 28, 2018 8:20 AM
patient

## 2022-08-12 NOTE — HISTORY OF PRESENT ILLNESS
[FreeTextEntry1] : Mr. Charles presents today after being lost to follow up for almost 2 years.  He has been experiencing shortness of breath with minimal exertion and fatigue "for quite some time."  Has not been experiencing exertional chest pain, palpitations, lightheadedness or syncope.  Admits that he has only been taking low dose aspirin and plavix and has not been following a healthy diet.

## 2022-08-12 NOTE — ED ADULT NURSE NOTE - OBJECTIVE STATEMENT
Pt presents to ed with cp and sob since yesterday. Currently has no sob or cp. Pt admitted to cath lab. Pt in NAD at this time

## 2022-08-12 NOTE — REASON FOR VISIT
[FreeTextEntry1] : Mr. Charles is a pleasant 62-year-old white male with a past medical history significant for hyperlipidemia as well as coronary artery disease status-post multivessel PCI, who presents for follow up evaluation.  \par

## 2022-08-12 NOTE — ED PROVIDER NOTE - PHYSICAL EXAMINATION
General: well appearing, NAD  Head: NC, AT  EENT: EOMI, no scleral icterus  Cardiac: RRR, no apparent murmurs, trace edema  Respiratory: CTABL, no respiratory distress   Abdomen: soft, ND, NT, nonperitonitic  MSK/Vascular: full ROM, soft compartments, warm extremities  Neuro: AAOx3, sensation to light touch intact  Psych: calm, cooperative

## 2022-08-12 NOTE — H&P ADULT - NSHPPHYSICALEXAM_GEN_ALL_CORE
Gen : Non toxic appearing, comfortable, NAD  HEENT : NCAT, MMM, No cervical lymphadenopathy, no JVD  CVS : S1S2, regular rate and rhythm, no murmurs  Resp : CTA b/l, no rhonchi or wheezes appreciated   Abd : Soft, non distended, non tender, BS +ve   MSK : No joint swelling or tenderness, no digital clubbing, no distal cyanosis  Neuro : CN II-XII intact, no focal motor or sensory deficits   Psych : Pleasant, no anxiety, normal affect

## 2022-08-12 NOTE — ED ADULT TRIAGE NOTE - BMI (KG/M2)
Progress Note - Infectious Disease   Kaushik Rhodes 77 y o  male MRN: [id number]  Unit/Bed#: E5 -01 Encounter: [id number]      Impression/Plan:    1- SIRS, POA:  Fever, tachycardia noted on admission  No infectious focus identified based on review of system, physical exam, UA, blood culture, CT chest abdomen and pelvis  His COVID-19 PCR was negative  Workup done at [facility] reviewed, including negative blood culture from 05/24 and 05/25/2021, and a negative respiratory viral panel  Fever/chills/night sweats most likely secondary to lymphoma/B symptoms   All blood cultures have no growth  Patient had been on antibiotics initially, but now off them  Temperature pattern unchanged  Patient remains clinically and systemically well off antibiotics  -   continue to observe off antibiotics  - follow-up final result of blood culture sent 06/03/2021 which remains negative so far  - Heme-Onc follow-up     2- stage IVB Hodgkin lymphoma:  Last treated 08/2020 with nivolumab, then, from currently available records in Care everywhere, it appears that patient moved to [facility]\"" and pursued homeopathic treatment, but unfortunately now his last CT scan abdomen pelvis is suggestive of progressive disease, new sites of lymphomatous process, including liver, spleen, soft tissue in the paraspinal area  - as mentioned above, SIRS is most likely related to his widespread lymphoma and B symptoms   - patient needs close oncology follow-up     3-  small PE   Patient minimally symptomatic   -  anticoagulation per primary service     4  wheezing:  Patient is clinically improved  No wheezing noted today, but signs of intercostal retraction  CT without obstruction   - close monitoring of respiratory status  - consider pulmonary evaluation     5- protein calorie malnutrition:  Likely related to problem 2    - oncology evaluation to determine to reinitiate immunotherapy    - dietary follow-up  - palliative care follow-up     Discussed with patient  Okay for discharge from ID viewpoint  Patient needs close Heme-Onc follow-up after discharge in order to start treatment directed to the lymphoma  Antibiotics:  None      Subjective:  Despite recorded fever over past 24 hours, patient denies fever or chills  Denies pain  Denies shortness of breath or wheezing    Objective:  Vitals:  Temp:  [97 9 °F (36 6 °C)-101 3 °F (38 5 °C)] 98 3 °F (36 8 °C)  HR:  [] 112  Resp:  [16-20] 18  BP: ()/(54-71) 107/70  SpO2:  [94 %-99 %] 95 %  Temp (24hrs), Av °F (37 2 °C), Min:97 9 °F (36 6 °C), Max:101 3 °F (38 5 °C)  Current: Temperature: 98 3 °F (36 8 °C)    Physical Exam:   General Appearance:  Alert, interactive, no signs of acute distress, but appears chronically ill   Throat: Oropharynx moist without lesions  Lungs:   Clear to auscultation bilaterally; no wheezes, rhonchi or rales; respirations unlabored, but signs of intercostal retractions seen during exam     Heart:  RRR; no murmur, rub or gallop   Abdomen:   Soft, non-tender, non-distended, positive bowel sounds  Extremities: No clubbing, cyanosis or edema   Skin: No new rashes or lesions  No draining wounds noted         Labs, Imaging, & Other studies:   All pertinent labs and imaging studies were personally reviewed  Results from last 7 days   Lab Units 21  0353 21  1844 21  0536 21  0534   WBC Thousand/uL 5 55  --  6 14 5 22   HEMOGLOBIN g/dL 8 1* 8 3* 6 7* 7 8*   PLATELETS Thousands/uL 277  --  312 372     Results from last 7 days   Lab Units 21  0353 21  0537 21  0534 21  0501 21  0633 21  1527   SODIUM mmol/L 134* 131* 132* 135* 136 135*   POTASSIUM mmol/L 2 9* 3 5 3 2* 3 8 4 1 4 3   CHLORIDE mmol/L 93* 94* 92* 95* 97* 95*   CO2 mmol/L 34* 30 30 30 30 35*   BUN mg/dL 15 14 14 14 10 12   CREATININE mg/dL 0 50* 0 47* 0 56* 0 58* 0 54* 0 57*   EGFR ml/min/1 73sq m 113 116 108 106 109 107 CALCIUM mg/dL 8 1* 8 1* 8 6 8 7 8 5 8 5   AST U/L  --   --   --  23 32 40   ALT U/L  --   --   --  34 41 49   ALK PHOS U/L  --   --   --  151* 186* 206*     Results from last 7 days   Lab Units 06/02/21  1527   BLOOD CULTURE  No Growth After 4 Days  No Growth After 4 Days       Results from last 7 days   Lab Units 06/05/21  0534 06/04/21  0501 06/03/21  0633 06/02/21  1527   PROCALCITONIN ng/ml 25 75* 29 79* 8 38* 0 63* 30.7

## 2022-08-12 NOTE — PROGRESS NOTE ADULT - SUBJECTIVE AND OBJECTIVE BOX
Cardiac Cath NP Note           Patient is a 62y old  Male who presents with a chief complaint of progressive JOHNSON   Plan: [facility] today     HPI:  62y Male with CAD s/p multiple PCI (last in 2017 per notes), HLD, knee replacement here with CP/SOB. Has had progressive JOHNSON x 3 months, unable to walk flight of stairs without resting, symptoms improve with rest. Mild left wall chest pain today while working in yard prompting him to ED       PAST MEDICAL & SURGICAL HISTORY:  CAD (Coronary Artery Disease)  Hyperlipidemia  H/O heart artery stent  Osteoarthritis of right knee  s/p Angioplasty with Stent  History of Back Surgery  fusion    Fracture, Femur  right      H/O hernia repair      H/O total knee replacement, right          RADIOLOGY & ADDITIONAL STUDIES/DIAGNOSTIC TESTING:      Outpatient nuclear stress test (7/2020):  Small to moderate sized mid-basal inferolateral and basal inferior wall infarct with mild apoorva-infarct ischemia, LVEF 64%    Outpatient TTE (2/2020):  LVEF 55-60%, hypokinesis of the basal and mid inferolateral wall  Normal RV size and systolic function  Mild MR  No pericardial effusion    Cardiac Cath (2011):  LM: 20 % stenosis  Mid LAD: 0% at site of prior stent  Proximal LCx: 0% at site of prior stent  Distal LCx: 100% stenosis  Mid RCA: 0% at site of prior stent    Cardiac Cath 2013   OJ x 3 prox RCA with laser/rotational atherectomy    Allergies    No Known Allergies    Intolerances        MEDICATIONS  (STANDING):    MEDICATIONS  (PRN):      FAMILY HISTORY:  Family history of heart attack (Father)  father, dies at 61 yo    FH: CABG (coronary artery bypass surgery) (Father)  father    FHx: stroke (Mother)  mother        SOCIAL HISTORY:    CIGARETTES: denies     ALCOHOL: socially, no recreational drug use     REVIEW OF SYSTEMS:  General: No fevers/chills, or fatigue  HEENT: No visual disturbances, no hearing loss, no headaches, no epistaxis.  CV: ,no palpitations, no edema, no orthopnea, no PND, or JOHNSON  Respiratory: No dyspnea, no wheeze, no cough.  GI: no nausea/vomiting, no black/bloody stools.  : No hematuria  Musculoskeletal: No myalgias, no arthralgias, no back pain.    Vital Signs Last 24 Hrs  T(C): 36.7 (12 Aug 2022 15:58), Max: 36.8 (12 Aug 2022 15:30)  T(F): 98.1 (12 Aug 2022 15:58), Max: 98.2 (12 Aug 2022 15:30)  HR: 56 (12 Aug 2022 15:58) (56 - 78)  BP: 137/91 (12 Aug 2022 15:58) (132/65 - 137/91)  RR: 16 (12 Aug 2022 15:58) (16 - 18)  SpO2: 99% (12 Aug 2022 15:58) (98% - 99%)    Parameters below as of 12 Aug 2022 15:58  Patient On (Oxygen Delivery Method): room air        Daily Height in cm: 177.8 (12 Aug 2022 11:38)    Daily     I&O's Detail      PHYSICAL EXAM:   GENERAL: Pt lying comfortably, NAD.  ENMT: PERRL, +EOMI.  NECK: soft, Supple, No JVD,   CHEST/LUNG: Clear to auscultatation bilaterally; No wheezing.  HEART: S1S2+, Regular rate and rhythm; No murmurs.  ABDOMEN: Soft, Nontender, Nondistended; Bowel sounds present.  MUSCULOSKELETAL: Normal range of motion.  SKIN: No rashes or lesions.  NEURO: AAOX3, no focal deficits, no motor r sensory loss.  PSYCH: normal mood.  VASCULAR:   Radial +2 R/+2 L  Femoral +2 R/+2 L  PT +2 R/+2 L  DP +2 R/+2 L      INTERPRETATION OF TELEMETRY:    ECG:    LABS:                        11.9   5.21  )-----------( 228      ( 12 Aug 2022 12:29 )             34.7     08-12    137  |  101  |  17.8  ----------------------------<  108<H>  3.9   |  24.0  |  0.89    Ca    8.8      12 Aug 2022 12:29    TPro  6.3<L>  /  Alb  4.3  /  TBili  0.3<L>  /  DBili  x   /  AST  23  /  ALT  22  /  AlkPhos  88  08-12    CARDIAC MARKERS ( 12 Aug 2022 12:29 )  x     / <0.01 ng/mL / x     / x     / x          PT/INR - ( 12 Aug 2022 12:29 )   PT: 12.1 sec;   INR: 1.04 ratio         PTT - ( 12 Aug 2022 12:29 )  PTT:25.6 sec    I&O's Summary    BNPSerum Pro-Brain Natriuretic Peptide: 58 pg/mL (08-12 @ 12:29)    Serum Pro-Brain Natriuretic Peptide: 58 pg/mL (08-12-22 @ 12:29)

## 2022-08-12 NOTE — ED PROVIDER NOTE - ATTENDING CONTRIBUTION TO CARE
5
61 y/o male w/PMHx of MI, 9 stents placed, on Plavix and Aspirin c/o sob and CP. Pt is typically sob, but worsened this morning along with new onset CP radiating to his left shoulder. C  pe awake alert in nad heent ncat neck supple cor s1 s2 lung clear abd soft nontender neuro nonfocal ; dx chest pain

## 2022-08-12 NOTE — PHYSICAL EXAM
[Well Developed] : well developed [Well Nourished] : well nourished [No Acute Distress] : no acute distress [Normal Conjunctiva] : normal conjunctiva [Normal Venous Pressure] : normal venous pressure [No Carotid Bruit] : no carotid bruit [Normal S1, S2] : normal S1, S2 [No Murmur] : no murmur [No Rub] : no rub [No Gallop] : no gallop [Clear Lung Fields] : clear lung fields [No Respiratory Distress] : no respiratory distress  [Soft] : abdomen soft [Normal Bowel Sounds] : normal bowel sounds [Normal Gait] : normal gait [No Edema] : no edema [No Rash] : no rash [No Skin Lesions] : no skin lesions [Moves all extremities] : moves all extremities [No Focal Deficits] : no focal deficits [Normal Speech] : normal speech [Alert and Oriented] : alert and oriented [Normal memory] : normal memory [de-identified] : Muscular/stocky

## 2022-08-12 NOTE — PROGRESS NOTE ADULT - ASSESSMENT
Risk Assessments:  ASA: 3  Mallampati: 2  GFR: 97  Cr: 0.89  BRA: 0.8%      Impression: Patient is a  62y Male with CAD s/p multiple PCI (last in 2017 per notes), HLD, knee replacement here with CP/SOB.   -BW unremarkable, first set trops negative    Plan:    -plan for C with SS   -patient seen and examined  -confirmed appropriate NPO duration  -ECG and Labs reviewed  -Aspirin 81mg and Plavix 75mg  po pre-cath (at home)  -NS 0.9% 250ml/hr x 1 bolus; pre procedure BONY ppx   -procedure discussed with patient; risks and benefits explained, questions answered  -consent obtained by attending IC Risk Assessments:  ASA: 3  Mallampati: 2  GFR: 97  Cr: 0.89  BRA: 0.8%      Impression: Patient is a  62y Male with CAD s/p multiple PCI (last in 2017 per notes), HLD, knee replacement here with CP/SOB.   -BW unremarkable, first set trops negative    Plan:    -plan for [facility] with SS   -patient seen and examined  -confirmed appropriate NPO duration  -ECG and Labs reviewed  -Aspirin 81mg and Plavix 75mg  po pre-cath (at home)  -NS 0.9% 250ml/hr x 1 bolus; pre procedure BONY ppx   -procedure discussed with patient; risks and benefits explained, questions answered  -consent obtained by attending IC    POST CATH ADDENDUM  Now s/p LHC via RRA with Dr. King, tolerated procedure well. Pt arrived to recovery in NAD and HDS,  access site stable, no bleed/hematoma, distal pulse +, neurovascular intact   Intraprocedurally: Pt rec'd IV sedation, heparin 11,000 units, Plavix 300mg po load, and omnipaque 100ml   Findings: ISR prox RCA treated with OJ x 1, mLCx ISR 80% and dLCx 80%, LAD mild nonobstructive  Post Cath EKG: SB 55bpm, no acute changes     Plan:  -Formal cath report pending  -optimize over weekend, NPO after MN on sunday for C with staged PCI to circumflex artery   -Post procedure management/monitoring per protocol  -Access site precautions  -Radial compression band removal at 2100  -Bedrest until band removed and hemostasis achieved   -Labs and EKG in am  -NS 0.9% 250ml/hr x 1 bolus: post procedure BONY ppx   -Repeat ECG if any clinical indication or change on tele  -Continue current medical therapy  -Dual anti platelet therapy with aspirin/plavix  -start losartan 25mg daily   -lipitor 20mg daily   -no BB in setting of SB and h/o heart block   -Educated regarding strict adherence with DAPT   -Educated regarding post procedure management and care  -Discussed the importance of RF modification  -Cardiac rehab info provided/referral and communication to cardiac rehab completed    Discussed with Dr. King, Dr. Alcantara, and patient

## 2022-08-13 LAB
A1C WITH ESTIMATED AVERAGE GLUCOSE RESULT: 5.4 % — SIGNIFICANT CHANGE UP (ref 4–5.6)
ALBUMIN SERPL ELPH-MCNC: 4.2 G/DL — SIGNIFICANT CHANGE UP (ref 3.3–5.2)
ALP SERPL-CCNC: 81 U/L — SIGNIFICANT CHANGE UP (ref 40–120)
ALT FLD-CCNC: 20 U/L — SIGNIFICANT CHANGE UP
AMPHET UR-MCNC: NEGATIVE — SIGNIFICANT CHANGE UP
ANION GAP SERPL CALC-SCNC: 11 MMOL/L — SIGNIFICANT CHANGE UP (ref 5–17)
ANION GAP SERPL CALC-SCNC: 12 MMOL/L — SIGNIFICANT CHANGE UP (ref 5–17)
AST SERPL-CCNC: 18 U/L — SIGNIFICANT CHANGE UP
BARBITURATES UR SCN-MCNC: NEGATIVE — SIGNIFICANT CHANGE UP
BENZODIAZ UR-MCNC: POSITIVE
BILIRUB SERPL-MCNC: 0.4 MG/DL — SIGNIFICANT CHANGE UP (ref 0.4–2)
BUN SERPL-MCNC: 13.5 MG/DL — SIGNIFICANT CHANGE UP (ref 8–20)
BUN SERPL-MCNC: 14.5 MG/DL — SIGNIFICANT CHANGE UP (ref 8–20)
CALCIUM SERPL-MCNC: 8.6 MG/DL — SIGNIFICANT CHANGE UP (ref 8.4–10.5)
CALCIUM SERPL-MCNC: 8.7 MG/DL — SIGNIFICANT CHANGE UP (ref 8.4–10.5)
CHLORIDE SERPL-SCNC: 105 MMOL/L — SIGNIFICANT CHANGE UP (ref 98–107)
CHLORIDE SERPL-SCNC: 106 MMOL/L — SIGNIFICANT CHANGE UP (ref 98–107)
CHOLEST SERPL-MCNC: 332 MG/DL — HIGH
CO2 SERPL-SCNC: 25 MMOL/L — SIGNIFICANT CHANGE UP (ref 22–29)
CO2 SERPL-SCNC: 25 MMOL/L — SIGNIFICANT CHANGE UP (ref 22–29)
COCAINE METAB.OTHER UR-MCNC: POSITIVE
CREAT SERPL-MCNC: 0.81 MG/DL — SIGNIFICANT CHANGE UP (ref 0.5–1.3)
CREAT SERPL-MCNC: 0.82 MG/DL — SIGNIFICANT CHANGE UP (ref 0.5–1.3)
EGFR: 100 ML/MIN/1.73M2 — SIGNIFICANT CHANGE UP
EGFR: 99 ML/MIN/1.73M2 — SIGNIFICANT CHANGE UP
ESTIMATED AVERAGE GLUCOSE: 108 MG/DL — SIGNIFICANT CHANGE UP (ref 68–114)
GLUCOSE SERPL-MCNC: 104 MG/DL — HIGH (ref 70–99)
GLUCOSE SERPL-MCNC: 94 MG/DL — SIGNIFICANT CHANGE UP (ref 70–99)
HCT VFR BLD CALC: 33.3 % — LOW (ref 39–50)
HCT VFR BLD CALC: 34.3 % — LOW (ref 39–50)
HCV AB S/CO SERPL IA: 0.06 S/CO — SIGNIFICANT CHANGE UP (ref 0–0.99)
HCV AB SERPL-IMP: SIGNIFICANT CHANGE UP
HDLC SERPL-MCNC: 48 MG/DL — SIGNIFICANT CHANGE UP
HGB BLD-MCNC: 11.4 G/DL — LOW (ref 13–17)
HGB BLD-MCNC: 11.9 G/DL — LOW (ref 13–17)
LIPID PNL WITH DIRECT LDL SERPL: 267 MG/DL — HIGH
MAGNESIUM SERPL-MCNC: 1.9 MG/DL — SIGNIFICANT CHANGE UP (ref 1.8–2.6)
MAGNESIUM SERPL-MCNC: 2.2 MG/DL — SIGNIFICANT CHANGE UP (ref 1.8–2.6)
MCHC RBC-ENTMCNC: 32.4 PG — SIGNIFICANT CHANGE UP (ref 27–34)
MCHC RBC-ENTMCNC: 32.9 PG — SIGNIFICANT CHANGE UP (ref 27–34)
MCHC RBC-ENTMCNC: 34.2 GM/DL — SIGNIFICANT CHANGE UP (ref 32–36)
MCHC RBC-ENTMCNC: 34.7 GM/DL — SIGNIFICANT CHANGE UP (ref 32–36)
MCV RBC AUTO: 94.6 FL — SIGNIFICANT CHANGE UP (ref 80–100)
MCV RBC AUTO: 94.8 FL — SIGNIFICANT CHANGE UP (ref 80–100)
METHADONE UR-MCNC: NEGATIVE — SIGNIFICANT CHANGE UP
NON HDL CHOLESTEROL: 284 MG/DL — HIGH
OPIATES UR-MCNC: NEGATIVE — SIGNIFICANT CHANGE UP
PCP UR-MCNC: NEGATIVE — SIGNIFICANT CHANGE UP
PHOSPHATE SERPL-MCNC: 4.2 MG/DL — SIGNIFICANT CHANGE UP (ref 2.4–4.7)
PLATELET # BLD AUTO: 199 K/UL — SIGNIFICANT CHANGE UP (ref 150–400)
PLATELET # BLD AUTO: 207 K/UL — SIGNIFICANT CHANGE UP (ref 150–400)
POTASSIUM SERPL-MCNC: 4.2 MMOL/L — SIGNIFICANT CHANGE UP (ref 3.5–5.3)
POTASSIUM SERPL-MCNC: 4.2 MMOL/L — SIGNIFICANT CHANGE UP (ref 3.5–5.3)
POTASSIUM SERPL-SCNC: 4.2 MMOL/L — SIGNIFICANT CHANGE UP (ref 3.5–5.3)
POTASSIUM SERPL-SCNC: 4.2 MMOL/L — SIGNIFICANT CHANGE UP (ref 3.5–5.3)
PROT SERPL-MCNC: 6 G/DL — LOW (ref 6.6–8.7)
RBC # BLD: 3.52 M/UL — LOW (ref 4.2–5.8)
RBC # BLD: 3.62 M/UL — LOW (ref 4.2–5.8)
RBC # FLD: 12.2 % — SIGNIFICANT CHANGE UP (ref 10.3–14.5)
RBC # FLD: 12.3 % — SIGNIFICANT CHANGE UP (ref 10.3–14.5)
SODIUM SERPL-SCNC: 142 MMOL/L — SIGNIFICANT CHANGE UP (ref 135–145)
SODIUM SERPL-SCNC: 142 MMOL/L — SIGNIFICANT CHANGE UP (ref 135–145)
THC UR QL: NEGATIVE — SIGNIFICANT CHANGE UP
TRIGL SERPL-MCNC: 83 MG/DL — SIGNIFICANT CHANGE UP
WBC # BLD: 4.77 K/UL — SIGNIFICANT CHANGE UP (ref 3.8–10.5)
WBC # BLD: 5.05 K/UL — SIGNIFICANT CHANGE UP (ref 3.8–10.5)
WBC # FLD AUTO: 4.77 K/UL — SIGNIFICANT CHANGE UP (ref 3.8–10.5)
WBC # FLD AUTO: 5.05 K/UL — SIGNIFICANT CHANGE UP (ref 3.8–10.5)

## 2022-08-13 PROCEDURE — 93010 ELECTROCARDIOGRAM REPORT: CPT

## 2022-08-13 PROCEDURE — 99233 SBSQ HOSP IP/OBS HIGH 50: CPT

## 2022-08-13 RX ORDER — ATROPINE SULFATE 0.1 MG/ML
0.5 SYRINGE (ML) INJECTION ONCE
Refills: 0 | Status: DISCONTINUED | OUTPATIENT
Start: 2022-08-13 | End: 2022-08-16

## 2022-08-13 RX ORDER — ATORVASTATIN CALCIUM 80 MG/1
40 TABLET, FILM COATED ORAL AT BEDTIME
Refills: 0 | Status: DISCONTINUED | OUTPATIENT
Start: 2022-08-13 | End: 2022-08-16

## 2022-08-13 RX ORDER — MAGNESIUM SULFATE 500 MG/ML
1 VIAL (ML) INJECTION ONCE
Refills: 0 | Status: COMPLETED | OUTPATIENT
Start: 2022-08-13 | End: 2022-08-13

## 2022-08-13 RX ADMIN — Medication 81 MILLIGRAM(S): at 11:58

## 2022-08-13 RX ADMIN — LOSARTAN POTASSIUM 25 MILLIGRAM(S): 100 TABLET, FILM COATED ORAL at 06:27

## 2022-08-13 RX ADMIN — CLOPIDOGREL BISULFATE 75 MILLIGRAM(S): 75 TABLET, FILM COATED ORAL at 11:58

## 2022-08-13 RX ADMIN — Medication 100 GRAM(S): at 02:25

## 2022-08-13 RX ADMIN — ATORVASTATIN CALCIUM 40 MILLIGRAM(S): 80 TABLET, FILM COATED ORAL at 20:41

## 2022-08-13 NOTE — CHART NOTE - NSCHARTNOTEFT_GEN_A_CORE
Right radial access soft, nontender, no hematoma, no bleed, DP 2+  Radial care instructions provided, full management per Dr. Alcantara/HARRISON and Hospitalist.

## 2022-08-13 NOTE — PROGRESS NOTE ADULT - SUBJECTIVE AND OBJECTIVE BOX
SHA GUTIERREZ  [id number]      Chief Complaint:  CP/SOB/CAD    Interval History:  Patient without c/o. Denies CP, SOB, palps.    Tele:  SR with bradycardia and varying HB o/n with 4 second pause all while sleeping.      aspirin enteric coated 81 milliGRAM(s) Oral daily  atorvastatin 20 milliGRAM(s) Oral at bedtime  atropine Injectable 0.5 milliGRAM(s) IV Push once PRN  clopidogrel Tablet 75 milliGRAM(s) Oral daily  losartan 25 milliGRAM(s) Oral daily          Physical Exam:  T(C): 36.6 (08-13-22 @ 06:23), Max: 36.8 (08-12-22 @ 15:30)  HR: 61 (08-13-22 @ 06:23) (55 - 78)  BP: 142/97 (08-13-22 @ 06:23) (119/75 - 150/92)  RR: 17 (08-13-22 @ 06:23) (16 - 18)  SpO2: 99% (08-13-22 @ 06:23) (95% - 99%)  Wt(kg): --  General: Comfortable in NAD  Neck: No JVD  CVS: nl s1s2, no s3  Pulm: CTA b/l  Abd: soft, non-tender  Ext: No c/c/e  Neuro A&O x3  Psych: Normal affect      Labs:   13 Aug 2022 05:35    142    |  105    |  13.5   ----------------------------<  94     4.2     |  25.0   |  0.81     Ca    8.7        13 Aug 2022 05:35  Phos  4.2       13 Aug 2022 00:58  Mg     2.2       13 Aug 2022 05:35    TPro  6.0    /  Alb  4.2    /  TBili  0.4    /  DBili  x      /  AST  18     /  ALT  20     /  AlkPhos  81     13 Aug 2022 05:35                          11.9   5.05  )-----------( 207      ( 13 Aug 2022 05:35 )             34.3     PT/INR - ( 12 Aug 2022 12:29 )   PT: 12.1 sec;   INR: 1.04 ratio         PTT - ( 12 Aug 2022 12:29 )  PTT:25.6 sec  CARDIAC MARKERS ( 12 Aug 2022 12:29 )  x     / <0.01 ng/mL / x     / x     / x              Outpatient nuclear stress test (7/2020):  Small to moderate sized mid-basal inferolateral and basal inferior wall infarct with mild apoorva-infarct ischemia, LVEF 64%    Outpatient TTE (2/2020):  LVEF 55-60%, hypokinesis of the basal and mid inferolateral wall  Normal RV size and systolic function  Mild MR  No pericardial effusion    Cardiac Cath (2011):  LM: 20 % stenosis  Mid LAD: 0% at site of prior stent  Proximal LCx: 0% at site of prior stent  Distal LCx: 100% stenosis  Mid RCA: 0% at site of prior stent        Assessment:    Patient is a  62y Male with CAD s/p multiple PCI (last in 2017 per notes), HLD, knee replacement here with CP/SOB.   -BW unremarkable, first set trops negative  -Has been taking his ASA/plavix, but not on statin. Was getting cramping and hand pains on rosuvastatin, recommend trial of atorvastatin.   -Symptoms concerning for angina, similar to prior dyspnea that was relieved with revascularization.   -Now s/p cath with severe MVD.  S/p PCI of RCA with need for PCI of LCX.  Will plan Monday.  -Tele with carying HB and jose david o/n.  Patient with known untreated YELITZA.  Needs Rx of YELITZA.  No indication for PPM now.    Plan:   1. Plan for PCI LCX Monday.  2. Continue ASA/plavix, restart trial of statin  3. F/u echo.  4. Needs Rx for YELITZA.  F/u with pulm.  5. Avoid BB with cocaine use.  Enforce need to stop.    D/w Dr. Patricia.

## 2022-08-13 NOTE — CHART NOTE - NSCHARTNOTEFT_GEN_A_CORE
62 M with CAD s/p PCI (9 previous stents placed), active cocaine use admitted with angina. Patient went for [facility] 8/12, 1 stent placed in RCA  Called by RN, patient with 4 second pause on cardiac monitor while sleeping   Patient seen and evaluated at bedside, no complaints   Patient states this has happened in the past on previous admissions to the hospital  ROS: No chest pain, palpitations, SOB, light headedness, dizziness, headache    Vital Signs   T(F): 97.7  HR: 55   BP: 134/82  RR: 18   SpO2: 97%   Patient On (Oxygen Delivery Method): room air    GENERAL: NAD, lying comfortably  NERVOUS SYSTEM: Alert and awake, Good concentration  CHEST/LUNG: Clear to auscultation b/l; Unlabored respirations  HEART: S1S2+, Regular rate and rhythm  ABDOMEN: Soft, Nontender, Nondistended; BS+   EXTREMITIES:  2+ Peripheral Pulses, No LE edema, no tenderness to palpation of b/l LE  SKIN: Warm and dry    STAT EKG sinus jose david HR 55, no acute ST changes   STAT BMP, Mag, Phos, CBC ordered (trop deferred as patient is s/p cath)   Atropine ordered for bedside, pacer pads placed on patient  Tele/ ordered   Cardio called and made aware, NAKIA Andrade in agreement with current plan   RN to notify with any acute changes 62 M with CAD s/p PCI (9 previous stents placed), active cocaine use admitted with angina. Patient went for [facility] 8/12, 1 stent placed in RCA  Called by RN, patient with 4 second pause on cardiac monitor while sleeping   Patient seen and evaluated at bedside, no complaints   Patient states this has happened in the past on previous admissions to the hospital   Patient also states he has hx of sleep apnea   ROS: No chest pain, palpitations, SOB, light headedness, dizziness, headache    Vital Signs   T(F): 97.7  HR: 55   BP: 134/82  RR: 18   SpO2: 97%   Patient On (Oxygen Delivery Method): room air    GENERAL: NAD, lying comfortably  NERVOUS SYSTEM: Alert and awake, Good concentration  CHEST/LUNG: Clear to auscultation b/l; Unlabored respirations  HEART: S1S2+, Regular rate and rhythm  ABDOMEN: Soft, Nontender, Nondistended; BS+   EXTREMITIES:  2+ Peripheral Pulses, No LE edema, no tenderness to palpation of b/l LE  SKIN: Warm and dry    STAT EKG sinus jose david HR 55, no acute ST changes   STAT BMP, Mag, Phos, CBC ordered (trop deferred as patient is s/p cath)   Atropine ordered for bedside, pacer pads placed on patient   ordered   Cardio called and made aware, NAKIA Andrade in agreement with current plan   RN to notify with any acute changes 62 M with CAD s/p PCI (9 previous stents placed), active cocaine use admitted with angina. Patient went for [facility] 8/12, 1 stent placed in RCA  Called by RN, patient with 4 second pause on cardiac monitor while sleeping   Cardiac strip reviewed - p waves seen with dropped QRS for 4 secs   Episodes of dropped QRS which appears like Mobitz 2 type 2 heart block seen on cardiac monitor   Patient seen and evaluated at bedside, no complaints   Patient states he has had cardiac pauses in the past on previous admissions to the hospital   Patient also states he has hx of sleep apnea   ROS: No chest pain, palpitations, SOB, light headedness, dizziness, headache    Vital Signs   T(F): 97.7  HR: 55   BP: 134/82  RR: 18   SpO2: 97%   Patient On (Oxygen Delivery Method): room air    GENERAL: NAD, lying comfortably  NERVOUS SYSTEM: Alert and awake, Good concentration  CHEST/LUNG: Clear to auscultation b/l; Unlabored respirations  HEART: S1S2+, Regular rate and rhythm  ABDOMEN: Soft, Nontender, Nondistended; BS+   EXTREMITIES:  2+ Peripheral Pulses, No LE edema, no tenderness to palpation of b/l LE  SKIN: Warm and dry    STAT EKG sinus jose david HR 55, no acute ST changes, no dropped QRS. No evidence of heart block seen on EKG.   STAT BMP, Mag, Phos, CBC ordered (trop deferred as patient is s/p cath)   Atropine ordered for bedside, pacer pads placed on patient   ordered   Cardio called and made aware, Cardio NAKIA Andrade in agreement with current plan   Cardio to follow up with patient in the AM     Addendum 2:20   Mag 1.9 -> supplemented for goal >2  All other labs WNL   RN to notify with any acute changes

## 2022-08-13 NOTE — PROGRESS NOTE ADULT - ASSESSMENT
62 M  with CAD s/p PCI, active cocaine use admitted from cardiologists office for SOB and decreasing exercise tolerance with intermittent CP on exertion now s/p LHC with PCI RCA with overnight bradycardia pause     Angina with CAD   Continue Telemetry monitoring   S/p PCI RCA  Continue ASA/Plavix   TTE pending   ASA/Plavix and Statin (no BB due to active cocaine use)    HLD  Markedly elevated lipid panel  Tolerating Atorvastatin 20, increase to 40 mg QHS   Would recommend repeat in 3-6 months and if still > 50% of current Lipoprotein A , may benefit from Repatha / Praluent (PCSK9 inh)    Mobitz 1 with bradycardia and 4 sec pause   Appreciate Cardiology recs, likely driven by pts YELITZA   Non compliant with f/u and never started use  Start CPAP QHS (start at 10 and titrate as needed)  PPM not indicated at this time     Polysubstance use   Utox pos for BDZ/ Cocaine  Cessation counseling done,   Avoid B-Blockers for now           HCP : Brother (Khanh)

## 2022-08-13 NOTE — PROGRESS NOTE ADULT - SUBJECTIVE AND OBJECTIVE BOX
[facility] Division of Hospital Medicine    Chief Complaint:  Angina     SUBJECTIVE / OVERNIGHT EVENTS:  Pt examined lying in  ed  s/p LHC with PCI RCA  Reports feeling better   Ovrnight telemetry with 4.15 sec pause and Mobitz 1 with intermittent bradycardic episodes  Does confirm that he has sleep apnea but never got his CPAP or perused treatment   Patient denies chest pain, SOB, abd pain, N/V, fever, chills, dysuria or any other complaints. All remainder ROS negative.     MEDICATIONS  (STANDING):  aspirin enteric coated 81 milliGRAM(s) Oral daily  atorvastatin 20 milliGRAM(s) Oral at bedtime  clopidogrel Tablet 75 milliGRAM(s) Oral daily  losartan 25 milliGRAM(s) Oral daily    MEDICATIONS  (PRN):  atropine Injectable 0.5 milliGRAM(s) IV Push once PRN HR <35 sustained        I&O's Summary    13 Aug 2022 07:01  -  13 Aug 2022 14:54  --------------------------------------------------------  IN: 320 mL / OUT: 0 mL / NET: 320 mL        PHYSICAL EXAM:  Vital Signs Last 24 Hrs  T(C): 36.5 (13 Aug 2022 10:01), Max: 36.8 (12 Aug 2022 15:30)  T(F): 97.7 (13 Aug 2022 10:01), Max: 98.2 (12 Aug 2022 15:30)  HR: 60 (13 Aug 2022 10:01) (55 - 78)  BP: 129/85 (13 Aug 2022 10:01) (119/75 - 150/92)  BP(mean): --  RR: 17 (13 Aug 2022 10:01) (16 - 18)  SpO2: 95% (13 Aug 2022 10:01) (95% - 99%)    Parameters below as of 13 Aug 2022 10:01  Patient On (Oxygen Delivery Method): room air            CONSTITUTIONAL: Non toxic appearing, lying in bed  ENMT: Moist oral mucosa, no pharyngeal injection or exudates; normal dentition  RESPIRATORY: Normal respiratory effort; lungs are clear to auscultation bilaterally  CARDIOVASCULAR: Regular rate and rhythm, normal S1 and S2, no murmur/rub/gallop; No lower extremity edema; Peripheral pulses are 2+ bilaterally  ABDOMEN: Nontender to palpation, normoactive bowel sounds, no rebound/guarding; No hepatosplenomegaly  MUSCLOSKELETAL:  Normal gait; no clubbing or cyanosis of digits; no joint swelling or tenderness to palpation  PSYCH: A+O to person, place, and time; affect appropriate  NEUROLOGY: CN 2-12 are intact and symmetric; no gross sensory deficits;   SKIN: No rashes; no palpable lesions    LABS:                        11.9   5.05  )-----------( 207      ( 13 Aug 2022 05:35 )             34.3     08-13    142  |  105  |  13.5  ----------------------------<  94  4.2   |  25.0  |  0.81    Ca    8.7      13 Aug 2022 05:35  Phos  4.2     08-13  Mg     2.2     08-13    TPro  6.0<L>  /  Alb  4.2  /  TBili  0.4  /  DBili  x   /  AST  18  /  ALT  20  /  AlkPhos  81  08-13    PT/INR - ( 12 Aug 2022 12:29 )   PT: 12.1 sec;   INR: 1.04 ratio         PTT - ( 12 Aug 2022 12:29 )  PTT:25.6 sec  CARDIAC MARKERS ( 12 Aug 2022 12:29 )  x     / <0.01 ng/mL / x     / x     / x              CAPILLARY BLOOD GLUCOSE            RADIOLOGY & ADDITIONAL TESTS:  Results Reviewed:   TTE pending

## 2022-08-13 NOTE — PROVIDER CONTACT NOTE (OTHER) - BACKGROUND
HX of CAD, 9 stents in the past, 1 stent placed in [facility] cath lab 8/12/22 to the RCA. Per H&P hx of cocaine use, last use 8/9/22.

## 2022-08-14 LAB
ANION GAP SERPL CALC-SCNC: 11 MMOL/L — SIGNIFICANT CHANGE UP (ref 5–17)
BUN SERPL-MCNC: 14.7 MG/DL — SIGNIFICANT CHANGE UP (ref 8–20)
CALCIUM SERPL-MCNC: 8.7 MG/DL — SIGNIFICANT CHANGE UP (ref 8.4–10.5)
CHLORIDE SERPL-SCNC: 101 MMOL/L — SIGNIFICANT CHANGE UP (ref 98–107)
CO2 SERPL-SCNC: 25 MMOL/L — SIGNIFICANT CHANGE UP (ref 22–29)
CREAT SERPL-MCNC: 0.79 MG/DL — SIGNIFICANT CHANGE UP (ref 0.5–1.3)
EGFR: 100 ML/MIN/1.73M2 — SIGNIFICANT CHANGE UP
GLUCOSE SERPL-MCNC: 99 MG/DL — SIGNIFICANT CHANGE UP (ref 70–99)
HCT VFR BLD CALC: 37.1 % — LOW (ref 39–50)
HGB BLD-MCNC: 12.7 G/DL — LOW (ref 13–17)
MAGNESIUM SERPL-MCNC: 2.1 MG/DL — SIGNIFICANT CHANGE UP (ref 1.6–2.6)
MCHC RBC-ENTMCNC: 32 PG — SIGNIFICANT CHANGE UP (ref 27–34)
MCHC RBC-ENTMCNC: 34.2 GM/DL — SIGNIFICANT CHANGE UP (ref 32–36)
MCV RBC AUTO: 93.5 FL — SIGNIFICANT CHANGE UP (ref 80–100)
PLATELET # BLD AUTO: 205 K/UL — SIGNIFICANT CHANGE UP (ref 150–400)
POTASSIUM SERPL-MCNC: 4.2 MMOL/L — SIGNIFICANT CHANGE UP (ref 3.5–5.3)
POTASSIUM SERPL-SCNC: 4.2 MMOL/L — SIGNIFICANT CHANGE UP (ref 3.5–5.3)
RBC # BLD: 3.97 M/UL — LOW (ref 4.2–5.8)
RBC # FLD: 11.9 % — SIGNIFICANT CHANGE UP (ref 10.3–14.5)
SODIUM SERPL-SCNC: 137 MMOL/L — SIGNIFICANT CHANGE UP (ref 135–145)
WBC # BLD: 5.65 K/UL — SIGNIFICANT CHANGE UP (ref 3.8–10.5)
WBC # FLD AUTO: 5.65 K/UL — SIGNIFICANT CHANGE UP (ref 3.8–10.5)

## 2022-08-14 PROCEDURE — 99232 SBSQ HOSP IP/OBS MODERATE 35: CPT

## 2022-08-14 PROCEDURE — 93306 TTE W/DOPPLER COMPLETE: CPT | Mod: 26

## 2022-08-14 RX ADMIN — ATORVASTATIN CALCIUM 40 MILLIGRAM(S): 80 TABLET, FILM COATED ORAL at 21:23

## 2022-08-14 RX ADMIN — CLOPIDOGREL BISULFATE 75 MILLIGRAM(S): 75 TABLET, FILM COATED ORAL at 11:48

## 2022-08-14 RX ADMIN — Medication 81 MILLIGRAM(S): at 11:48

## 2022-08-14 RX ADMIN — LOSARTAN POTASSIUM 25 MILLIGRAM(S): 100 TABLET, FILM COATED ORAL at 06:06

## 2022-08-14 NOTE — PROGRESS NOTE ADULT - SUBJECTIVE AND OBJECTIVE BOX
SHA GUTIERREZ  [id number]      Chief Complaint:  CP/SOB/CAD    Interval History:  Patient without c/o. Denies CP, SOB, palps.    Tele:  SR with bradycardia and varying HB o/n with 4 second pause all while sleeping.      aspirin enteric coated 81 milliGRAM(s) Oral daily  atorvastatin 40 milliGRAM(s) Oral at bedtime  atropine Injectable 0.5 milliGRAM(s) IV Push once PRN  clopidogrel Tablet 75 milliGRAM(s) Oral daily  losartan 25 milliGRAM(s) Oral daily    Physical Exam:  T(C): 36.7 (08-14-22 @ 16:00), Max: 36.7 (08-13-22 @ 20:43)  HR: 66 (08-14-22 @ 16:00) (66 - 76)  BP: 124/86 (08-14-22 @ 16:00) (110/80 - 124/86)  RR: 18 (08-14-22 @ 16:00) (16 - 18)  SpO2: 98% (08-14-22 @ 16:00) (96% - 98%)  Wt(kg): --  General: Comfortable in NAD  Neck: No JVD  CVS: nl s1s2, no s3  Pulm: CTA b/l  Abd: soft, non-tender  Ext: No c/c/e  Neuro A&O x3  Psych: Normal affect    I&O's Summary    13 Aug 2022 07:01  -  14 Aug 2022 07:00  --------------------------------------------------------  IN: 320 mL / OUT: 400 mL / NET: -80 mL      Labs:   14 Aug 2022 05:35    137    |  101    |  14.7   ----------------------------<  99     4.2     |  25.0   |  0.79     Ca    8.7        14 Aug 2022 05:35  Phos  4.2       13 Aug 2022 00:58  Mg     2.1       14 Aug 2022 05:35    TPro  6.0    /  Alb  4.2    /  TBili  0.4    /  DBili  x      /  AST  18     /  ALT  20     /  AlkPhos  81     13 Aug 2022 05:35                          12.7   5.65  )-----------( 205      ( 14 Aug 2022 05:35 )             37.1       8/14/22 ECHO Summary:   1. Normal left ventricular internal cavity size.   2. Normal global left ventricular systolic function.   3. Basal inferior segment is abnormal as described above.   4.Left ventricular ejection fraction, by visual estimation, is 50 to   55%.   5. Spectral Doppler shows impaired relaxation pattern of left   ventricular myocardial filling (Grade I diastolic dysfunction).   6. Mildly reduced RV systolic function.   7.The left atrium is normal in size.   8. The right atrium is normal in size.   9. Mild thickening of the anterior and posterior mitral valve leaflets.  10. Mild mitral valve regurgitation.  11. Mild tricuspid regurgitation.  12. Sclerotic aortic valvewith decreased opening.  13. Mild aortic regurgitation.  14. There is no evidence of pericardial effusion.    MD Yong Electronically signed on 8/14/2022 at 5:04:20 PM      Outpatient nuclear stress test (7/2020):  Small to moderate sized mid-basal inferolateral and basal inferior wall infarct with mild apoorva-infarct ischemia, LVEF 64%    Outpatient TTE (2/2020):  LVEF 55-60%, hypokinesis of the basal and mid inferolateral wall  Normal RV size and systolic function  Mild MR  No pericardial effusion    Cardiac Cath (2011):  LM: 20 % stenosis  Mid LAD: 0% at site of prior stent  Proximal LCx: 0% at site of prior stent  Distal LCx: 100% stenosis  Mid RCA: 0% at site of prior stent    Cardiac Cath 8/12/22: ISR prox RCA treated with OJ x 1, mLCx ISR 80% and dLCx 80%, LAD mild nonobstructive    Assessment:    Patient is a  62y Male with CAD s/p multiple PCI (last in 2017 per notes), HLD, knee replacement here with CP/SOB.   -BW unremarkable, first set trops negative  -Has been taking his ASA/plavix, but not on statin  -Symptoms concerning for angina, similar to prior dyspnea that was relieved with revascularization.   -Now s/p cath with severe 2 VD.  S/p PCI of RCA with need for PCI of  sequential LCX.  Will plan Monday.  -Tele with darwin HB and jose david o/n.  Patient with known untreated YELITZA.  Needs Rx of YELITZA.  No indication for PPM now.  - Echo showing old inferior area of hypokinesis, but otherwise normal LVEF and is unchanged.    Plan:   1. Plan for PCI LCX Monday.Discussed with pt, who understands and agrees  2. Continue ASA/plavix, restart trial of statin  3. F/u echo.  4. Needs Rx for YELITZA.  F/u with pulm.  5. Avoid BB with cocaine use.  Enforce need to stop.

## 2022-08-14 NOTE — PROGRESS NOTE ADULT - ASSESSMENT
62 M  with CAD s/p PCI, active cocaine use admitted from cardiologists office for SOB and decreasing exercise tolerance with intermittent CP on exertion now s/p LHC with PCI RCA with overnight bradycardia pause     Angina with CAD   Continue Telemetry monitoring   S/p PCI RCA  Continue ASA/Plavix   TTE pending   ASA/Plavix and Statin (no BB due to active cocaine use)    HLD  Markedly elevated lipid panel  Tolerating Atorvastatin 20, increase to 40 mg QHS   Would recommend repeat in 3-6 months and if still > 50% of current Lipoprotein A , may benefit from Repatha / Praluent (PCSK9 inh)    Mobitz 1 with bradycardia and 4 sec pause   Appreciate Cardiology recs, likely driven by pts YELITZA   Non compliant with f/u and never started use  Unable to tolerate nocturnal CPAP, will reduce to 6mmHg and monitor tolerCPAP QHS (start at 10 and titrate as needed)  PPM not indicated at this time     Polysubstance use   Utox pos for BDZ/ Cocaine  Cessation counseling done,   Avoid B-Blockers for now           HCP : Brother (Khanh)

## 2022-08-14 NOTE — PROGRESS NOTE ADULT - SUBJECTIVE AND OBJECTIVE BOX
[facility] Division of Hospital Medicine    Chief Complaint:  Angina     SUBJECTIVE / OVERNIGHT EVENTS:  Pt examined lying in bed,   Overnight did not tolerate CPAP, states 'it felt like a fan blowing right in my face '  Pressures reduced and pt strongly encouraged to use as again telemetry with varying arrhythmias likely driven by sleep apnea    Does report signifcat improvement in dyspnea, able to make 3 circuits around medical floor w/o feeling 'winded'  Patient denies chest pain, SOB, abd pain, N/V, fever, chills, dysuria or any other complaints. All remainder ROS negative.     MEDICATIONS  (STANDING):  aspirin enteric coated 81 milliGRAM(s) Oral daily  atorvastatin 20 milliGRAM(s) Oral at bedtime  clopidogrel Tablet 75 milliGRAM(s) Oral daily  losartan 25 milliGRAM(s) Oral daily    MEDICATIONS  (PRN):  atropine Injectable 0.5 milliGRAM(s) IV Push once PRN HR <35 sustained          PHYSICAL EXAM:  Vital Signs Last 24 Hrs  T(C): 36.5 (14 Aug 2022 06:05), Max: 36.7 (13 Aug 2022 20:43)  T(F): 97.7 (14 Aug 2022 06:05), Max: 98.1 (13 Aug 2022 20:43)  HR: 68 (14 Aug 2022 06:05) (68 - 70)  BP: 110/80 (14 Aug 2022 06:05) (110/80 - 130/70)  BP(mean): --  RR: 16 (14 Aug 2022 06:05) (16 - 18)  SpO2: 96% (14 Aug 2022 06:05) (96% - 98%)    Parameters below as of 14 Aug 2022 06:05  Patient On (Oxygen Delivery Method): room air                CONSTITUTIONAL: Non toxic appearing, lying in bed  ENMT: Moist oral mucosa, no pharyngeal injection or exudates; normal dentition  RESPIRATORY: Normal respiratory effort; lungs are clear to auscultation bilaterally  CARDIOVASCULAR: Regular rate and rhythm, normal S1 and S2, no murmur/rub/gallop; No lower extremity edema; Peripheral pulses are 2+ bilaterally  ABDOMEN: Nontender to palpation, normoactive bowel sounds, no rebound/guarding; No hepatosplenomegaly  MUSCLOSKELETAL:  Normal gait; no clubbing or cyanosis of digits; no joint swelling or tenderness to palpation  PSYCH: A+O to person, place, and time; affect appropriate  NEUROLOGY: CN 2-12 are intact and symmetric; no gross sensory deficits;   SKIN: No rashes; no palpable lesions    LABS:                        11.9   5.05  )-----------( 207      ( 13 Aug 2022 05:35 )             34.3     08-13    142  |  105  |  13.5  ----------------------------<  94  4.2   |  25.0  |  0.81    Ca    8.7      13 Aug 2022 05:35  Phos  4.2     08-13  Mg     2.2     08-13    TPro  6.0<L>  /  Alb  4.2  /  TBili  0.4  /  DBili  x   /  AST  18  /  ALT  20  /  AlkPhos  81  08-13    PT/INR - ( 12 Aug 2022 12:29 )   PT: 12.1 sec;   INR: 1.04 ratio         PTT - ( 12 Aug 2022 12:29 )  PTT:25.6 sec  CARDIAC MARKERS ( 12 Aug 2022 12:29 )  x     / <0.01 ng/mL / x     / x     / x              CAPILLARY BLOOD GLUCOSE            RADIOLOGY & ADDITIONAL TESTS:  Results Reviewed:   TTE pending

## 2022-08-15 LAB
ANION GAP SERPL CALC-SCNC: 12 MMOL/L — SIGNIFICANT CHANGE UP (ref 5–17)
BUN SERPL-MCNC: 17.3 MG/DL — SIGNIFICANT CHANGE UP (ref 8–20)
CALCIUM SERPL-MCNC: 9.1 MG/DL — SIGNIFICANT CHANGE UP (ref 8.4–10.5)
CHLORIDE SERPL-SCNC: 103 MMOL/L — SIGNIFICANT CHANGE UP (ref 98–107)
CO2 SERPL-SCNC: 26 MMOL/L — SIGNIFICANT CHANGE UP (ref 22–29)
CREAT SERPL-MCNC: 1.03 MG/DL — SIGNIFICANT CHANGE UP (ref 0.5–1.3)
EGFR: 82 ML/MIN/1.73M2 — SIGNIFICANT CHANGE UP
GLUCOSE SERPL-MCNC: 102 MG/DL — HIGH (ref 70–99)
HCT VFR BLD CALC: 35.8 % — LOW (ref 39–50)
HGB BLD-MCNC: 12.3 G/DL — LOW (ref 13–17)
MAGNESIUM SERPL-MCNC: 2 MG/DL — SIGNIFICANT CHANGE UP (ref 1.6–2.6)
MCHC RBC-ENTMCNC: 32.1 PG — SIGNIFICANT CHANGE UP (ref 27–34)
MCHC RBC-ENTMCNC: 34.4 GM/DL — SIGNIFICANT CHANGE UP (ref 32–36)
MCV RBC AUTO: 93.5 FL — SIGNIFICANT CHANGE UP (ref 80–100)
PLATELET # BLD AUTO: 213 K/UL — SIGNIFICANT CHANGE UP (ref 150–400)
POTASSIUM SERPL-MCNC: 4.3 MMOL/L — SIGNIFICANT CHANGE UP (ref 3.5–5.3)
POTASSIUM SERPL-SCNC: 4.3 MMOL/L — SIGNIFICANT CHANGE UP (ref 3.5–5.3)
RBC # BLD: 3.83 M/UL — LOW (ref 4.2–5.8)
RBC # FLD: 11.9 % — SIGNIFICANT CHANGE UP (ref 10.3–14.5)
SODIUM SERPL-SCNC: 140 MMOL/L — SIGNIFICANT CHANGE UP (ref 135–145)
TROPONIN T SERPL-MCNC: <0.01 NG/ML — SIGNIFICANT CHANGE UP (ref 0–0.06)
WBC # BLD: 4.91 K/UL — SIGNIFICANT CHANGE UP (ref 3.8–10.5)
WBC # FLD AUTO: 4.91 K/UL — SIGNIFICANT CHANGE UP (ref 3.8–10.5)

## 2022-08-15 PROCEDURE — 99152 MOD SED SAME PHYS/QHP 5/>YRS: CPT

## 2022-08-15 PROCEDURE — 93010 ELECTROCARDIOGRAM REPORT: CPT

## 2022-08-15 PROCEDURE — 99222 1ST HOSP IP/OBS MODERATE 55: CPT

## 2022-08-15 PROCEDURE — 99233 SBSQ HOSP IP/OBS HIGH 50: CPT

## 2022-08-15 PROCEDURE — 92928 PRQ TCAT PLMT NTRAC ST 1 LES: CPT | Mod: LC

## 2022-08-15 RX ADMIN — LOSARTAN POTASSIUM 25 MILLIGRAM(S): 100 TABLET, FILM COATED ORAL at 05:21

## 2022-08-15 RX ADMIN — CLOPIDOGREL BISULFATE 75 MILLIGRAM(S): 75 TABLET, FILM COATED ORAL at 11:26

## 2022-08-15 RX ADMIN — Medication 81 MILLIGRAM(S): at 11:26

## 2022-08-15 RX ADMIN — ATORVASTATIN CALCIUM 40 MILLIGRAM(S): 80 TABLET, FILM COATED ORAL at 20:49

## 2022-08-15 NOTE — CONSULT NOTE ADULT - NS ATTEND AMEND GEN_ALL_CORE FT
Pt reports a history of known sleep apnea for many years, and has previously been noted to have nocturnal jose david arrhythmias. He denies any history of dizziness or syncope, and has not been noted to have any daytime bradycardia events. ECG with sinus rhythm and narrow QRS without any significant conduction disease.   It is likely that his AV block is secondary to vagal tone and effects of sleep apnea overnight. I discussed with the pt that he should have ambulatory monitoring (with event recorder or possibly ILR) for ongoing monitoring, but he defered this, and would like to followup with Dr Nuñez.   We reviewed the importance of evaluation and treatment for sleep apnea, and he expressed agreement to this. will help arrange outpt sleep study and referral to sleep medicine.

## 2022-08-15 NOTE — PROGRESS NOTE ADULT - SUBJECTIVE AND OBJECTIVE BOX
SHA GUTIERREZ  [id number]      Chief Complaint:  CP    Interval History:  for staged PCI today    Tele:  2:1 av block      aspirin enteric coated 81 milliGRAM(s) Oral daily  atorvastatin 40 milliGRAM(s) Oral at bedtime  atropine Injectable 0.5 milliGRAM(s) IV Push once PRN  clopidogrel Tablet 75 milliGRAM(s) Oral daily  losartan 25 milliGRAM(s) Oral daily          Physical Exam:  T(C): 36.7 (08-15-22 @ 15:30), Max: 36.8 (08-15-22 @ 05:16)  HR: 65 (08-15-22 @ 15:30) (58 - 75)  BP: 144/74 (08-15-22 @ 15:30) (110/64 - 144/74)  RR: 18 (08-15-22 @ 15:30) (17 - 18)  SpO2: 98% (08-15-22 @ 15:30) (96% - 98%)  Wt(kg): --  General: Comfortable in NAD  Neck: No JVD  CVS: nl s1s2, no s3  Pulm: CTA b/l  Abd: soft, non-tender  Ext: No c/c/e  Neuro A&O x3  Psych: Normal affect      Labs:   15 Aug 2022 02:16    140    |  103    |  17.3   ----------------------------<  102    4.3     |  26.0   |  1.03     Ca    9.1        15 Aug 2022 02:16  Mg     2.0       15 Aug 2022 02:16                            12.3   4.91  )-----------( 213      ( 15 Aug 2022 02:16 )             35.8       CARDIAC MARKERS ( 15 Aug 2022 02:16 )  x     / <0.01 ng/mL / x     / x     / x        8/14/22 ECHO Summary:   1. Normal left ventricular internal cavity size.   2. Normal global left ventricular systolic function.   3. Basal inferior segment is abnormal as described above.   4.Left ventricular ejection fraction, by visual estimation, is 50 to   55%.   5. Spectral Doppler shows impaired relaxation pattern of left   ventricular myocardial filling (Grade I diastolic dysfunction).   6. Mildly reduced RV systolic function.   7.The left atrium is normal in size.   8. The right atrium is normal in size.   9. Mild thickening of the anterior and posterior mitral valve leaflets.  10. Mild mitral valve regurgitation.  11. Mild tricuspid regurgitation.  12. Sclerotic aortic valvewith decreased opening.  13. Mild aortic regurgitation.  14. There is no evidence of pericardial effusion.      Cardiac Cath 8/12/22: ISR prox RCA treated with OJ x 1, mLCx ISR 80% and dLCx 80%, LAD mild nonobstructive    Assessment:  62y Male with CAD s/p multiple PCI (last in 2017 per notes), HLD, knee replacement here with CP/SOB.   -BW unremarkable, first set trops negative  -Has been taking his ASA/plavix, but not on statin  -Symptoms concerning for angina, similar to prior dyspnea that was relieved with revascularization.   -Now s/p cath with severe 2 VD.  S/p PCI of RCA with need for PCI of  sequential LCX.  Will plan Monday.  -Tele with carying HB and jose david o/n.  Patient with known untreated YELITZA.  Needs Rx of YELITZA.  No indication for PPM now.  - Echo showing old inferior area of hypokinesis, but otherwise normal LVEF and is unchanged.    Plan:   1. Plan for PCI LCX today.   2. Continue ASA/plavix, restart trial of statin  3. Noted on telemetry episodes of 2:1 AV block at during nighttime, triggered by YELITZA? EP consulted  4. Likely DC from cardiology standpoint if no additional intervention by EP   5. Outpatient follow up in cardiology/pulmonary and PCP clinic

## 2022-08-15 NOTE — CHART NOTE - NSCHARTNOTEFT_GEN_A_CORE
Medicine PA-  Cd for pt that episodic 2:1 block and jose david 30's on monitor for few seconds, then back to SR 60's. Pt admitted with CP/ SOB and has had similar episodes on monitor this admission. He is asymptomatic and denies CP/ SOB.    Monitor: SR 50-60  Vital Signs Last 24 Hrs  T(C): 36.4 (14 Aug 2022 21:20), Max: 36.7 (14 Aug 2022 16:00)  T(F): 97.6 (14 Aug 2022 21:20), Max: 98 (14 Aug 2022 16:00)  HR: 58 (15 Aug 2022 00:50) (58 - 76)  BP: 115/72 (15 Aug 2022 00:50) (110/64 - 134/83)  BP(mean): --  RR: 18 (14 Aug 2022 21:20) (16 - 18)  SpO2: 98% (14 Aug 2022 21:20) (96% - 98%)                          12.3   4.91  )-----------( 213      ( 15 Aug 2022 02:16 )             35.8   08-15    140  |  103  |  17.3  ----------------------------<  102<H>  4.3   |  26.0  |  1.03    Ca    9.1      15 Aug 2022 02:16  Mg     2.0     08-15    Trop- 0.01    >CAD, Mobitz 1 w/ jose david  -pacer pads on   -continue to monitor  - call PA if clinical status changes

## 2022-08-15 NOTE — PROGRESS NOTE ADULT - ASSESSMENT
62 M  with CAD s/p PCI, active cocaine use admitted from cardiologists office for SOB and decreasing exercise tolerance with intermittent CP on exertion now s/p LHC with PCI RCA with overnight bradycardia pause     Angina with CAD   Continue Telemetry monitoring   S/p PCI RCA  Continue ASA/Plavix   TTE pending   ASA/Plavix and Statin (no BB due to active cocaine use)    HLD  Markedly elevated lipid panel  Tolerating Atorvastatin 20, increase to 40 mg QHS   Would recommend repeat in 3-6 months and if still > 50% of current Lipoprotein A , may benefit from Repatha / Praluent (PCSK9 inh)    Mobitz 1 with bradycardia and 4 sec pause   Appreciate Cardiology recs, likely driven by pts YELITZA   Non compliant with f/u and never started use  Unable to tolerate nocturnal CPAP, will reduce to 6mmHg and monitor tolerCPAP QHS (start at 10 and titrate as needed)  PPM not indicated at this time     Polysubstance use   Utox pos for BDZ/ Cocaine  Cessation counseling done,   Avoid B-Blockers for now           HCP : Brother (Khanh)    62 M  with CAD s/p PCI, active cocaine use admitted from cardiologists office for SOB and decreasing exercise tolerance with intermittent CP on exertion now s/p C with PCI RCA with overnight bradycardia pause     Angina with CAD   Continue Telemetry monitoring   Undergoing staged PCI, planned for 2nd today  Continue ASA/Plavix   TTE reviewed,   ASA/Plavix and Statin (no BB due to active cocaine use)    HLD  Markedly elevated lipid panel  Tolerating Atorvastatin 40   Would recommend repeat in 3-6 months and if still > 50% of current Lipoprotein A , may benefit from Repatha / Praluent (PCSK9 inh)    Mobitz 1 with bradycardia and 4 sec pause   Appreciate Cardiology recs, likely driven by pts YELITZA   Non compliant with f/u and never started use  Unable to tolerate nocturnal CPAP, reduced  from 10 to 6 mmHg however per pt was never offered with decreased settings.   RN / RT instructed to at least offer tonight   PPM not indicated at this time per cardiology     Polysubstance use   Utox pos for BDZ/ Cocaine  Cessation counseling done,   Avoid B-Blockers for now         DVT ppx : On DAPT and ambulating (4 laps in am) around 4 tower w/o difficulty     HCP : Brother Hayley)

## 2022-08-15 NOTE — CONSULT NOTE ADULT - SUBJECTIVE AND OBJECTIVE BOX
[facility]  [facility]/Health system Faculty Practice   Office: [address] [facility], [place] [address]  Telephone: [phone] Fax:[phone]      HPI:     Pt is a 62 year old male, PMHx significant for CAD s/p multiple PCIs, HLD, YELITZA (diagnosed 15 years ago, not on nocturnal CPAP) who presented to the ED with complaints of JOHNSON x 2 months which progressively worsened over the past several days.  Pt reported decreased exercise tolerance with inability to climb 2 flights of stairs without becoming dyspneic. Pt went to his primary cardiologist for an evaluation and was told to come to the ED for further evaluation. Pt had a   LHC performed 8/12/22 which revealed ISR prox RCA (treated with OJ x 1), mLCx ISR 80% and dLCx 80%, LAD mild nonobstructive. Pt currently in CCL holding room bed #13 in anticipation of PCI to circumflex artery. EP consulted at this time as pt noted with 2:1 AVB with pauses up to 4 seconds while sleeping.     Pt reports a known history of intermittent heart block while sleeping, which he contributes to a remote history of sleep apnea. Pt stating "every time I go to the hospital they tell me my heart skips beats". Pt was prescribed a CPAP machine in the past which he states he could not tolerate and he never followed up for reassessment. 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 Pt reports never having any symptomatic episodes of dizziness, syncope, or lethargy. Telemetry review revealed intermittent 2:1 AVB with pause up to 4 seconds overnight, as well as episodes of high degree AVB. Pt reports no symptoms during these episodes.     Pt reports significant improvement in his JOHNSON after receiving his RCA stent and currently reports no complaints. Pt denies chest pain, SOB, palpitations, dizziness, fatigue, HA, fever, chills.         EKG: Sinus rhythm with intact conduction      PAST MEDICAL & SURGICAL HISTORY:  CAD (Coronary Artery Disease)      Hyperlipidemia      H/O heart artery stent      Osteoarthritis of right knee      s/p Angioplasty with Stent      History of Back Surgery  fusion      Fracture, Femur  right      H/O hernia repair      H/O total knee replacement, right      REVIEW OF SYSTEMS:    CONSTITUTIONAL: No fever, weight loss, or fatigue  EYES: No visual disturbances  NECK: No pain or stiffness  RESPIRATORY: No cough, wheezing, chills or hemoptysis; No shortness of breath  CARDIOVASCULAR: see HPI  GASTROINTESTINAL: No abdominal or epigastric pain. No nausea, vomiting, or hematemesis; No diarrhea or constipation. No melena or hematochezia.  NEUROLOGICAL: No headaches, memory loss, loss of strength, numbness, or tremors  SKIN: No itching, burning, rashes, or lesions   LYMPH NODES: No enlarged glands  ENDOCRINE: No heat or cold intolerance; No hair loss  PSYCHIATRIC: No depression, anxiety, mood swings, or difficulty sleeping  HEME/LYMPH: No easy bruising, or bleeding gums      MEDICATIONS  (STANDING):  aspirin enteric coated 81 milliGRAM(s) Oral daily  atorvastatin 40 milliGRAM(s) Oral at bedtime  clopidogrel Tablet 75 milliGRAM(s) Oral daily  losartan 25 milliGRAM(s) Oral daily    MEDICATIONS  (PRN):  atropine Injectable 0.5 milliGRAM(s) IV Push once PRN HR <35 sustained      Allergies    No Known Allergies    Intolerances      SOCIAL HISTORY:    FAMILY HISTORY:  Family history of heart attack (Father)  father, dies at 61 yo    FH: CABG (coronary artery bypass surgery) (Father)  father    FHx: stroke (Mother)  mother    Occasional alcohol use    Denies smoking    Occasional cocaine use    Vital Signs Last 24 Hrs  T(C): 36.7 (15 Aug 2022 12:56), Max: 36.8 (15 Aug 2022 05:16)  T(F): 98 (15 Aug 2022 12:56), Max: 98.3 (15 Aug 2022 05:16)  HR: 75 (15 Aug 2022 12:56) (58 - 75)  BP: 121/75 (15 Aug 2022 12:56) (110/64 - 134/83)  BP(mean): --  RR: 17 (15 Aug 2022 12:56) (17 - 18)  SpO2: 96% (15 Aug 2022 12:56) (96% - 98%)    Parameters below as of 15 Aug 2022 12:56  Patient On (Oxygen Delivery Method): room air        Physical Exam:  Constitutional: AAOx3, NAD  Neck: supple, No JVD  Cardiovascular: +S1S2 RRR, no murmurs, rubs, gallops   Pulmonary: CTA b/l, unlabored, no wheezes, rales. rhonci  Abdomen: +BS, soft NTND  Extremities: no edema b/l, +distal pulses b/l  Neuro: non focal, speech clear, NAGY x 4    LABS:                        12.3   4.91  )-----------( 213      ( 15 Aug 2022 02:16 )             35.8   08-15    140  |  103  |  17.3  ----------------------------<  102<H>  4.3   |  26.0  |  1.03    Ca    9.1      15 Aug 2022 02:16  Mg     2.0     08-15      CARDIAC MARKERS ( 15 Aug 2022 02:16 )  x     / <0.01 ng/mL / x     / x     / x              RADIOLOGY & ADDITIONAL STUDIES:    < from: Xray Chest 1 View- PORTABLE-Urgent (08.12.22 @ 13:11) >  MPRESSION: No acute finding or change.      Assessment:      62 year old male, PMHx significant for CAD s/p multiple PCIs, HLD, YELITZA (diagnosed 15 years ago, not on nocturnal CPAP) who presented to the ED with complaints of JOHNSON x 2, now s/p OJ to RCA, pending LCX PCI, with episodes of            [place]  [facility]   Office: [address] [facility], [place] [address]  Telephone: [phone] Fax:[phone]      HPI:     Pt is a 62 year old male, PMHx significant for CAD s/p multiple PCIs, HLD, YELITZA (diagnosed 15 years ago, not on nocturnal CPAP) who presented to the ED with complaints of JOHNSON x 2 months which progressively worsened over the past several days.  Pt reported decreased exercise tolerance with inability to climb 2 flights of stairs without becoming dyspneic. Pt went to his primary cardiologist for an evaluation and was told to come to the ED for further evaluation. Pt had a   LHC performed 8/12/22 which revealed ISR prox RCA (treated with OJ x 1), mLCx ISR 80% and dLCx 80%, LAD mild nonobstructive. Pt currently in CCL holding room bed #13 in anticipation of PCI to circumflex artery. EP consulted at this time as pt noted with 2:1 AVB with pauses up to 4 seconds while sleeping.     Pt reports a known history of intermittent heart block while sleeping, which he contributes to a remote history of sleep apnea. Pt stating "every time I go to the hospital they tell me my heart skips beats". Pt was prescribed a CPAP machine in the past which he states he could not tolerate and he never followed up for reassessment. 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 Pt reports never having any symptomatic episodes of dizziness, syncope, or lethargy. Telemetry review revealed intermittent 2:1 AVB with an episode of high degree AVB with pause up to 4 seconds overnight. Pt reports no symptoms during these episodes.     Pt reports significant improvement in his JOHNSON after receiving his RCA stent and currently reports no complaints. Pt denies chest pain, SOB, palpitations, dizziness, fatigue, HA, fever, chills.         EKG: Sinus rhythm with intact conduction      PAST MEDICAL & SURGICAL HISTORY:  CAD (Coronary Artery Disease)      Hyperlipidemia      H/O heart artery stent      Osteoarthritis of right knee      s/p Angioplasty with Stent      History of Back Surgery  fusion      Fracture, Femur  right      H/O hernia repair      H/O total knee replacement, right      REVIEW OF SYSTEMS:    CONSTITUTIONAL: No fever, weight loss, or fatigue  EYES: No visual disturbances  NECK: No pain or stiffness  RESPIRATORY: No cough, wheezing, chills or hemoptysis; No shortness of breath  CARDIOVASCULAR: see HPI  GASTROINTESTINAL: No abdominal or epigastric pain. No nausea, vomiting, or hematemesis; No diarrhea or constipation. No melena or hematochezia.  NEUROLOGICAL: No headaches, memory loss, loss of strength, numbness, or tremors  SKIN: No itching, burning, rashes, or lesions   LYMPH NODES: No enlarged glands  ENDOCRINE: No heat or cold intolerance; No hair loss  PSYCHIATRIC: No depression, anxiety, mood swings, or difficulty sleeping  HEME/LYMPH: No easy bruising, or bleeding gums      MEDICATIONS  (STANDING):  aspirin enteric coated 81 milliGRAM(s) Oral daily  atorvastatin 40 milliGRAM(s) Oral at bedtime  clopidogrel Tablet 75 milliGRAM(s) Oral daily  losartan 25 milliGRAM(s) Oral daily    MEDICATIONS  (PRN):  atropine Injectable 0.5 milliGRAM(s) IV Push once PRN HR <35 sustained      Allergies    No Known Allergies    Intolerances      SOCIAL HISTORY:    FAMILY HISTORY:  Family history of heart attack (Father)  father, dies at 59 yo    FH: CABG (coronary artery bypass surgery) (Father)  father    FHx: stroke (Mother)  mother    Occasional alcohol use    Denies smoking    Occasional cocaine use    Vital Signs Last 24 Hrs  T(C): 36.7 (15 Aug 2022 12:56), Max: 36.8 (15 Aug 2022 05:16)  T(F): 98 (15 Aug 2022 12:56), Max: 98.3 (15 Aug 2022 05:16)  HR: 75 (15 Aug 2022 12:56) (58 - 75)  BP: 121/75 (15 Aug 2022 12:56) (110/64 - 134/83)  BP(mean): --  RR: 17 (15 Aug 2022 12:56) (17 - 18)  SpO2: 96% (15 Aug 2022 12:56) (96% - 98%)    Parameters below as of 15 Aug 2022 12:56  Patient On (Oxygen Delivery Method): room air        Physical Exam:  Constitutional: AAOx3, NAD  Neck: supple, No JVD  Cardiovascular: +S1S2 RRR, no murmurs, rubs, gallops   Pulmonary: CTA b/l, unlabored, no wheezes, rales. rhonci  Abdomen: +BS, soft NTND  Extremities: no edema b/l, +distal pulses b/l  Neuro: non focal, speech clear, NAGY x 4    LABS:                        12.3   4.91  )-----------( 213      ( 15 Aug 2022 02:16 )             35.8   08-15    140  |  103  |  17.3  ----------------------------<  102<H>  4.3   |  26.0  |  1.03    Ca    9.1      15 Aug 2022 02:16  Mg     2.0     08-15      CARDIAC MARKERS ( 15 Aug 2022 02:16 )  x     / <0.01 ng/mL / x     / x     / x              RADIOLOGY & ADDITIONAL STUDIES:    < from: Xray Chest 1 View- PORTABLE-Urgent (08.12.22 @ 13:11) >  MPRESSION: No acute finding or change.      Assessment:        62 year old male, PMHx significant for CAD s/p multiple PCIs, HLD, YELITZA (diagnosed 15 years ago, not on nocturnal CPAP) who presented to the ED with complaints of JOHNSON x 2, now s/p OJ to RCA, pending LCX PCI, with episodes of asymptomatic nocturnal intermittent 2:1 AVB and high grade AVB.     Telemetry monitoring revealed intermittent 2:1 AVB with an episode of high degree AVB while sleeping with pause of 4 seconds. Pt has remained asymptomatic throughout admission and reports no history of dizziness / syncopal events. Pt adamantly refusing any further inpatient cardiac monitoring and would like to arrange follow up with his primary cardiologist to discuss further interventions      Plan  1) Asymptomatic nocturnal high degree AVB with intermittent 2:1 AVB  - Episodes of IL lengthening and P-P lengthening consistent with increased vagal tone in the setting of sleep apnea.   - Pt refusing any further inpatient monitoring  - Outpatient event monitor should be arranged    2) Remote history of YELITZA  - Likely contributing to conduction disease   - outpatient sleep study to be arranged    3) CAD  - s/p PCI to RCA  - pending PCI to LCX  - further care as per primary cards     Case discussed with Dr. Pradhan

## 2022-08-15 NOTE — PROGRESS NOTE ADULT - SUBJECTIVE AND OBJECTIVE BOX
Department of Cardiology                                                                  [facility]/[place] [address] [address] [facility]                                                            Telephone: [phone]. Fax:[phone]                                                                             Cardiology Pre/Post Cath Note      Cardiology Follow up Pt of Dr Alcantara   Update: No acute events overnight, NAD and HDS.   Plan [facility]      Symptoms:        Angina (Class): CCS III        Ischemic Symptoms: SOB    Assessment of LVEF:       EF: 55%       Assessed by: TTE       Date: Basal inferior dyskinesis       Prior Cardiac Interventions: yes Multiple          Risk Assessments:  ASA: 2  Mallampati: 2  Bleeding Risk: 2  Creatinine: 1.03  GFR: 82     Antianginal Therapies:        Beta Blockers:         Calcium Channel Blockers:        Long Acting Nitrates:        Ranexa:     	  MEDICATIONS:  losartan 25 milliGRAM(s) Oral daily  atropine Injectable 0.5 milliGRAM(s) IV Push once PRN  atorvastatin 40 milliGRAM(s) Oral at bedtime  aspirin enteric coated 81 milliGRAM(s) Oral daily  clopidogrel Tablet 75 milliGRAM(s) Oral daily    ROS: as stated above, otherwise negative    PHYSICAL EXAM:  Constitutional: A & O x 3  HEENT:   Normal oral mucosa, PERRL, EOMI	  Cardiovascular: Normal S1 S2, No JVD, No murmurs  Respiratory: Lungs clear to auscultation	  Gastrointestinal:  Soft, Non-tender, + BS	  Skin: No rashes, No ecchymoses, No cyanosis  Neurologic: Non-focal  Extremities: Normal range of motion, no edema  Vascular: Peripheral pulses palpable + bilaterally      T(C): 36.7 (08-15-22 @ 12:56), Max: 36.8 (08-15-22 @ 05:16)  HR: 75 (08-15-22 @ 12:56) (58 - 75)  BP: 121/75 (08-15-22 @ 12:56) (110/64 - 134/83)  RR: 17 (08-15-22 @ 12:56) (17 - 18)  SpO2: 96% (08-15-22 @ 12:56) (96% - 98%)      14 Aug 2022 07:01  -  15 Aug 2022 07:00  --------------------------------------------------------  IN: 150 mL / OUT: 400 mL / NET: -250 mL          TELEMETRY: 	Sinus 83       ECG:  	Sinus PAC    LABS:	 	               12.3   4.91  )-----------( 213      ( 15 Aug 2022 02:16 )             35.8     08-15    140  |  103  |  17.3  ----------------------------<  102<H>  4.3   |  26.0  |  1.03    Ca    9.1      15 Aug 2022 02:16  Mg     2.0     08-15        Tnl:        A/P:  61 yo male CAD multiple PCI in the past, lost to follow up, stopped Statins as well. Complaints of JOHNSON "feels like last time when my stents were clogged". He present in HDS NAD to cath lab for cardiac cath. Troponin negative Ef 55%. Staged to LCX.        #Pre cath  -plan for LHC via RA   -patient seen and examined  -confirmed appropriate NPO duration  -ECG and Labs reviewed  -Aspirin 81mg and plavix pre cath.   -procedure discussed with patient; risks and benefits explained, questions answered  -consent obtained by attending IC    #cocaine use   - counseled on cocaine use      #HDL   continue statin

## 2022-08-15 NOTE — PROGRESS NOTE ADULT - SUBJECTIVE AND OBJECTIVE BOX
[facility] Division of Hospital Medicine    Chief Complaint:  Angina     SUBJECTIVE / OVERNIGHT EVENTS:  Pt examined lying in bed,   Again overnight pt with asymptomatic bradycardia and varying HB.   CPAP was reordered at 6 to improve ability to tolerate, however per pt was never offered.   Planned for 2nd part of staged PCI today   Patient denies chest pain, SOB, abd pain, N/V, fever, chills, dysuria or any other complaints. All remainder ROS negative.     MEDICATIONS  (STANDING):  aspirin enteric coated 81 milliGRAM(s) Oral daily  atorvastatin 20 milliGRAM(s) Oral at bedtime  clopidogrel Tablet 75 milliGRAM(s) Oral daily  losartan 25 milliGRAM(s) Oral daily    MEDICATIONS  (PRN):  atropine Injectable 0.5 milliGRAM(s) IV Push once PRN HR <35 sustained          PHYSICAL EXAM:  Vital Signs Last 24 Hrs  T(C): 36.7 (15 Aug 2022 12:56), Max: 36.8 (15 Aug 2022 05:16)  T(F): 98 (15 Aug 2022 12:56), Max: 98.3 (15 Aug 2022 05:16)  HR: 75 (15 Aug 2022 12:56) (58 - 75)  BP: 121/75 (15 Aug 2022 12:56) (110/64 - 134/83)  BP(mean): --  RR: 17 (15 Aug 2022 12:56) (17 - 18)  SpO2: 96% (15 Aug 2022 12:56) (96% - 98%)    Parameters below as of 15 Aug 2022 12:56  Patient On (Oxygen Delivery Method): room air        CONSTITUTIONAL: Non toxic appearing, lying in bed  ENMT: Moist oral mucosa, no pharyngeal injection or exudates; normal dentition  RESPIRATORY: Normal respiratory effort; lungs are clear to auscultation bilaterally  CARDIOVASCULAR: Regular rate and rhythm, normal S1 and S2, no murmur/rub/gallop; No lower extremity edema; Peripheral pulses are 2+ bilaterally  ABDOMEN: Nontender to palpation, normoactive bowel sounds, no rebound/guarding; No hepatosplenomegaly  MUSCLOSKELETAL:  Normal gait; no clubbing or cyanosis of digits; no joint swelling or tenderness to palpation  PSYCH: A+O to person, place, and time; affect appropriate  NEUROLOGY: CN 2-12 are intact and symmetric; no gross sensory deficits;   SKIN: No rashes; no palpable lesions      LABS:                        12.3   4.91  )-----------( 213      ( 15 Aug 2022 02:16 )             35.8   08-15    140  |  103  |  17.3  ----------------------------<  102<H>  4.3   |  26.0  |  1.03    Ca    9.1      15 Aug 2022 02:16  Mg     2.0     08-15           PTT - ( 12 Aug 2022 12:29 )  PTT:25.6 sec  CARDIAC MARKERS ( 12 Aug 2022 12:29 )  x     / <0.01 ng/mL / x     / x     / x              CAPILLARY BLOOD GLUCOSE            RADIOLOGY & ADDITIONAL TESTS:  Results Reviewed:   TTE pending                                         [facility] Division of Hospital Medicine    Chief Complaint:  Angina     SUBJECTIVE / OVERNIGHT EVENTS:  Pt examined lying in bed,   Again overnight pt with asymptomatic bradycardia and varying HB.   CPAP was reordered at 6 to improve ability to tolerate, however per pt was never offered.   Planned for 2nd part of staged PCI today   Patient denies chest pain, SOB, abd pain, N/V, fever, chills, dysuria or any other complaints. All remainder ROS negative.     MEDICATIONS  (STANDING):  aspirin enteric coated 81 milliGRAM(s) Oral daily  atorvastatin 20 milliGRAM(s) Oral at bedtime  clopidogrel Tablet 75 milliGRAM(s) Oral daily  losartan 25 milliGRAM(s) Oral daily    MEDICATIONS  (PRN):  atropine Injectable 0.5 milliGRAM(s) IV Push once PRN HR <35 sustained          PHYSICAL EXAM:  Vital Signs Last 24 Hrs  T(C): 36.7 (15 Aug 2022 12:56), Max: 36.8 (15 Aug 2022 05:16)  T(F): 98 (15 Aug 2022 12:56), Max: 98.3 (15 Aug 2022 05:16)  HR: 75 (15 Aug 2022 12:56) (58 - 75)  BP: 121/75 (15 Aug 2022 12:56) (110/64 - 134/83)  BP(mean): --  RR: 17 (15 Aug 2022 12:56) (17 - 18)  SpO2: 96% (15 Aug 2022 12:56) (96% - 98%)    Parameters below as of 15 Aug 2022 12:56  Patient On (Oxygen Delivery Method): room air        CONSTITUTIONAL: Non toxic appearing, lying in bed  ENMT: Moist oral mucosa, no pharyngeal injection or exudates; normal dentition  RESPIRATORY: Normal respiratory effort; lungs are clear to auscultation bilaterally  CARDIOVASCULAR: Regular rate and rhythm, normal S1 and S2, no murmur/rub/gallop; No lower extremity edema; Peripheral pulses are 2+ bilaterally  ABDOMEN: Nontender to palpation, normoactive bowel sounds, no rebound/guarding; No hepatosplenomegaly  MUSCLOSKELETAL:  Normal gait; no clubbing or cyanosis of digits; no joint swelling or tenderness to palpation  PSYCH: A+O to person, place, and time; affect appropriate  NEUROLOGY: CN 2-12 are intact and symmetric; no gross sensory deficits;   SKIN: No rashes; no palpable lesions      LABS:                                     12.3   4.91  )-----------( 213      ( 15 Aug 2022 02:16 )             35.8   08-15    140  |  103  |  17.3  ----------------------------<  102<H>  4.3   |  26.0  |  1.03    Ca    9.1      15 Aug 2022 02:16  Mg     2.0     08-15      PTT - ( 12 Aug 2022 12:29 )  PTT:25.6 sec  CARDIAC MARKERS ( 12 Aug 2022 12:29 )  x     / <0.01 ng/mL / x     / x     / x                RADIOLOGY & ADDITIONAL TESTS:    8/14/22 TTE   Summary:   1. Normal left ventricular internal cavity size.   2. Normal global left ventricular systolic function.   3. Basal inferior segment is abnormal as described above.   4. Left ventricular ejection fraction, by visual estimation, is 50 to 55%.   5. Spectral Doppler shows impaired relaxation pattern of left ventricular myocardial filling (Grade I diastolic dysfunction).   6. Mildly reduced RV systolic function.   7. The left atrium is normal in size.   8. The right atrium is normal in size.   9. Mild thickening of the anterior and posterior mitral valve leaflets.  10. Mild mitral valve regurgitation.  11. Mild tricuspid regurgitation.  12. Sclerotic aortic valve with decreased opening.  13. Mild aortic regurgitation.  14. There is no evidence of pericardial effusion.

## 2022-08-16 ENCOUNTER — TRANSCRIPTION ENCOUNTER (OUTPATIENT)
Age: 62
End: 2022-08-16

## 2022-08-16 VITALS
OXYGEN SATURATION: 96 % | SYSTOLIC BLOOD PRESSURE: 115 MMHG | RESPIRATION RATE: 18 BRPM | TEMPERATURE: 98 F | HEART RATE: 65 BPM | DIASTOLIC BLOOD PRESSURE: 80 MMHG

## 2022-08-16 LAB
ALBUMIN SERPL ELPH-MCNC: 4.4 G/DL — SIGNIFICANT CHANGE UP (ref 3.3–5.2)
ALP SERPL-CCNC: 96 U/L — SIGNIFICANT CHANGE UP (ref 40–120)
ALT FLD-CCNC: 28 U/L — SIGNIFICANT CHANGE UP
ANION GAP SERPL CALC-SCNC: 10 MMOL/L — SIGNIFICANT CHANGE UP (ref 5–17)
AST SERPL-CCNC: 26 U/L — SIGNIFICANT CHANGE UP
BASOPHILS # BLD AUTO: 0.05 K/UL — SIGNIFICANT CHANGE UP (ref 0–0.2)
BASOPHILS NFR BLD AUTO: 1 % — SIGNIFICANT CHANGE UP (ref 0–2)
BILIRUB SERPL-MCNC: 0.6 MG/DL — SIGNIFICANT CHANGE UP (ref 0.4–2)
BUN SERPL-MCNC: 19.1 MG/DL — SIGNIFICANT CHANGE UP (ref 8–20)
CALCIUM SERPL-MCNC: 9.4 MG/DL — SIGNIFICANT CHANGE UP (ref 8.4–10.5)
CHLORIDE SERPL-SCNC: 101 MMOL/L — SIGNIFICANT CHANGE UP (ref 98–107)
CO2 SERPL-SCNC: 27 MMOL/L — SIGNIFICANT CHANGE UP (ref 22–29)
CREAT SERPL-MCNC: 0.92 MG/DL — SIGNIFICANT CHANGE UP (ref 0.5–1.3)
EGFR: 94 ML/MIN/1.73M2 — SIGNIFICANT CHANGE UP
EOSINOPHIL # BLD AUTO: 0.17 K/UL — SIGNIFICANT CHANGE UP (ref 0–0.5)
EOSINOPHIL NFR BLD AUTO: 3.3 % — SIGNIFICANT CHANGE UP (ref 0–6)
GLUCOSE SERPL-MCNC: 89 MG/DL — SIGNIFICANT CHANGE UP (ref 70–99)
HCT VFR BLD CALC: 36 % — LOW (ref 39–50)
HGB BLD-MCNC: 12.6 G/DL — LOW (ref 13–17)
IMM GRANULOCYTES NFR BLD AUTO: 0.4 % — SIGNIFICANT CHANGE UP (ref 0–1.5)
LYMPHOCYTES # BLD AUTO: 0.98 K/UL — LOW (ref 1–3.3)
LYMPHOCYTES # BLD AUTO: 18.9 % — SIGNIFICANT CHANGE UP (ref 13–44)
MCHC RBC-ENTMCNC: 32.9 PG — SIGNIFICANT CHANGE UP (ref 27–34)
MCHC RBC-ENTMCNC: 35 GM/DL — SIGNIFICANT CHANGE UP (ref 32–36)
MCV RBC AUTO: 94 FL — SIGNIFICANT CHANGE UP (ref 80–100)
MONOCYTES # BLD AUTO: 0.72 K/UL — SIGNIFICANT CHANGE UP (ref 0–0.9)
MONOCYTES NFR BLD AUTO: 13.9 % — SIGNIFICANT CHANGE UP (ref 2–14)
NEUTROPHILS # BLD AUTO: 3.25 K/UL — SIGNIFICANT CHANGE UP (ref 1.8–7.4)
NEUTROPHILS NFR BLD AUTO: 62.5 % — SIGNIFICANT CHANGE UP (ref 43–77)
PLATELET # BLD AUTO: 225 K/UL — SIGNIFICANT CHANGE UP (ref 150–400)
POTASSIUM SERPL-MCNC: 4.9 MMOL/L — SIGNIFICANT CHANGE UP (ref 3.5–5.3)
POTASSIUM SERPL-SCNC: 4.9 MMOL/L — SIGNIFICANT CHANGE UP (ref 3.5–5.3)
PROT SERPL-MCNC: 6.6 G/DL — SIGNIFICANT CHANGE UP (ref 6.6–8.7)
RBC # BLD: 3.83 M/UL — LOW (ref 4.2–5.8)
RBC # FLD: 12.1 % — SIGNIFICANT CHANGE UP (ref 10.3–14.5)
SODIUM SERPL-SCNC: 138 MMOL/L — SIGNIFICANT CHANGE UP (ref 135–145)
WBC # BLD: 5.19 K/UL — SIGNIFICANT CHANGE UP (ref 3.8–10.5)
WBC # FLD AUTO: 5.19 K/UL — SIGNIFICANT CHANGE UP (ref 3.8–10.5)

## 2022-08-16 PROCEDURE — 99239 HOSP IP/OBS DSCHRG MGMT >30: CPT

## 2022-08-16 PROCEDURE — 93454 CORONARY ARTERY ANGIO S&I: CPT | Mod: 59

## 2022-08-16 PROCEDURE — 93005 ELECTROCARDIOGRAM TRACING: CPT

## 2022-08-16 PROCEDURE — 71045 X-RAY EXAM CHEST 1 VIEW: CPT

## 2022-08-16 PROCEDURE — C1887: CPT

## 2022-08-16 PROCEDURE — 80061 LIPID PANEL: CPT

## 2022-08-16 PROCEDURE — 83735 ASSAY OF MAGNESIUM: CPT

## 2022-08-16 PROCEDURE — 94660 CPAP INITIATION&MGMT: CPT

## 2022-08-16 PROCEDURE — 80307 DRUG TEST PRSMV CHEM ANLYZR: CPT

## 2022-08-16 PROCEDURE — 84100 ASSAY OF PHOSPHORUS: CPT

## 2022-08-16 PROCEDURE — 99232 SBSQ HOSP IP/OBS MODERATE 35: CPT

## 2022-08-16 PROCEDURE — U0005: CPT

## 2022-08-16 PROCEDURE — 86803 HEPATITIS C AB TEST: CPT

## 2022-08-16 PROCEDURE — C1769: CPT

## 2022-08-16 PROCEDURE — 80053 COMPREHEN METABOLIC PANEL: CPT

## 2022-08-16 PROCEDURE — C1725: CPT

## 2022-08-16 PROCEDURE — 83880 ASSAY OF NATRIURETIC PEPTIDE: CPT

## 2022-08-16 PROCEDURE — 85610 PROTHROMBIN TIME: CPT

## 2022-08-16 PROCEDURE — 85027 COMPLETE CBC AUTOMATED: CPT

## 2022-08-16 PROCEDURE — 93306 TTE W/DOPPLER COMPLETE: CPT

## 2022-08-16 PROCEDURE — C1874: CPT

## 2022-08-16 PROCEDURE — 99285 EMERGENCY DEPT VISIT HI MDM: CPT | Mod: 25

## 2022-08-16 PROCEDURE — 85730 THROMBOPLASTIN TIME PARTIAL: CPT

## 2022-08-16 PROCEDURE — C1894: CPT

## 2022-08-16 PROCEDURE — 83036 HEMOGLOBIN GLYCOSYLATED A1C: CPT

## 2022-08-16 PROCEDURE — 84484 ASSAY OF TROPONIN QUANT: CPT

## 2022-08-16 PROCEDURE — 80048 BASIC METABOLIC PNL TOTAL CA: CPT

## 2022-08-16 PROCEDURE — 36415 COLL VENOUS BLD VENIPUNCTURE: CPT

## 2022-08-16 PROCEDURE — U0003: CPT

## 2022-08-16 PROCEDURE — C9600: CPT | Mod: RC

## 2022-08-16 PROCEDURE — 85025 COMPLETE CBC W/AUTO DIFF WBC: CPT

## 2022-08-16 RX ORDER — LOSARTAN POTASSIUM 100 MG/1
1 TABLET, FILM COATED ORAL
Qty: 30 | Refills: 0
Start: 2022-08-16 | End: 2022-09-14

## 2022-08-16 RX ORDER — ATORVASTATIN CALCIUM 80 MG/1
1 TABLET, FILM COATED ORAL
Qty: 30 | Refills: 0
Start: 2022-08-16 | End: 2022-09-14

## 2022-08-16 RX ORDER — CLOPIDOGREL BISULFATE 75 MG/1
1 TABLET, FILM COATED ORAL
Qty: 0 | Refills: 0 | DISCHARGE

## 2022-08-16 RX ORDER — ASPIRIN/CALCIUM CARB/MAGNESIUM 324 MG
1 TABLET ORAL
Qty: 30 | Refills: 0
Start: 2022-08-16 | End: 2022-09-14

## 2022-08-16 RX ORDER — CLOPIDOGREL BISULFATE 75 MG/1
1 TABLET, FILM COATED ORAL
Qty: 30 | Refills: 0
Start: 2022-08-16 | End: 2022-09-14

## 2022-08-16 RX ADMIN — LOSARTAN POTASSIUM 25 MILLIGRAM(S): 100 TABLET, FILM COATED ORAL at 04:51

## 2022-08-16 RX ADMIN — Medication 81 MILLIGRAM(S): at 11:49

## 2022-08-16 RX ADMIN — CLOPIDOGREL BISULFATE 75 MILLIGRAM(S): 75 TABLET, FILM COATED ORAL at 11:49

## 2022-08-16 NOTE — PROGRESS NOTE ADULT - PROVIDER SPECIALTY LIST ADULT
Internal Medicine
Cardiology
Internal Medicine
Intervent Cardiology
Intervent Cardiology
Cardiology
Internal Medicine
Intervent Cardiology

## 2022-08-16 NOTE — DISCHARGE NOTE PROVIDER - NSDCCPCAREPLAN_GEN_ALL_CORE_FT
PRINCIPAL DISCHARGE DIAGNOSIS  Diagnosis: Coronary artery disease with angina pectoris  Assessment and Plan of Treatment: S/p PCI of RCA 8/12. Successful PCI to the OM and LCX 8/15 via RRA.   Pt was educated on compliance with dual antiplatelet therapy.   Also concern for bradycardia overnight due to sleep apnea, please follow up with Cardiology for pssibility of event recorder or ILR for ongoing monitoring.  Please take your medications as prescribed      SECONDARY DISCHARGE DIAGNOSES  Diagnosis: YELITZA (obstructive sleep apnea)  Assessment and Plan of Treatment: Please follow up with PCP and Pulmonology for CPAP

## 2022-08-16 NOTE — DISCHARGE NOTE PROVIDER - HOSPITAL COURSE
62 M  with CAD s/p PCI, active cocaine use admitted from cardiologists office for SOB and decreasing exercise tolerance with intermittent CP on exertion now s/p cath with severe 2 VD.  S/p PCI of RCA 8/12. Successful PCI to the OM and LCX 8/15 via RRA. Pt was educated on compliance with dual antiplatelet therapy. Pt also had concern for nocturnal bradycardia for which EP was consulted, likely AV block secondary to vagal tone and effects of sleep apnea overnight. EP discussed event recorder or possibly ILR for ongoing monitoring, but pt wanted to follow up with his own Cardiologist.   Pt medically stable for discharge today.     DC diagnosis:   Angina with CAD   HLD - Markedly elevated lipid panel. Would recommend repeat in 3-6 months and if still > 50% of current Lipoprotein A , may benefit from Repatha / Praluent (PCSK9 inh)  Cesario 1 with bradycardia   YELITZA   Polysubstance use

## 2022-08-16 NOTE — PROGRESS NOTE ADULT - SUBJECTIVE AND OBJECTIVE BOX
SHA GUTIERREZ  [id number]      Chief Complaint:  CP    Interval History:  s/p staged PCI     Tele:  Refused telemetry      aspirin enteric coated 81 milliGRAM(s) Oral daily  atorvastatin 40 milliGRAM(s) Oral at bedtime  atropine Injectable 0.5 milliGRAM(s) IV Push once PRN  clopidogrel Tablet 75 milliGRAM(s) Oral daily  losartan 25 milliGRAM(s) Oral daily          Physical Exam:  T(C): 36.7 (08-16-22 @ 11:47), Max: 36.8 (08-15-22 @ 18:32)  HR: 67 (08-16-22 @ 11:47) (54 - 86)  BP: 110/75 (08-16-22 @ 11:47) (95/64 - 124/84)  RR: 18 (08-16-22 @ 11:47) (17 - 18)  SpO2: 96% (08-16-22 @ 11:47) (96% - 99%)  Wt(kg): --  General: Comfortable in NAD  Neck: No JVD  CVS: nl s1s2, no s3  Pulm: CTA b/l  Abd: soft, non-tender  Ext: No c/c/e  Neuro A&O x3  Psych: Normal affect      Labs:   16 Aug 2022 05:02    138    |  101    |  19.1   ----------------------------<  89     4.9     |  27.0   |  0.92     Ca    9.4        16 Aug 2022 05:02  Mg     2.0       15 Aug 2022 02:16    TPro  6.6    /  Alb  4.4    /  TBili  0.6    /  DBili  x      /  AST  26     /  ALT  28     /  AlkPhos  96     16 Aug 2022 05:02                          12.6   5.19  )-----------( 225      ( 16 Aug 2022 05:02 )             36.0       CARDIAC MARKERS ( 15 Aug 2022 02:16 )  x     / <0.01 ng/mL / x     / x     / x          8/14/22 ECHO Summary:   1. Normal left ventricular internal cavity size.   2. Normal global left ventricular systolic function.   3. Basal inferior segment is abnormal as described above.   4.Left ventricular ejection fraction, by visual estimation, is 50 to   55%.   5. Spectral Doppler shows impaired relaxation pattern of left   ventricular myocardial filling (Grade I diastolic dysfunction).   6. Mildly reduced RV systolic function.   7.The left atrium is normal in size.   8. The right atrium is normal in size.   9. Mild thickening of the anterior and posterior mitral valve leaflets.  10. Mild mitral valve regurgitation.  11. Mild tricuspid regurgitation.  12. Sclerotic aortic valvewith decreased opening.  13. Mild aortic regurgitation.  14. There is no evidence of pericardial effusion.      Cardiac Cath 8/15/22: ISR prox RCA treated with OJ x 1, mLCx ISR 80% and dLCx 80% s/p OJ, LAD mild nonobstructive    Assessment:  62y Male with CAD s/p multiple PCI (last in 2017 per notes), HLD, knee replacement here with CP/SOB.   -BW unremarkable, first set trops negative  -Has been taking his ASA/plavix, but not on statin  -Symptoms concerning for angina, similar to prior dyspnea that was relieved with revascularization.   -Now s/p cath with severe 2 VD.  S/p PCI of RCA and LCX.   -Tele with episodes of 2:1 block while sleeping, likely increased vagal tone in the setting of YELITZA. He refused ILR  - Echo showing old inferior area of hypokinesis, but otherwise normal LVEF and is unchanged.    Plan:   1. S/P PCI of RCA, OM and LCX  2. Continue ASA/plavix, restart trial of statin  3. Refused ILR by EP  4. DC on current meds, stable from cardiology standpoint   5. Outpatient follow up in cardiology/pulmonary and PCP clinic

## 2022-08-16 NOTE — DISCHARGE NOTE PROVIDER - CARE PROVIDER_API CALL
Gildardo Otto (MD)  Cardiovascular Disease; Internal Medicine  [phone] [place] [address]  Phone: [phone]  Fax: [phone]  Follow Up Time:     PCP,   Phone: (   )    -  Fax: (   )    -  Follow Up Time:

## 2022-08-16 NOTE — DISCHARGE NOTE PROVIDER - NSDCMRMEDTOKEN_GEN_ALL_CORE_FT
Adult Aspirin Regimen 81 mg oral delayed release tablet: 1 tab(s) orally once a day  atorvastatin 40 mg oral tablet: 1 tab(s) orally once a day (at bedtime)  Biotene:   losartan 25 mg oral tablet: 1 tab(s) orally once a day  Plavix 75 mg oral tablet: 1 tab(s) orally once a day

## 2022-08-16 NOTE — PROGRESS NOTE ADULT - SUBJECTIVE AND OBJECTIVE BOX
62y Male with CAD s/p multiple PCI (last in 2017 per notes), HLD, knee replacement here with CP/SOB.   Symptoms were concerning for angina, similar to prior dyspnea that was relieved with revascularization.   -Now s/p cath with severe 2 VD.  S/p PCI of RCA 8/12. Successful PCI to the OM and LCX 8/15 via RRA.    Pt. seen post cath for site check. Repports no events overnight and eager to go home today.  RRA dressing removed and site warm, dry and intact. No swelling/redness and + pulse.    Reinforced dual antiplatelet therapy. Likely d/c home today as per primary team/Cardiologist.

## 2022-08-16 NOTE — DISCHARGE NOTE NURSING/CASE MANAGEMENT/SOCIAL WORK - PATIENT PORTAL LINK FT
You can access the FollowMyHealth Patient Portal offered by [facility] by registering at the following website: [email or web address]. By joining [facility]’s FollowMyHealth portal, you will also be able to view your health information using other applications (apps) compatible with our system.

## 2022-08-16 NOTE — DISCHARGE NOTE NURSING/CASE MANAGEMENT/SOCIAL WORK - NSDCPEFALRISK_GEN_ALL_CORE
For information on Fall & Injury Prevention, visit: [email or web address] OR  [email or web address] OR  https://www.cdc.gov/steadi/patient.html

## 2022-09-12 RX ORDER — ROSUVASTATIN CALCIUM 10 MG/1
10 TABLET, FILM COATED ORAL
Qty: 90 | Refills: 3 | Status: DISCONTINUED | COMMUNITY
Start: 2019-07-12 | End: 2022-09-12

## 2022-09-13 ENCOUNTER — RX RENEWAL (OUTPATIENT)
Age: 62
End: 2022-09-13

## 2022-09-22 ENCOUNTER — APPOINTMENT (OUTPATIENT)
Dept: CARDIOLOGY | Facility: CLINIC | Age: 62
End: 2022-09-22

## 2022-09-22 ENCOUNTER — NON-APPOINTMENT (OUTPATIENT)
Age: 62
End: 2022-09-22

## 2022-09-22 VITALS
BODY MASS INDEX: 30.81 KG/M2 | HEIGHT: 69 IN | HEART RATE: 66 BPM | WEIGHT: 208 LBS | RESPIRATION RATE: 16 BRPM | DIASTOLIC BLOOD PRESSURE: 74 MMHG | SYSTOLIC BLOOD PRESSURE: 110 MMHG

## 2022-09-22 DIAGNOSIS — R53.83 OTHER FATIGUE: ICD-10-CM

## 2022-09-22 PROCEDURE — 93000 ELECTROCARDIOGRAM COMPLETE: CPT

## 2022-09-22 PROCEDURE — 99214 OFFICE O/P EST MOD 30 MIN: CPT | Mod: 25

## 2022-09-22 RX ORDER — ISOSORBIDE MONONITRATE 30 MG/1
30 TABLET, EXTENDED RELEASE ORAL DAILY
Qty: 90 | Refills: 1 | Status: DISCONTINUED | COMMUNITY
Start: 2022-08-12 | End: 2022-09-22

## 2022-09-22 NOTE — PHYSICAL EXAM
[Well Developed] : well developed [Well Nourished] : well nourished [No Acute Distress] : no acute distress [Normal Conjunctiva] : normal conjunctiva [Normal Venous Pressure] : normal venous pressure [No Carotid Bruit] : no carotid bruit [Normal S1, S2] : normal S1, S2 [No Murmur] : no murmur [No Rub] : no rub [No Gallop] : no gallop [Clear Lung Fields] : clear lung fields [No Respiratory Distress] : no respiratory distress  [Soft] : abdomen soft [Normal Bowel Sounds] : normal bowel sounds [Normal Gait] : normal gait [No Edema] : no edema [No Rash] : no rash [No Skin Lesions] : no skin lesions [Moves all extremities] : moves all extremities [No Focal Deficits] : no focal deficits [Normal Speech] : normal speech [Alert and Oriented] : alert and oriented [Normal memory] : normal memory [de-identified] : Muscular/stocky

## 2022-09-22 NOTE — CARDIOLOGY SUMMARY
[de-identified] : Sinus rhythm at 66 bpm.  PVC.  Qs in leads II, III and aVF consistent with prior IWMI consistent with  of mid RCA.

## 2022-09-22 NOTE — HISTORY OF PRESENT ILLNESS
[FreeTextEntry1] : Mr. Charles presents today status-post left heart cardiac catheterization and OJ x 1 to RCA and OJ to OM2 and proximal CX.   He is presently feeling much better.  Denies complaints of exertional chest pain, palpitations, lightheadedness or syncope.  No further shortness of breath or fatigue.  During his hospital stay he was found to have asymptomatic nocturnal high degree AVB with intermittent 2:1 AVB.  Was seen by EP.  Patient refused any further testing while in the hospital.

## 2022-09-22 NOTE — DISCUSSION/SUMMARY
[EKG obtained to assist in diagnosis and management of assessed problem(s)] : EKG obtained to assist in diagnosis and management of assessed problem(s) [FreeTextEntry1] : 1 - Coronary artery disease with multi-stenting:  Patient is status-post left heart cardiac catheterization and OJ x 1 to RCA and OJ to OM2 and proximal CX.  He is presently feeling much better.  Denies complaints of exertional chest pain, palpitations, lightheadedness or syncope.  No further shortness of breath or fatigue.  Will continue aspirin 81mg, plavix 75mg, losartan 25mg and atorvastatin 40mg daily.\par \par 2 - Pauses and heart block:  During his hospital stay he was found to have asymptomatic nocturnal high degree AVB with intermittent 2:1 AVB.  Was seen by EP.  Patient refused any further testing while in the hospital.  Denies palpitations, lightheadedness or syncope.  Will have him undergo 30-day ambulatory event monitor to better assess his cardiac rhythm.\par \par 3 - Hypertension:  blood pressure well controlled on current medications.  Advised to follow strict low sodium diet.\par \par 4 - Hyperlipidemia:  lipid panel in hospital 8/16/2022 - cholesterol 332, triglycerides 83, HDL 48, .  Patient had not been taking statin prior to cath.  He is to continue with Atorvastatin 40mg daily.  Follow strict low fat, low cholesterol diet, increase physical activity and weight loss.\par \par 5 - Patient will need to undergo sleep study as this could very well be contributing to his nocturnal AVB and pauses.  States he would like to hold on this for now.  Will discuss again at his follow up visit.\par \par 6 - Cocaine use:  discussed cessation of drug use.  Avoid BBlocker use for now.\par \par 7 - Follow up in 6-8 weeks.

## 2022-10-31 NOTE — PROGRESS NOTE ADULT - REASON FOR ADMISSION
Right knee infection wound
normal (ped)...

## 2022-11-10 ENCOUNTER — APPOINTMENT (OUTPATIENT)
Dept: CARDIOLOGY | Facility: CLINIC | Age: 62
End: 2022-11-10

## 2022-12-08 ENCOUNTER — NON-APPOINTMENT (OUTPATIENT)
Age: 62
End: 2022-12-08

## 2023-01-02 NOTE — DISCHARGE NOTE PROVIDER - ATTENDING DISCHARGE PHYSICAL EXAMINATION:
Pt here due to fever for a few days.      Triage Assessment     Row Name 01/02/23 0926       Triage Assessment (Pediatric)    Airway WDL WDL       Respiratory WDL    Respiratory WDL WDL       Skin Circulation/Temperature WDL    Skin Circulation/Temperature WDL WDL       Cardiac WDL    Cardiac WDL WDL       Peripheral/Neurovascular WDL    Peripheral Neurovascular WDL WDL       Cognitive/Neuro/Behavioral WDL    Cognitive/Neuro/Behavioral WDL WDL               Vital Signs Last 24 Hrs  T(C): 36.6 (16 Aug 2022 07:43), Max: 36.8 (15 Aug 2022 18:32)  T(F): 97.8 (16 Aug 2022 07:43), Max: 98.2 (15 Aug 2022 18:32)  HR: 86 (16 Aug 2022 07:43) (54 - 86)  BP: 124/84 (16 Aug 2022 07:43) (95/64 - 124/84)  BP(mean): --  RR: 18 (16 Aug 2022 07:43) (17 - 18)  SpO2: 96% (16 Aug 2022 07:43) (96% - 99%)    Parameters below as of 16 Aug 2022 07:43  Patient On (Oxygen Delivery Method): room air    CONSTITUTIONAL: Non toxic appearing, lying in bed  ENMT: Moist oral mucosa, no pharyngeal injection or exudates; normal dentition  RESPIRATORY: Normal respiratory effort; lungs are clear to auscultation bilaterally  CARDIOVASCULAR: Regular rate and rhythm, normal S1 and S2, No lower extremity edema   ABDOMEN: Nontender to palpation, normoactive bowel sounds, no rebound/guarding; No hepatosplenomegaly  MUSCLOSKELETAL:  Normal gait; no clubbing or cyanosis of digits; no joint swelling or tenderness to palpation  PSYCH: A+O to person, place, and time; affect appropriate  NEUROLOGY: No gross sensory or motor deficits  SKIN: No rashes; no palpable lesions

## 2023-01-21 NOTE — H&P PST ADULT - HISTORY OF PRESENT ILLNESS
Patient/Caregiver provided printed discharge information. 60 yo M Hx of CAD s/p PCI stents x9 c/o intermittent pain in right knee for the past 4 years. Pt denies recent trauma or injury to the right knee 60 yo M Hx of CAD s/p PCI stents x9 c/o intermittent pain in right knee for the past 4 years and right popliteal baker's cyst. Pt denies recent trauma or injury to the right knee. Ptt denies  using medication for pain. patient has large bakers cyst to knee, had drained several times. Patient unable to work or walk due to pain. Patient states needs TKA. We had seen him in the past he has known bone-on-bone medial compartment osteoarthritis. He has had multiple injections with very little relief he has very severe varus deformity. 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 He recognizes and we discussed that that would be difficult for him following total knee arthroplasty..  He is very optimistic that he will be able to return to work about 4 weeks after surgery but he does understand this can be a significant setback for him especially since that the amount of deformity that he has in his right knee.     He states the symptoms are worsening. The patient mentions the symptoms started many year(s) ago. Pain levels include a current pain level of 6/10, an average pain level of 6/10, a minimum pain level of 4/10 and a maximum pain level of 8/10.   His symptoms occur while walking. He states the symptoms seem to be constant.     Modifying factors - worsened by bending, worsened by direct pressure, worsened by walking, worsened with knee flexion and worsened with knee extension. Relieving factors include not relieved by acetaminophen, not relieved by exercise regimen and not relieved by nonsteroidal anti-inflammatory drugs. No relieving factors are noted.   Associated Symptoms: no ataxia, no incontinence, good dexterity and no urinary retention. 58 yo M Hx of HLD, CAD s/p PCI stents x9, c/o intermittent pain in right knee for the past 4 years. Pain levels include a current pain level of 6/10, an average pain level of 6/10, a minimum pain level of 4/10 and a maximum pain level of 8/10. Pt denies trauma or injury to the right knee. Pt denies using medication for pain. Pain is worsened by bending, direct pressure, walking, knee flexion and extension. Pt has large bakers cyst to knee, drained several times. Pt unable to work or walk due to pain. Pt had multiple injections with very little relief and severe varus deformity. Presents for preop assessment prior to right TKR w/Dr Qureshi on 8/25 60 yo M Hx of HLD, CAD s/p PCI stents x9, c/o intermittent pain in right knee for the past 4 years. Pain levels include a current pain level of 6/10, an average pain level of 6/10, a minimum pain level of 4/10 and a maximum pain level of 8/10. Pt denies trauma or injury to the right knee. Pt denies using medication for pain. Pain is worsened by bending, direct pressure, walking, knee flexion and extension. Pt has large bakers cyst to knee, drained several times. Pt unable to work or walk due to pain. Pt had multiple injections with very little relief and severe varus deformity. Presents for preop assessment prior to right TKR w/Dr Qureshi on 8/25

## 2023-02-20 ENCOUNTER — NON-APPOINTMENT (OUTPATIENT)
Age: 63
End: 2023-02-20

## 2023-03-07 ENCOUNTER — NON-APPOINTMENT (OUTPATIENT)
Age: 63
End: 2023-03-07

## 2023-04-10 ENCOUNTER — NON-APPOINTMENT (OUTPATIENT)
Age: 63
End: 2023-04-10

## 2023-05-11 NOTE — H&P PST ADULT - MUSCULOSKELETAL COMMENTS
Head, normocephalic, atraumatic, Face, Face within normal limits, Ears, External ears within normal limits right knee right knee, right popliteal baker's cyst

## 2023-06-12 NOTE — ED STATDOCS - SPINE APPEARANCE, MLM
PAST MEDICAL HISTORY:  Anemia     Hematuria     Hypercholesteremia     Malignant neoplasm of urinary bladder, unspecified site since 2014. Last treatment 07/24/2017  No chemo or radiation    Obesity (BMI 30-39.9)      spine normal

## 2023-07-18 ENCOUNTER — RX RENEWAL (OUTPATIENT)
Age: 63
End: 2023-07-18

## 2023-09-26 NOTE — H&P ADULT - SOCIAL HISTORY: SUBSTANCE USE
Healthy diet and exercise  A1c today  See in 6 months diabetes check    Thank you for coming in today, if any questions or concerns arise, please call my office.   Marcial Hoffman, ANDREIA-CNP    
[place]

## 2023-10-09 ENCOUNTER — RX RENEWAL (OUTPATIENT)
Age: 63
End: 2023-10-09

## 2023-10-18 ENCOUNTER — APPOINTMENT (OUTPATIENT)
Dept: CARDIOLOGY | Facility: CLINIC | Age: 63
End: 2023-10-18

## 2023-10-26 ENCOUNTER — NON-APPOINTMENT (OUTPATIENT)
Age: 63
End: 2023-10-26

## 2023-10-26 ENCOUNTER — APPOINTMENT (OUTPATIENT)
Dept: CARDIOLOGY | Facility: CLINIC | Age: 63
End: 2023-10-26
Payer: MEDICARE

## 2023-10-26 VITALS
BODY MASS INDEX: 30.06 KG/M2 | HEART RATE: 70 BPM | DIASTOLIC BLOOD PRESSURE: 66 MMHG | SYSTOLIC BLOOD PRESSURE: 110 MMHG | WEIGHT: 210 LBS | HEIGHT: 70 IN | RESPIRATION RATE: 16 BRPM

## 2023-10-26 DIAGNOSIS — I25.10 ATHEROSCLEROTIC HEART DISEASE OF NATIVE CORONARY ARTERY W/OUT ANGINA PECTORIS: ICD-10-CM

## 2023-10-26 DIAGNOSIS — E78.5 HYPERLIPIDEMIA, UNSPECIFIED: ICD-10-CM

## 2023-10-26 DIAGNOSIS — I25.2 ATHEROSCLEROTIC HEART DISEASE OF NATIVE CORONARY ARTERY W/OUT ANGINA PECTORIS: ICD-10-CM

## 2023-10-26 DIAGNOSIS — I10 ESSENTIAL (PRIMARY) HYPERTENSION: ICD-10-CM

## 2023-10-26 DIAGNOSIS — R06.02 SHORTNESS OF BREATH: ICD-10-CM

## 2023-10-26 PROCEDURE — 93000 ELECTROCARDIOGRAM COMPLETE: CPT

## 2023-10-26 PROCEDURE — 99214 OFFICE O/P EST MOD 30 MIN: CPT | Mod: 25

## 2023-10-26 RX ORDER — LOSARTAN POTASSIUM 25 MG/1
25 TABLET, FILM COATED ORAL
Qty: 90 | Refills: 3 | Status: ACTIVE | COMMUNITY
Start: 1900-01-01 | End: 1900-01-01

## 2023-10-26 RX ORDER — ASPIRIN ENTERIC COATED TABLETS 81 MG 81 MG/1
81 TABLET, DELAYED RELEASE ORAL DAILY
Qty: 90 | Refills: 3 | Status: DISCONTINUED | COMMUNITY
End: 2023-10-26

## 2023-10-26 RX ORDER — ASPIRIN 81 MG/1
81 TABLET, COATED ORAL DAILY
Qty: 90 | Refills: 3 | Status: ACTIVE | COMMUNITY
Start: 2023-07-18 | End: 1900-01-01

## 2023-11-01 ENCOUNTER — NON-APPOINTMENT (OUTPATIENT)
Age: 63
End: 2023-11-01

## 2024-02-02 ENCOUNTER — APPOINTMENT (OUTPATIENT)
Dept: CARDIOLOGY | Facility: CLINIC | Age: 64
End: 2024-02-02

## 2024-05-22 NOTE — PHYSICAL THERAPY INITIAL EVALUATION ADULT - ASSISTIVE DEVICE FOR TRANSFER: STAND/SIT, REHAB EVAL
No heavy lifting or straining until patient is seen in the office  Ok to remove dressing in 48 hours and get wound wet in th shower. Do no scrub. Cover wound with dry dressing after shower. No baths, hot tubs, or pools.   Encourage ambulation at least 3 times per day.   No formal physical therapy until ordered by Dr. Hernández  Follow up in the office in two weeks. Appointment made prior to surgery  You must be accompanied by a responsible adult upon discharge and for 24 hours after surgery. Do no drive a motor vehicle, operate machinery, power tools or appliances, drink alcoholic beverages, or make critical decisions for 24 hours and while on narcotic pain medication.  Be aware of dizziness, which may cause a fall. Change positions slowly.   You may resume regular diet, but do so slowly.   Use your pain medication as prescribed by your physician/nurse practitioner/physician assistant. If possible, take your medication with food, and drink plenty of fluids.   Contact surgeon if you have questions, temperature of 101 degree or greater, increased bleeding, swelling, or pain, drainage from the incision or increased redness around the incision   Call [phone] with any questions or concerns    rolling walker

## 2024-06-25 RX ORDER — ATORVASTATIN CALCIUM 40 MG/1
40 TABLET, FILM COATED ORAL
Qty: 90 | Refills: 0 | Status: ACTIVE | COMMUNITY
Start: 1900-01-01 | End: 1900-01-01

## 2024-08-26 ENCOUNTER — APPOINTMENT (OUTPATIENT)
Dept: CARDIOLOGY | Facility: CLINIC | Age: 64
End: 2024-08-26

## 2024-10-15 ENCOUNTER — APPOINTMENT (OUTPATIENT)
Dept: CARDIOLOGY | Facility: CLINIC | Age: 64
End: 2024-10-15

## 2025-01-15 ENCOUNTER — RX RENEWAL (OUTPATIENT)
Age: 65
End: 2025-01-15

## 2025-01-17 ENCOUNTER — RX RENEWAL (OUTPATIENT)
Age: 65
End: 2025-01-17

## 2025-02-20 ENCOUNTER — APPOINTMENT (OUTPATIENT)
Dept: CARDIOLOGY | Facility: CLINIC | Age: 65
End: 2025-02-20

## 2025-05-07 ENCOUNTER — NON-APPOINTMENT (OUTPATIENT)
Age: 65
End: 2025-05-07

## 2025-05-16 ENCOUNTER — APPOINTMENT (OUTPATIENT)
Dept: CARDIOLOGY | Facility: CLINIC | Age: 65
End: 2025-05-16

## 2025-06-03 ENCOUNTER — APPOINTMENT (OUTPATIENT)
Dept: CARDIOLOGY | Facility: CLINIC | Age: 65
End: 2025-06-03
Payer: MEDICARE

## 2025-06-03 VITALS
SYSTOLIC BLOOD PRESSURE: 100 MMHG | DIASTOLIC BLOOD PRESSURE: 62 MMHG | HEIGHT: 70 IN | HEART RATE: 79 BPM | WEIGHT: 185 LBS | BODY MASS INDEX: 26.48 KG/M2 | RESPIRATION RATE: 16 BRPM

## 2025-06-03 DIAGNOSIS — I31.39 OTHER POSTPROCEDURAL COMPLICATIONS AND DISORDERS OF THE CIRCULATORY SYSTEM, NOT ELSEWHERE CLASSIFIED: ICD-10-CM

## 2025-06-03 DIAGNOSIS — E78.5 HYPERLIPIDEMIA, UNSPECIFIED: ICD-10-CM

## 2025-06-03 DIAGNOSIS — I97.89 OTHER POSTPROCEDURAL COMPLICATIONS AND DISORDERS OF THE CIRCULATORY SYSTEM, NOT ELSEWHERE CLASSIFIED: ICD-10-CM

## 2025-06-03 DIAGNOSIS — Z98.890 OTHER SPECIFIED POSTPROCEDURAL STATES: ICD-10-CM

## 2025-06-03 DIAGNOSIS — I25.2 ATHEROSCLEROTIC HEART DISEASE OF NATIVE CORONARY ARTERY W/OUT ANGINA PECTORIS: ICD-10-CM

## 2025-06-03 DIAGNOSIS — I25.10 ATHEROSCLEROTIC HEART DISEASE OF NATIVE CORONARY ARTERY W/OUT ANGINA PECTORIS: ICD-10-CM

## 2025-06-03 DIAGNOSIS — Z95.1 PRESENCE OF AORTOCORONARY BYPASS GRAFT: ICD-10-CM

## 2025-06-03 PROCEDURE — G2211 COMPLEX E/M VISIT ADD ON: CPT

## 2025-06-03 PROCEDURE — 93000 ELECTROCARDIOGRAM COMPLETE: CPT

## 2025-06-03 PROCEDURE — 99214 OFFICE O/P EST MOD 30 MIN: CPT

## 2025-06-03 RX ORDER — GABAPENTIN 100 MG/1
100 TABLET ORAL 3 TIMES DAILY
Qty: 90 | Refills: 0 | Status: ACTIVE | COMMUNITY
Start: 2025-06-03 | End: 1900-01-01

## 2025-06-03 RX ORDER — ATORVASTATIN CALCIUM 80 MG/1
80 TABLET, FILM COATED ORAL
Qty: 90 | Refills: 3 | Status: ACTIVE | COMMUNITY
Start: 2025-06-03 | End: 1900-01-01

## 2025-06-03 RX ORDER — FAMOTIDINE 20 MG/1
20 TABLET, FILM COATED ORAL DAILY
Qty: 30 | Refills: 0 | Status: ACTIVE | COMMUNITY
Start: 2025-06-03 | End: 1900-01-01

## 2025-06-03 RX ORDER — COLCHICINE 0.6 MG/1
0.6 TABLET ORAL
Qty: 60 | Refills: 0 | Status: ACTIVE | COMMUNITY
Start: 2025-06-03 | End: 1900-01-01

## 2025-06-03 RX ORDER — METOPROLOL SUCCINATE 25 MG/1
25 TABLET, EXTENDED RELEASE ORAL DAILY
Qty: 90 | Refills: 3 | Status: ACTIVE | COMMUNITY
Start: 2025-06-03 | End: 1900-01-01

## 2025-06-04 ENCOUNTER — APPOINTMENT (OUTPATIENT)
Dept: CARDIOLOGY | Facility: CLINIC | Age: 65
End: 2025-06-04

## 2025-06-04 PROCEDURE — 93306 TTE W/DOPPLER COMPLETE: CPT

## 2025-06-19 ENCOUNTER — APPOINTMENT (OUTPATIENT)
Dept: CARDIOLOGY | Facility: CLINIC | Age: 65
End: 2025-06-19

## 2025-06-19 VITALS
SYSTOLIC BLOOD PRESSURE: 110 MMHG | BODY MASS INDEX: 27.63 KG/M2 | HEIGHT: 70 IN | DIASTOLIC BLOOD PRESSURE: 70 MMHG | HEART RATE: 74 BPM | RESPIRATION RATE: 16 BRPM | WEIGHT: 193 LBS

## 2025-06-19 PROBLEM — R73.03 PREDIABETES: Status: ACTIVE | Noted: 2025-06-19

## 2025-06-19 PROCEDURE — 93000 ELECTROCARDIOGRAM COMPLETE: CPT

## 2025-06-19 PROCEDURE — G2211 COMPLEX E/M VISIT ADD ON: CPT

## 2025-06-19 PROCEDURE — 99214 OFFICE O/P EST MOD 30 MIN: CPT

## 2025-07-03 ENCOUNTER — NON-APPOINTMENT (OUTPATIENT)
Age: 65
End: 2025-07-03

## 2025-07-07 ENCOUNTER — APPOINTMENT (OUTPATIENT)
Dept: CARDIOLOGY | Facility: CLINIC | Age: 65
End: 2025-07-07
Payer: MEDICARE

## 2025-07-07 VITALS
BODY MASS INDEX: 27.35 KG/M2 | HEART RATE: 79 BPM | HEIGHT: 70 IN | DIASTOLIC BLOOD PRESSURE: 68 MMHG | WEIGHT: 191 LBS | SYSTOLIC BLOOD PRESSURE: 98 MMHG

## 2025-07-07 VITALS — SYSTOLIC BLOOD PRESSURE: 96 MMHG | DIASTOLIC BLOOD PRESSURE: 62 MMHG

## 2025-07-07 PROBLEM — S29.9XXA: Status: ACTIVE | Noted: 2025-07-07

## 2025-07-07 PROCEDURE — 93000 ELECTROCARDIOGRAM COMPLETE: CPT

## 2025-07-07 PROCEDURE — 99214 OFFICE O/P EST MOD 30 MIN: CPT

## 2025-07-07 PROCEDURE — G2211 COMPLEX E/M VISIT ADD ON: CPT

## 2025-07-10 ENCOUNTER — APPOINTMENT (OUTPATIENT)
Dept: CARDIOLOGY | Facility: CLINIC | Age: 65
End: 2025-07-10

## 2025-07-15 ENCOUNTER — APPOINTMENT (OUTPATIENT)
Dept: CARDIOLOGY | Facility: CLINIC | Age: 65
End: 2025-07-15

## 2025-07-25 ENCOUNTER — APPOINTMENT (OUTPATIENT)
Dept: CARDIOLOGY | Facility: CLINIC | Age: 65
End: 2025-07-25

## 2025-09-03 ENCOUNTER — APPOINTMENT (OUTPATIENT)
Dept: CARDIOLOGY | Facility: CLINIC | Age: 65
End: 2025-09-03

## 2025-09-18 ENCOUNTER — APPOINTMENT (OUTPATIENT)
Dept: CARDIOLOGY | Facility: CLINIC | Age: 65
End: 2025-09-18
Payer: MEDICARE

## 2025-09-18 VITALS
BODY MASS INDEX: 28.2 KG/M2 | HEIGHT: 70 IN | SYSTOLIC BLOOD PRESSURE: 110 MMHG | WEIGHT: 197 LBS | OXYGEN SATURATION: 97 % | HEART RATE: 61 BPM | DIASTOLIC BLOOD PRESSURE: 80 MMHG | RESPIRATION RATE: 16 BRPM

## 2025-09-18 DIAGNOSIS — I25.10 ATHEROSCLEROTIC HEART DISEASE OF NATIVE CORONARY ARTERY W/OUT ANGINA PECTORIS: ICD-10-CM

## 2025-09-18 DIAGNOSIS — C90.00 MULTIPLE MYELOMA NOT HAVING ACHIEVED REMISSION: ICD-10-CM

## 2025-09-18 DIAGNOSIS — E78.5 HYPERLIPIDEMIA, UNSPECIFIED: ICD-10-CM

## 2025-09-18 DIAGNOSIS — I25.2 ATHEROSCLEROTIC HEART DISEASE OF NATIVE CORONARY ARTERY W/OUT ANGINA PECTORIS: ICD-10-CM

## 2025-09-18 PROCEDURE — 93000 ELECTROCARDIOGRAM COMPLETE: CPT

## 2025-09-18 PROCEDURE — G2211 COMPLEX E/M VISIT ADD ON: CPT

## 2025-09-18 PROCEDURE — 99214 OFFICE O/P EST MOD 30 MIN: CPT
